# Patient Record
Sex: MALE | Race: WHITE | NOT HISPANIC OR LATINO | URBAN - METROPOLITAN AREA
[De-identification: names, ages, dates, MRNs, and addresses within clinical notes are randomized per-mention and may not be internally consistent; named-entity substitution may affect disease eponyms.]

---

## 2019-09-13 ENCOUNTER — OUTPATIENT (OUTPATIENT)
Dept: OUTPATIENT SERVICES | Age: 29
LOS: 1 days | Discharge: HOME | End: 2019-09-13

## 2019-09-16 ENCOUNTER — APPOINTMENT (OUTPATIENT)
Dept: PEDIATRIC CARDIOLOGY | Facility: CLINIC | Age: 29
End: 2019-09-16
Payer: MEDICARE

## 2019-09-16 VITALS
HEART RATE: 74 BPM | WEIGHT: 145.51 LBS | DIASTOLIC BLOOD PRESSURE: 70 MMHG | HEIGHT: 65.35 IN | SYSTOLIC BLOOD PRESSURE: 119 MMHG | OXYGEN SATURATION: 96 % | BODY MASS INDEX: 23.95 KG/M2 | RESPIRATION RATE: 16 BRPM

## 2019-09-16 DIAGNOSIS — Z00.00 ENCOUNTER FOR GENERAL ADULT MEDICAL EXAMINATION W/OUT ABNORMAL FINDINGS: ICD-10-CM

## 2019-09-16 PROCEDURE — 93325 DOPPLER ECHO COLOR FLOW MAPG: CPT

## 2019-09-16 PROCEDURE — 93000 ELECTROCARDIOGRAM COMPLETE: CPT

## 2019-09-16 PROCEDURE — 93303 ECHO TRANSTHORACIC: CPT

## 2019-09-16 PROCEDURE — 99205 OFFICE O/P NEW HI 60 MIN: CPT | Mod: 25

## 2019-09-16 PROCEDURE — 99354: CPT

## 2019-09-16 PROCEDURE — 93320 DOPPLER ECHO COMPLETE: CPT

## 2019-09-18 NOTE — REASON FOR VISIT
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

## 2019-09-18 NOTE — CONSULT LETTER
[Today's Date] : [unfilled] [Name] : Name: [unfilled] [] : : ~~ [Today's Date:] : [unfilled] [Dear  ___:] : Dear Dr. [unfilled]: [Consult] : I had the pleasure of evaluating your patient, [unfilled]. My full evaluation follows. [Consult - Single Provider] : Thank you very much for allowing me to participate in the care of this patient. If you have any questions, please do not hesitate to contact me. [Sincerely,] : Sincerely, [FreeTextEntry4] : Dr Aguilar [FreeTextEntry5] : [facility] [FreeTextEntry7] : [place]  [id number] [de-identified] : Janelle Martinez, MSN, CPNP-AC, PC\par Pediatric Cardiology, Adult Congenital Cardiology\par [facility]\par  [FreeTextEntry6] : [address]

## 2019-09-18 NOTE — REVIEW OF SYSTEMS
[Feeling Poorly] : not feeling poorly (malaise) [Fever] : no fever [Wgt Loss (___ Lbs)] : no recent weight loss [Pallor] : not pale [Eye Discharge] : no eye discharge [Redness] : no redness [Change in Vision] : no change in vision [Nasal Stuffiness] : no nasal congestion [Sore Throat] : no sore throat [Earache] : no earache [Loss Of Hearing] : no hearing loss [Cyanosis] : no cyanosis [Edema] : no edema [Diaphoresis] : not diaphoretic [Chest Pain] : no chest pain or discomfort [Exercise Intolerance] : no persistence of exercise intolerance [Palpitations] : no palpitations [Orthopnea] : no orthopnea [Fast HR] : no tachycardia [Tachypnea] : not tachypneic [Wheezing] : no wheezing [Cough] : no cough [Shortness Of Breath] : not expressed as feeling short of breath [Vomiting] : no vomiting [Diarrhea] : no diarrhea [Abdominal Pain] : no abdominal pain [Decrease In Appetite] : appetite not decreased [Seizure] : no seizures [Fainting (Syncope)] : no fainting [Headache] : no headache [Limping] : no limping [Dizziness] : no dizziness [Joint Pains] : no arthralgias [Joint Swelling] : no joint swelling [Rash] : no rash [Easy Bruising] : no tendency for easy bruising [Swollen Glands] : no lymphadenopathy [Wound problems] : no wound problems [Easy Bleeding] : no ~M tendency for easy bleeding [Nosebleeds] : no epistaxis [Hyperactive] : no hyperactive behavior [Sleep Disturbances] : ~T no sleep disturbances [Depression] : no depression [Anxiety] : no anxiety [Failure To Thrive] : no failure to thrive [Short Stature] : short stature was not noted [Jitteriness] : no jitteriness [Heat/Cold Intolerance] : no temperature intolerance [Dec Urine Output] : no oliguria [FreeTextEntry1] : This evaluation is confounded as the patient was not fully forthcoming in terms of any symptoms other than what resulted in his hospitalization.

## 2019-09-18 NOTE — HISTORY OF PRESENT ILLNESS
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 He has a history of non-adherence to medical advice and his only visit with us was his transition visit in May 2016. We do not have any of his records but per his mother’s recollection his surgical/cath procedures are outlined below: (dates are approximate)\par \par 1.	[place] Operation, 1990, Dr. Bennett – [facility]\par 2.	Bidirectional Justice Operation, 1991, Dr. Bennett – [facility]\par 3.	Extracardiac Fontan, 1997, Dr. Bennett – [facility]\par 4.	Cardiac Catheterization, 1999 – device closure of his fenestration\par \par Per review of his hospital records from [place] and discussion with him, he presented with a primary complaint of lower extremity edema; mainly legs and abdomen. His saturations were 99% on room air, /53 and HR 76 with no documentation of any rhythm issues while inpatient for 48 hours. While there he was treated with IV Lasix and improved dramatically. He was started on Digoxin, Lisinopril, Lasix and aspirin. Additionally he had a microcytic anemia and was started on oral iron.  He states he has been compliant with medication therapy; swelling has resolved and he feels better. He saw a hepatologist last week in [place] but we do not have that consult available. He is here today though angry that his mother made him come to the visit and initially refusing an echocardiogram.\par \par He does not work, mainly is at home or out with friends. He denies alcohol, smokes marijuana daily and smokes ½ pack of cigarettes daily.  On weekends, he says he "parties" including alcohol though he denies becoming inebriated. He does not go to the dentist or get an annual flu shot. He verbalizes that he does not trust doctors, that they never tell the truth.\par \par His cardiovascular review of systems is unremarkable, specifically he denies chest pain, palpitations, shortness of breath, dizziness, peripheral edema or syncope.\par \par Below is a review of his recent imaging/labs:\par \par Cardiac MRI (8/16/2019) (Interpreted by Dr. Bob)\par HLHS\par Aortic atresia\par Hypoplastic ascending aorta\par -Left aortic arch\par -Patent Justice shunt\par -Branch PA’s look patent, left smaller than right\par -patent Renea anastomosis\par -Fenestration device closure seen\par -extracardiac conduit appears patent\par -Pulmonary veins visualized\par \par CT Angio w/wo contrast (8/14/2019) (interpreted by Dr. Bob)\par -Coronary anatomy normal\par -Ao Root (@ sinus of Valsalva) 4.5 x 4.6 x 4.3\par -Ascending Ao 1.8 cm – narrowest point\par -Proximal Descending Ao 2.9 cm\par -LPA 11mm\par -RPA 17mm\par -Fontan Conduit 1.7 x 1.4 \par -no major collaterals\par -no coarctation\par -evidence of liver nodule/thrombus? In liver/near IVC (non obstructive)\par \par US – Lower Extremity Veins (8/14/2019)\par -patent femoral and popliteal veins\par -no DVT\par \par US- Aorta, IVC, bilateral iliac veins (8/14/2019)\par -no thrombus\par \par CXR (8/14/2019)\par -left aortic arch\par -septal occlusion device noted\par -no pneumothorax or pleural effusion\par -hyperinflated lungs\par \par Labs: (98/26/2019)\par \par            10\par 5.2 >-----< 225\par           33.2\par MCV 70\par MCH 21.2\par MCHC 30\par RDW 22\par \par Ferritin 21.3\par Iron 31 ()\par UIBC 528 (155-300)\par TIBC 559 (250-450)\par Transferrin Sat 6% (20-55%)   \par \par Free T4 1.4\par TSH 8.12\par Normal Urinalysis  \par

## 2019-09-18 NOTE — PHYSICAL EXAM
[General Appearance - Alert] : alert [General Appearance - Well Nourished] : well nourished [General Appearance - In No Acute Distress] : in no acute distress [General Appearance - Well-Appearing] : well appearing [General Appearance - Well Developed] : well developed [Appearance Of Head] : the head was normocephalic [Facies] : there were no dysmorphic facial features [Sclera] : the conjunctiva were normal [Outer Ear] : the ears and nose were normal in appearance [Examination Of The Oral Cavity] : mucous membranes were moist and pink [Auscultation Breath Sounds / Voice Sounds] : breath sounds clear to auscultation bilaterally [Respiration, Rhythm And Depth] : normal respiratory rhythm and effort [No Cough] : no cough [Stridor] : no stridor was observed [Chest Surgical / Traumatic Scar] : chest incision well healed [No Sternal Instability] : no sternal instability [No Tenderness] : there was no tenderness on palpation [Sternotomy] : sternotomy [Deformity] : a chest deformity was noted [Clean] : clean [Healing Well] : healing well [Dry] : dry [Palpable Crepitus] : no palpable crepitus [Well-Healed] : well-healed [Unremarkable] : unremarkable [Apical Impulse] : quiet precordium with normal apical impulse [No Murmur] : no murmurs  [Heart Rate And Rhythm] : normal heart rate and rhythm [Heart Sounds Gallop] : no gallops [Heart Sounds Pericardial Friction Rub] : no pericardial rub [Heart Sounds Click] : no clicks [Capillary Refill Test] : normal capillary refill [Normal] : abnormal  [Normal S1] : normal  [Bowel Sounds] : normal bowel sounds [Abdomen Soft] : soft [Abdomen Tenderness] : non-tender [Liver Tender To Palpation] : not tender [___cm] : with a span of [unfilled] cm [Palpable___cm below the RCM] : palpable [unfilled] cm below the right costal margin [Spleen Tender] : not tender [Musculoskeletal Exam: Normal Movement Of All Extremities] : normal movements of all extremities [Nail Clubbing] : no clubbing  or cyanosis of the fingernails [Musculoskeletal - Swelling] : no joint swelling seen [Musculoskeletal - Tenderness] : no joint tenderness was elicited [FreeTextEntry1] : There are extensive varicosites in the legs bilaterally [Motor Tone] : muscle strength and tone were normal [Cervical Lymph Nodes Enlarged Anterior] : The anterior cervical nodes were normal [Cervical Lymph Nodes Enlarged Posterior] : The posterior cervical nodes were normal [] : no rash [Skin Turgor] : normal turgor [Skin Lesions] : no lesions [Skin Color & Pigmentation] : normal skin color and pigmentation [Agitated] : agitated [Demonstrated Behavior - Infant Nonreactive To Parents] : interactive [Labile] : labile

## 2019-09-18 NOTE — DISCUSSION/SUMMARY
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 He now returns for his first evaluation in 3 years following hospitalization for lower extremity edema and clinical signs of Fontan failure.\par \par Prior to discussing Arie's clinical status we took a significant amount of time (40 minutes) addressing what Arie's expectations are of us, his healthcare team and his thoughts on his chronic care. He has a significant distrust of physicians, feeling that doctors "lie to him", give him the wrong medicine and just order tests to make money. He does appear to have a will to live in that he describes a future in which he sees himself enjoying with his friends. We were very kenneth about the fact that we do not know what the future holds but that there is a possibility that he may one day require a heart transplant. He is not willing to entertain that possibility at this point and time.\par \par We spent a great deal of time explaining his anatomy to him and the current issues that likely brought him to the emergency room.  In particular, we emphasized that his condition has obviously deteriorated somewhat since we last saw him in 2016.  Specifically, I walked him thought the current condition that brought him to the hospital - namely, congestive heart failure.  I probed his understanding of this and explained that this is not a particularly unusual occurrence for his condition.  We also discussed how we would approach the treatment of his heart failure going forward and the need for us to collect more data on him before we could answer questions about the exact treatments available to him.\par \par I was open with him that I understood that there is a significant level of mistrust between him and his physicians and asked him to allow us to iteratively look at his current condition and how he progresses (or not) with further treatment.  Given that he is aware of the potential for heart transplant, we did give him an understanding of how that process ensues following the transplant and potential episodes of rejection.  I won't bring up this possibility with him again unless he specifically asks me to discuss it as it is likely to result in more distrust of us.\par \par We will be speaking with him once we get some of the information from [place] and the labs we ordered today.  I offered to meet with him in our offices with or without his mom to discuss the results of our evaluation.\par  [Participate only in Mild PE activities] : [unfilled] may participate ONLY IN MILD physical education activities such as [place] games, golf, and badminton. [Needs SBE Prophylaxis] : [unfilled] does not need bacterial endocarditis prophylaxis

## 2020-01-01 ENCOUNTER — OUTPATIENT (OUTPATIENT)
Dept: OUTPATIENT SERVICES | Facility: HOSPITAL | Age: 30
LOS: 1 days | End: 2020-01-01

## 2020-01-13 ENCOUNTER — APPOINTMENT (OUTPATIENT)
Dept: PEDIATRIC CARDIOLOGY | Facility: CLINIC | Age: 30
End: 2020-01-13
Payer: MEDICARE

## 2020-01-13 ENCOUNTER — APPOINTMENT (OUTPATIENT)
Dept: PEDIATRIC CARDIOLOGY | Facility: CLINIC | Age: 30
End: 2020-01-13

## 2020-01-13 VITALS
BODY MASS INDEX: 26.13 KG/M2 | DIASTOLIC BLOOD PRESSURE: 75 MMHG | OXYGEN SATURATION: 93 % | HEIGHT: 65.35 IN | WEIGHT: 158.73 LBS | SYSTOLIC BLOOD PRESSURE: 118 MMHG | HEART RATE: 70 BPM | RESPIRATION RATE: 16 BRPM

## 2020-01-13 PROCEDURE — 93320 DOPPLER ECHO COMPLETE: CPT

## 2020-01-13 PROCEDURE — 93325 DOPPLER ECHO COLOR FLOW MAPG: CPT

## 2020-01-13 PROCEDURE — 93303 ECHO TRANSTHORACIC: CPT

## 2020-01-13 PROCEDURE — 93000 ELECTROCARDIOGRAM COMPLETE: CPT

## 2020-01-13 PROCEDURE — 99215 OFFICE O/P EST HI 40 MIN: CPT | Mod: 25

## 2020-01-13 RX ORDER — IRON/IRON ASP GLY/FA/MV-MIN 38 125-25-1MG
TABLET ORAL
Refills: 0 | Status: ACTIVE | COMMUNITY

## 2020-01-21 NOTE — REVIEW OF SYSTEMS
[Anxiety] : anxiety [Feeling Poorly] : not feeling poorly (malaise) [Fever] : no fever [Wgt Loss (___ Lbs)] : no recent weight loss [Pallor] : not pale [Loss Of Hearing] : no hearing loss [Cyanosis] : no cyanosis [Diaphoresis] : not diaphoretic [Edema] : no edema [Chest Pain] : no chest pain or discomfort [Exercise Intolerance] : no persistence of exercise intolerance [Palpitations] : no palpitations [Orthopnea] : no orthopnea [Fast HR] : no tachycardia [Tachypnea] : not tachypneic [Cough] : no cough [Shortness Of Breath] : not expressed as feeling short of breath [Diarrhea] : no diarrhea [Abdominal Pain] : no abdominal pain [Fainting (Syncope)] : no fainting [Headache] : no headache [Limping] : no limping [Dizziness] : no dizziness [Joint Pains] : no arthralgias [Joint Swelling] : no joint swelling [Rash] : no rash [Wound problems] : no wound problems [Easy Bruising] : no tendency for easy bruising [Easy Bleeding] : no ~M tendency for easy bleeding [Nosebleeds] : no epistaxis [Sleep Disturbances] : ~T no sleep disturbances [Depression] : no depression [Jitteriness] : no jitteriness [Heat/Cold Intolerance] : no temperature intolerance

## 2020-01-21 NOTE — CONSULT LETTER
[Today's Date] : [unfilled] [Name] : Name: [unfilled] [Today's Date:] : [unfilled] [] : : ~~ [Consult] : I had the pleasure of evaluating your patient, [unfilled]. My full evaluation follows. [Dear  ___:] : Dear Dr. [unfilled]: [Sincerely,] : Sincerely, [Consult - Multiple Provider] : Thank you very much for allowing us to participate in the care of this patient. If you have any questions, please do not hesitate to contact us. [FreeTextEntry6] : [address] [FreeTextEntry5] : [facility] [FreeTextEntry4] : Dr Aguilar [de-identified] : Janelle Martinez, MSN, CPNP-AC, PC\par Pediatric Cardiology, Adult Congenital Cardiology\par [facility]\par \par Demetrio Hernandez MD, JUNIE\par Medical Director, Adult Congenital Heart Program\par Professor of Pediatrics\par [employer] [facility] [employer] [facility]\par \par  [FreeTextEntry7] : [place]  [id number]

## 2020-01-21 NOTE — HISTORY OF PRESENT ILLNESS
[FreeTextEntry1] : We had the pleasure of seeing Arie for follow up today in the Adult Congenital Heart Program at [facility]. \par \par Arie is a 29 year old male with HLHS, previously followed by Dr. Huffman with a history of non adherence. He had one transition visit with us in 2016. We then saw him again in September 2019 following hospitalization at [place] for lower extremity edema and shortness of breath. He was treated with IV diuretics and started on Digoxin, Lasix, Lisinopril , Aspirin and iron for microcytic anemia. His symptoms have resolved and he has remained adherent with medication therapy since his hospitalization. He was feeling fatigued this past November and went to his PMD who did a full comprehensive lab panel on him which was completely normal. He had changed his iron supplements for a brief period of time and thinks that maybe that was the cause of his fatigue. He has refrained from alcohol but continues to smoke 1/2 pack of cigarettes and marijuana daily. He does exercise but states he would like to resume activity. He does not go to the dentist and does not get a flu shot. \par \par His cardiovascular review of systems is unremarkable. Specifically, he has no complaints of chest pain, palpitations, shortness of breath, peripheral edema, dizziness or syncope.\par \par We have attempted to obtain his operative reports and medical records and have been unsuccessful. Per his mother's recollection his surgical/cath history is summarized below:\par \par 1.	[place] Operation, 1990, Dr. Bennett – [facility]\par 2.	Bidirectional Justice Operation, 1991, Dr. Bennett – [facility]\par 3.	Extracardiac Fontan, 1997, Dr. Bennett – [facility]\par 4.	Cardiac Catheterization, 1999 – device closure of his fenestration

## 2020-01-21 NOTE — REASON FOR VISIT
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

## 2020-01-21 NOTE — PHYSICAL EXAM
[Apical Impulse] : quiet precordium with normal apical impulse [Heart Rate And Rhythm] : normal heart rate and rhythm [Normal S1] : normal  [S2 Single] : was single [Systolic] : systolic [IV] : a grade 4/6   [LUSB] : LUSB [Palpable___cm below the RCM] : palpable [unfilled] cm below the right costal margin [General Appearance - Alert] : alert [Facies] : the head and face were normal in appearance [Sclera] : the sclera were normal [Examination Of The Oral Cavity] : mucous membranes were moist and pink [Auscultation Breath Sounds / Voice Sounds] : breath sounds clear to auscultation bilaterally [Nail Clubbing] : no clubbing  or cyanosis of the fingers [Musculoskeletal Exam: Normal Movement Of All Extremities] : normal movements of all extremities [Musculoskeletal - Swelling] : no joint swelling or joint tenderness [Abnormal Walk] : normal gait [Skin Turgor] : normal turgor [Demonstrated Behavior - Infant Nonreactive To Parents] : interactive [Normal] : abnormal  [Liver Tender To Palpation] : not tender [FreeTextEntry1] : mild lower extremity varicosities

## 2020-01-21 NOTE — DISCUSSION/SUMMARY
[Needs SBE Prophylaxis] : [unfilled]  needs bacterial endocarditis prophylaxis. SBE prophylaxis is indicated for dental and invasive ENT procedures. (Circulation. 2007; 116: 1665-2392) [FreeTextEntry1] : In summary, Arie is a 29 year old male with HLHS who underwent staged palliation with an extracardiac Fontan and device closure of his fenestration a s a child. He is clinically and symptomatically significantly improved from our first visit with him following his hospitalization at [place] for lower extremity edema and shortness of breath. He has been compliant with his medications. His thyroid studies and his microcytic anemia have resolved.\par \par We discussed with him that we are pleased how he is doing currently. We reviewed the importance of meticulous dental hygiene and the need for regular dental cleanings even though he does not go to the dentist. We explained the importance of an annual flu shot. He has agreed to wear a Holter as part of routine screening for any arrhythmia and we will notify his mother with the results per his request. We have requested that he return for followup in 8 months.

## 2020-01-25 ENCOUNTER — EMERGENCY (EMERGENCY)
Facility: HOSPITAL | Age: 30
LOS: 1 days | Discharge: LEFT BEFORE DISCHARGE | End: 2020-01-25
Attending: EMERGENCY MEDICINE
Payer: MEDICARE

## 2020-01-25 VITALS
WEIGHT: 154.98 LBS | OXYGEN SATURATION: 95 % | RESPIRATION RATE: 18 BRPM | HEART RATE: 92 BPM | SYSTOLIC BLOOD PRESSURE: 112 MMHG | HEIGHT: 66 IN | TEMPERATURE: 98 F | DIASTOLIC BLOOD PRESSURE: 88 MMHG

## 2020-01-25 VITALS
RESPIRATION RATE: 18 BRPM | DIASTOLIC BLOOD PRESSURE: 66 MMHG | SYSTOLIC BLOOD PRESSURE: 99 MMHG | TEMPERATURE: 98 F | OXYGEN SATURATION: 96 % | HEART RATE: 87 BPM

## 2020-01-25 DIAGNOSIS — Z98.890 OTHER SPECIFIED POSTPROCEDURAL STATES: Chronic | ICD-10-CM

## 2020-01-25 LAB
ALBUMIN SERPL ELPH-MCNC: 2.9 G/DL — LOW (ref 3.3–5)
ALP SERPL-CCNC: 95 U/L — SIGNIFICANT CHANGE UP (ref 40–120)
ALT FLD-CCNC: 14 U/L — SIGNIFICANT CHANGE UP (ref 10–45)
ANION GAP SERPL CALC-SCNC: 15 MMOL/L — SIGNIFICANT CHANGE UP (ref 5–17)
APTT BLD: 29.2 SEC — SIGNIFICANT CHANGE UP (ref 27.5–36.3)
AST SERPL-CCNC: 15 U/L — SIGNIFICANT CHANGE UP (ref 10–40)
BASE EXCESS BLDV CALC-SCNC: 0.8 MMOL/L — SIGNIFICANT CHANGE UP (ref -2–2)
BASOPHILS # BLD AUTO: 0.09 K/UL — SIGNIFICANT CHANGE UP (ref 0–0.2)
BASOPHILS NFR BLD AUTO: 0.8 % — SIGNIFICANT CHANGE UP (ref 0–2)
BILIRUB SERPL-MCNC: 0.3 MG/DL — SIGNIFICANT CHANGE UP (ref 0.2–1.2)
BLD GP AB SCN SERPL QL: NEGATIVE — SIGNIFICANT CHANGE UP
BUN SERPL-MCNC: 17 MG/DL — SIGNIFICANT CHANGE UP (ref 7–23)
CA-I SERPL-SCNC: 1.11 MMOL/L — LOW (ref 1.12–1.3)
CALCIUM SERPL-MCNC: 7.9 MG/DL — LOW (ref 8.4–10.5)
CHLORIDE BLDV-SCNC: 100 MMOL/L — SIGNIFICANT CHANGE UP (ref 96–108)
CHLORIDE SERPL-SCNC: 98 MMOL/L — SIGNIFICANT CHANGE UP (ref 96–108)
CO2 BLDV-SCNC: 26 MMOL/L — SIGNIFICANT CHANGE UP (ref 22–30)
CO2 SERPL-SCNC: 21 MMOL/L — LOW (ref 22–31)
CREAT SERPL-MCNC: 0.97 MG/DL — SIGNIFICANT CHANGE UP (ref 0.5–1.3)
DIGOXIN SERPL-MCNC: <0.4 NG/ML — LOW (ref 0.8–2)
EOSINOPHIL # BLD AUTO: 0.07 K/UL — SIGNIFICANT CHANGE UP (ref 0–0.5)
EOSINOPHIL NFR BLD AUTO: 0.6 % — SIGNIFICANT CHANGE UP (ref 0–6)
GAS PNL BLDV: 130 MMOL/L — LOW (ref 135–145)
GAS PNL BLDV: SIGNIFICANT CHANGE UP
GAS PNL BLDV: SIGNIFICANT CHANGE UP
GLUCOSE BLDV-MCNC: 123 MG/DL — HIGH (ref 70–99)
GLUCOSE SERPL-MCNC: 133 MG/DL — HIGH (ref 70–99)
HCO3 BLDV-SCNC: 25 MMOL/L — SIGNIFICANT CHANGE UP (ref 21–29)
HCT VFR BLD CALC: 25.3 % — LOW (ref 39–50)
HCT VFR BLDA CALC: 23 % — LOW (ref 39–50)
HGB BLD CALC-MCNC: 7.5 G/DL — LOW (ref 13–17)
HGB BLD-MCNC: 7.2 G/DL — LOW (ref 13–17)
IMM GRANULOCYTES NFR BLD AUTO: 0.9 % — SIGNIFICANT CHANGE UP (ref 0–1.5)
INR BLD: 1.16 RATIO — SIGNIFICANT CHANGE UP (ref 0.88–1.16)
LACTATE BLDV-MCNC: 1.6 MMOL/L — SIGNIFICANT CHANGE UP (ref 0.7–2)
LYMPHOCYTES # BLD AUTO: 0.56 K/UL — LOW (ref 1–3.3)
LYMPHOCYTES # BLD AUTO: 5.1 % — LOW (ref 13–44)
MCHC RBC-ENTMCNC: 25 PG — LOW (ref 27–34)
MCHC RBC-ENTMCNC: 28.5 GM/DL — LOW (ref 32–36)
MCV RBC AUTO: 87.8 FL — SIGNIFICANT CHANGE UP (ref 80–100)
MONOCYTES # BLD AUTO: 0.85 K/UL — SIGNIFICANT CHANGE UP (ref 0–0.9)
MONOCYTES NFR BLD AUTO: 7.7 % — SIGNIFICANT CHANGE UP (ref 2–14)
NEUTROPHILS # BLD AUTO: 9.31 K/UL — HIGH (ref 1.8–7.4)
NEUTROPHILS NFR BLD AUTO: 84.9 % — HIGH (ref 43–77)
NRBC # BLD: 0 /100 WBCS — SIGNIFICANT CHANGE UP (ref 0–0)
OTHER CELLS CSF MANUAL: 2 ML/DL — LOW (ref 18–22)
PCO2 BLDV: 42 MMHG — SIGNIFICANT CHANGE UP (ref 35–50)
PH BLDV: 7.39 — SIGNIFICANT CHANGE UP (ref 7.35–7.45)
PLATELET # BLD AUTO: 263 K/UL — SIGNIFICANT CHANGE UP (ref 150–400)
PO2 BLDV: 20 MMHG — LOW (ref 25–45)
POTASSIUM BLDV-SCNC: 3.2 MMOL/L — LOW (ref 3.5–5.3)
POTASSIUM SERPL-MCNC: 3.7 MMOL/L — SIGNIFICANT CHANGE UP (ref 3.5–5.3)
POTASSIUM SERPL-SCNC: 3.7 MMOL/L — SIGNIFICANT CHANGE UP (ref 3.5–5.3)
PROT SERPL-MCNC: 5.1 G/DL — LOW (ref 6–8.3)
PROTHROM AB SERPL-ACNC: 13.3 SEC — HIGH (ref 10–12.9)
RBC # BLD: 2.88 M/UL — LOW (ref 4.2–5.8)
RBC # FLD: 15.4 % — HIGH (ref 10.3–14.5)
RH IG SCN BLD-IMP: POSITIVE — SIGNIFICANT CHANGE UP
SAO2 % BLDV: 19 % — LOW (ref 67–88)
SODIUM SERPL-SCNC: 134 MMOL/L — LOW (ref 135–145)
TROPONIN T, HIGH SENSITIVITY RESULT: 15 NG/L — SIGNIFICANT CHANGE UP (ref 0–51)
TROPONIN T, HIGH SENSITIVITY RESULT: 15 NG/L — SIGNIFICANT CHANGE UP (ref 0–51)
WBC # BLD: 10.98 K/UL — HIGH (ref 3.8–10.5)
WBC # FLD AUTO: 10.98 K/UL — HIGH (ref 3.8–10.5)

## 2020-01-25 PROCEDURE — 84132 ASSAY OF SERUM POTASSIUM: CPT

## 2020-01-25 PROCEDURE — 80053 COMPREHEN METABOLIC PANEL: CPT

## 2020-01-25 PROCEDURE — 85027 COMPLETE CBC AUTOMATED: CPT

## 2020-01-25 PROCEDURE — 99285 EMERGENCY DEPT VISIT HI MDM: CPT | Mod: GC

## 2020-01-25 PROCEDURE — 85730 THROMBOPLASTIN TIME PARTIAL: CPT

## 2020-01-25 PROCEDURE — 82330 ASSAY OF CALCIUM: CPT

## 2020-01-25 PROCEDURE — 80162 ASSAY OF DIGOXIN TOTAL: CPT

## 2020-01-25 PROCEDURE — 86850 RBC ANTIBODY SCREEN: CPT

## 2020-01-25 PROCEDURE — 71046 X-RAY EXAM CHEST 2 VIEWS: CPT

## 2020-01-25 PROCEDURE — 86901 BLOOD TYPING SEROLOGIC RH(D): CPT

## 2020-01-25 PROCEDURE — 85014 HEMATOCRIT: CPT

## 2020-01-25 PROCEDURE — 36430 TRANSFUSION BLD/BLD COMPNT: CPT

## 2020-01-25 PROCEDURE — 71046 X-RAY EXAM CHEST 2 VIEWS: CPT | Mod: 26

## 2020-01-25 PROCEDURE — 84484 ASSAY OF TROPONIN QUANT: CPT

## 2020-01-25 PROCEDURE — P9016: CPT

## 2020-01-25 PROCEDURE — 93005 ELECTROCARDIOGRAM TRACING: CPT

## 2020-01-25 PROCEDURE — 82947 ASSAY GLUCOSE BLOOD QUANT: CPT

## 2020-01-25 PROCEDURE — 86923 COMPATIBILITY TEST ELECTRIC: CPT

## 2020-01-25 PROCEDURE — 86900 BLOOD TYPING SEROLOGIC ABO: CPT

## 2020-01-25 PROCEDURE — 93010 ELECTROCARDIOGRAM REPORT: CPT

## 2020-01-25 PROCEDURE — 82435 ASSAY OF BLOOD CHLORIDE: CPT

## 2020-01-25 PROCEDURE — 83605 ASSAY OF LACTIC ACID: CPT

## 2020-01-25 PROCEDURE — 85610 PROTHROMBIN TIME: CPT

## 2020-01-25 PROCEDURE — 82803 BLOOD GASES ANY COMBINATION: CPT

## 2020-01-25 PROCEDURE — 84295 ASSAY OF SERUM SODIUM: CPT

## 2020-01-25 PROCEDURE — 99285 EMERGENCY DEPT VISIT HI MDM: CPT | Mod: 25

## 2020-01-25 NOTE — ED PROVIDER NOTE - PROGRESS NOTE DETAILS
Ervin, PGY3: Called patient's cardiologist, Dr. Demetrio Hernandez, and received voicemail. Left message requesting callback. Ervin, PGY3: Called patient's cardiology NP, Janelle Martinez, and received voicemail. Left message requesting callback. Alejandro RAWLS MD PGY2: Patient feels improved. We want admission for him due to anemia requiring transfusion on unknown etiology, subtheraputic dig levels, and initial presentation of dyspnea and hypotension, and patient appearing ghostly pale. All of this explained to parenst and patient, and they would likely to leave AMA as they feel he has felt this way in the past and would prefer to follow up with cardiologist on Monday. Risks of leaving, including death and decompensation, explained and understood. WIll return for any concerning devlopment.

## 2020-01-25 NOTE — CHART NOTE - NSCHARTNOTEFT_GEN_A_CORE
Patient wants to leave AMA. Reason for leaving is that patient states that he has felt this way int he past and would prefer to follow up with his cardiologist on Monday. The patient is clinically sober, free from distracting injury, appears to have intact insight and judgement and reason and in my opinion has the capacity to make decisions.     Signs and symptoms of symptomatic anemia; pale appearing and chronically ill appearing.   They have verbalized an understanding of my concerns.  Extent and limitation of exam include not having repeat H/H, monitoring, occult blood testing and cardiology evaluation.  Labs/Imaging/Concerns; labs show anemia 7/2/25.3, no baseline, symptomatic s/p 1U transfusion, imaging unremarkable; concern for unknown etiology for anemia and previous cardiac history.  Current treatment plan was to admit for monitoring H/H, discovering the etiology of his anemia and evaluation by his cardiologist in house.  Need for this was explained to patient in lay terms and he expressed his understanding.  Risks of forgoing treatment explained to patient in lay terms including worsening anemia, cardiac ischemia secondary to anemia, arrythmias, possible morbidity (paralysis/inability to walk etc) and death.   Alternatives to suggested treatment included monitoring in the ED, observation and repeat H/H here and further monitoring here.  Patient refused to wait in the ED, refused admission, refused repeat labs, refused occult blood.   Patient was given time for Questions and states he will follow up with his cardiologist on Monday. Attempted to reach patient's cardiologist and PMD via phone but went to voicemail.    Instructed patient to call 911 immediately if symptoms worsen or persist. Also instructed to return to ED as soon as possible. Patient has decisional capacity. Understands all of the above.     Patient requested to follow up with PMD as soon as possible.

## 2020-01-25 NOTE — ED ADULT NURSE NOTE - NSIMPLEMENTINTERV_GEN_ALL_ED
Implemented All Universal Safety Interventions:  [place] to call system. Call bell, personal items and telephone within reach. Instruct patient to call for assistance. Room bathroom lighting operational. Non-slip footwear when patient is off stretcher. Physically safe environment: no spills, clutter or unnecessary equipment. Stretcher in lowest position, wheels locked, appropriate side rails in place.

## 2020-01-25 NOTE — ED ADULT NURSE REASSESSMENT NOTE - NS ED NURSE REASSESS COMMENT FT1
Report received from Shola TURPIN.  Pt A&Ox3 calm and cooperative, Pt pale with cap refil at 4 sec, VSS.  Pt states he feels much better with the blood.  Denies chest pain, sob, ha, n/v/d, abdominal pain, f/c, urinary symptoms, hematuria.

## 2020-01-25 NOTE — ED ADULT NURSE NOTE - OBJECTIVE STATEMENT
The pt. is a 28 y/o M PMH of anemia, HLHS presenting to the ED with anemia, weakness and chills x 2 weeks. Upon assessment, pt. is AO x 4, clear breath sounds, normal heart sounds, present bowel sounds, abd soft and nontender, equal strength bilaterally. Pt. denies SOB, fever, n/v. Pt. resting comfortably, mother at the bedside, will continue to monitor. The pt. is a 30 y/o M PMH of anemia, HLHS presenting to the ED with anemia, weakness and chills x 2 weeks. Upon assessment, pt. is AO x 4, clear breath sounds, normal heart sounds, present bowel sounds, abd soft and nontender, equal strength bilaterally. Pt. denies SOB, fever, n/v. Pt. resting comfortably, mother at the bedside, will continue to monitor. Pt is profoundly pale. Denies visible blood loss (no epistaxis stool/hematuria)

## 2020-01-25 NOTE — ED ADULT NURSE REASSESSMENT NOTE - NS ED NURSE REASSESS COMMENT FT1
Pt states he wants to leave AMA, Pt signed AMA forms, states he feels much better and will follow up with cardiology on Monday

## 2020-01-25 NOTE — ED PROVIDER NOTE - PATIENT PORTAL LINK FT
You can access the FollowMyHealth Patient Portal offered by [facility] by registering at the following website: [email or web address]. By joining [facility]’s FollowMyHealth portal, you will also be able to view your health information using other applications (apps) compatible with our system.

## 2020-01-25 NOTE — ED PROVIDER NOTE - INTERNATIONAL TRAVEL
HISTORY OF PRESENT ILLNESS  Jayna Nash is a 68 y.o. female. HPI  Follow up on chronic medical problems. Overall feeling very good. Cardiovascular Review:  The patient has hypertension and hyperlipidemia. Last lipid profile in 11/16 was WNL. Tolerating dyazide and Norvasc very well. No chest pain and no SOB. Diet and Lifestyle: generally follows a low fat low cholesterol diet, generally follows a low sodium diet, exercises regularly  Home BP Monitoring: Ranging 120s/80s. Pertinent ROS: taking medications as instructed, no TIA's, no chest pain on exertion, no dyspnea on exertion, no swelling of ankles. Has had some irregular heart beat over the last several weeks. Comes and goes. Glucose intolerance reveiw:  She has IGT. Last a1c level was 5.9% in 12/16. Diabetic ROS - further diabetic ROS: no polyuria or polydipsia, no chest pain, dyspnea or TIA's, no numbness, tingling or pain in extremities, no unusual visual symptoms. Lab review: orders written for new lab studies as appropriate; see orders. Patient Active Problem List   Diagnosis Code    Hypovitaminosis D E55.9    Hyperlipidemia E78.5    Asymptomatic PVCs/APC's I49.3    IGT (impaired glucose tolerance) R73.02    Essential hypertension I10    Encounter for medication monitoring Z51.81       Current Outpatient Prescriptions   Medication Sig Dispense Refill    chlorpheniramine-dm (CORICIDIN HBP COUGH AND COLD) 4-30 mg tab Take  by mouth.  [START ON 2/9/2018] potassium chloride SR (KLOR-CON 10) 10 mEq tablet Take 1 Tab by mouth two (2) times a week. 45 Tab 1    triamterene-hydroCHLOROthiazide (DYAZIDE) 37.5-25 mg per capsule Take 1 Cap by mouth daily. 90 Cap 3    amLODIPine (NORVASC) 5 mg tablet TAKE 1 TABLET BY MOUTH DAILY 90 Tab 3    cholecalciferol, vitamin D3, (VITAMIN D3) 2,000 unit tab Take 2,000 Units by mouth daily.  ferrous sulfate 325 mg (65 mg iron) tablet Take 325 mg by mouth as needed.       aspirin 81 mg tablet Take 1 Tab by mouth daily.  fluticasone (FLONASE) 50 mcg/actuation nasal spray 2 Sprays by Both Nostrils route as needed for Rhinitis. Allergies   Allergen Reactions    Triamterene Swelling and Other (comments)     Diazide is ok. ...just it's generic.     Prinzide [Lisinopril-Hydrochlorothiazide] Shortness of Breath     fatigue       Past Medical History:   Diagnosis Date    Hypertension        Past Surgical History:   Procedure Laterality Date    HX DILATION AND CURETTAGE      OH COLONOSCOPY FLX DX W/COLLJ SPEC WHEN PFRMD      \"long time ago\"       Family History   Problem Relation Age of Onset    Anemia Mother     Heart Failure Mother     Heart Failure Father     Dementia Father      alzheimers    No Known Problems Sister     No Known Problems Brother     No Known Problems Brother     No Known Problems Brother     Heart Attack Sister     No Known Problems Brother     No Known Problems Sister        Social History   Substance Use Topics    Smoking status: Never Smoker    Smokeless tobacco: Never Used    Alcohol use 0.0 oz/week     0 Standard drinks or equivalent per week      Comment: rarely     Lab Results  Component Value Date/Time   WBC 6.2 08/04/2017 11:44 AM   HGB 13.3 08/04/2017 11:44 AM   Hemoglobin (POC) 15.3 12/26/2014 05:11 PM   HCT 41.2 08/04/2017 11:44 AM   Hematocrit (POC) 45 12/26/2014 05:11 PM   PLATELET 183 83/94/5933 11:44 AM   MCV 75 (L) 08/04/2017 11:44 AM     Lab Results  Component Value Date/Time   Cholesterol, total 188 08/04/2017 11:44 AM   HDL Cholesterol 56 08/04/2017 11:44 AM   LDL, calculated 112 (H) 08/04/2017 11:44 AM   Triglyceride 101 08/04/2017 11:44 AM     Lab Results  Component Value Date/Time   TSH 0.876 10/15/2014 09:30 AM   T4, Free 1.14 10/15/2014 09:30 AM      Lab Results   Component Value Date/Time    Sodium 141 08/04/2017 11:44 AM    Potassium 3.7 08/04/2017 11:44 AM    Chloride 94 (L) 08/04/2017 11:44 AM    CO2 29 08/04/2017 11:44 AM Anion gap 4 (L) 12/26/2014 05:04 PM    Glucose 91 08/04/2017 11:44 AM    BUN 14 08/04/2017 11:44 AM    Creatinine 0.95 08/04/2017 11:44 AM    BUN/Creatinine ratio 15 08/04/2017 11:44 AM    GFR est AA 69 08/04/2017 11:44 AM    GFR est non-AA 60 08/04/2017 11:44 AM    Calcium 9.9 08/04/2017 11:44 AM    Bilirubin, total 0.3 08/04/2017 11:44 AM    ALT (SGPT) 15 08/04/2017 11:44 AM    AST (SGOT) 20 08/04/2017 11:44 AM    Alk. phosphatase 62 08/04/2017 11:44 AM    Protein, total 7.6 08/04/2017 11:44 AM    Albumin 4.6 08/04/2017 11:44 AM    A-G Ratio 1.5 08/04/2017 11:44 AM      Lab Results   Component Value Date/Time    Hemoglobin A1c 6.2 (H) 06/26/2013 09:48 AM    Hemoglobin A1c (POC) 6.1 02/06/2018 11:49 AM         Review of Systems   Constitutional: Negative for malaise/fatigue. HENT: Negative for congestion. Eyes: Negative for blurred vision. Respiratory: Negative for cough and shortness of breath. Cardiovascular: Negative for chest pain, palpitations and leg swelling. Gastrointestinal: Negative for abdominal pain, constipation and heartburn. Genitourinary: Negative for dysuria, frequency and urgency. Musculoskeletal: Negative for back pain and joint pain. Neurological: Negative for dizziness, tingling and headaches. Endo/Heme/Allergies: Negative for environmental allergies. Psychiatric/Behavioral: Negative for depression. The patient does not have insomnia. Physical Exam   Constitutional: She appears well-developed and well-nourished. /73 (BP 1 Location: Left arm, BP Patient Position: Sitting)  Pulse 75  Temp 98.1 °F (36.7 °C) (Oral)   Resp 16  Ht 5' 4.25\" (1.632 m)  Wt 212 lb 6.4 oz (96.3 kg)  LMP 08/15/1994  SpO2 98%  BMI 36.18 kg/m2       HENT:   Right Ear: Tympanic membrane and ear canal normal.   Left Ear: Tympanic membrane and ear canal normal.   Nose: No mucosal edema or rhinorrhea.    Mouth/Throat: Oropharynx is clear and moist and mucous membranes are normal. Neck: Normal range of motion. Neck supple. No thyromegaly present. Cardiovascular: Normal rate and regular rhythm. No murmur heard. Pulmonary/Chest: Effort normal and breath sounds normal.   Abdominal: Soft. Bowel sounds are normal. There is no tenderness. Musculoskeletal: Normal range of motion. She exhibits no edema. Lymphadenopathy:     She has no cervical adenopathy. Skin: Skin is warm and dry. Psychiatric: She has a normal mood and affect. Nursing note and vitals reviewed. ASSESSMENT and PLAN  Diagnoses and all orders for this visit:    1. Essential hypertension  Stable     2. Hyperlipidemia, unspecified hyperlipidemia type  -     LIPID PANEL    3. IGT (impaired glucose tolerance)  -     AMB POC HEMOGLOBIN A1C    4. Hypokalemia  -     Refill potassium chloride SR (KLOR-CON 10) 10 mEq tablet; Take 1 Tab by mouth two (2) times a week. 5. Hypovitaminosis D  -     VITAMIN D, 25 HYDROXY    6. Encounter for medication monitoring  -     METABOLIC PANEL, BASIC    7. Encounter for screening mammogram for breast cancer  -     TABBY MAMMO BI SCREENING INCL CAD; Future    8. Screening for osteoporosis  -     DEXA BONE DENSITY STUDY AXIAL; Future    9. Colon cancer screening  -     OCCULT BLOOD, IMMUNOASSAY (FIT)    10. Non Morbid Obesity  Discussed weight loss options, diet and exercise. Also reviewed health issues related to obesity. Initial goal of weight loss 10% of current weight. Counseled on goal for exercise of eventual goal of 30-60 minutes 5-7 times a week as per AHA guidelines. Decrease carbohydrates (white foods, sweet foods, sweet drinks and alcohol), increase green leafy vegetables and protein (lean meats and beans). Avoid fried foods. Increase water intake. Eat 3-5 small meals daily. Increase physical activity.     Follow-up Disposition:  Return in about 6 months (around 8/6/2018) for physical.  reviewed diet, exercise and weight control  cardiovascular risk and specific lipid/LDL goals reviewed  reviewed medications and side effects in detail  specific diabetic recommendations: low cholesterol diet, weight control and daily exercise discussed and glycohemoglobin and other lab monitoring discussed     I have discussed diagnosis listed in this note with pt and/or family. I have discussed treatment plans and options and the risk/benefit analysis of those options, including safe use of medications and possible medication side effects. Through the use of shared decision making we have agreed to the above plan. The patient has received an after-visit summary and questions were answered concerning future plans and follow up. Advise pt of any urgent changes then to proceed to the ER. No

## 2020-01-25 NOTE — ED PROVIDER NOTE - CLINICAL SUMMARY MEDICAL DECISION MAKING FREE TEXT BOX
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 History of anemia of unclear origin. Will obtain labs, including cbc, type and screen.. Given unclear origin of anemia, will obtain fecal occult blood. Given digoxin use, will obtain level, as well as EKG. Will obtain cxr, blood gas, cmp to rule out other causes for weakness. Will provide blood if anemic. Reassess.

## 2020-01-25 NOTE — ED PROVIDER NOTE - CONSTITUTIONAL, MLM
normal... Pale appearing, awake, alert, oriented to person, place, time/situation and in no apparent distress.

## 2020-01-25 NOTE — ED PROVIDER NOTE - OBJECTIVE STATEMENT
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 States feels similar to when he was anemic in the past. Denies sob at rest, leg swelling, chest pain, palpitations, fever, cough, or any other complaints.     PMD: Dr. Aguilar  Card: Dr. Hernandez and Dr. Martinez

## 2020-01-25 NOTE — ED PROVIDER NOTE - ATTENDING CONTRIBUTION TO CARE
29M w/ PMHx as above, presenting with worsening paleness, fatigue, coldness, dizziness, shortness of breath with exertion and decreased appetite x 4-5 days. Patient states that he was anemic in the past. Does not know why he was anemic.    PHYSICAL EXAMINATION:  VITALS REVIEWED.  VS borderline hypotensive 99/66, otherwise normal  GENERALIZED APPEARANCE: Chronically ill appearing male, pale  SKIN:  Warm, dry, no cyanosis  HEAD:  No obvious scalp lesions  EYES:  Conjunctiva pale, no icterus  ENMT:  Mucus membranes moist, no stridor  NECK:  Supple, non-tender  CHEST AND RESPIRATORY:  Clear to auscultation B/L, good air entry B/L, equal chest expansion  HEART AND CARDIOVASCULAR:  Regular rate, +murmur  ABDOMEN AND GI:  Soft, non-tender, non-distended.  No rebound, no guarding  EXTREMITIES:  No deformity, edema, or calf tenderness  NEURO: AAOx3, gross motor and sensory intact, MAEx4, strength 5/5 B/L UE/LE, reflexes 2+ B/L UE/LE  PSYCH: Normal affect     Impression:  Anemia with extensive cardiac history; r/o ischemia, labs, imaging, transfuse, admit for further work up and cardiology evaluation.

## 2020-01-25 NOTE — ED PROVIDER NOTE - NSFOLLOWUPINSTRUCTIONS_ED_ALL_ED_FT
Please return to the ED for any concerns whatsoever. Please follow-up and call your cardiologist ASAP.

## 2020-01-25 NOTE — ED ADULT NURSE REASSESSMENT NOTE - NS ED NURSE REASSESS COMMENT FT1
Hemoglobin 7.2, type and screen resulted O+. Blood transfusion consent in paper chart. 1 Unite PRBCs instantiated at 1720 to infuse over 3 hours. Pt. tolerating well. Pt. hypotensive. MD Mueller aware. Hemoglobin 7.2, type and screen resulted O+. Blood transfusion consent in paper chart. 1 Unit PRBCs initiated at 1720 to infuse over 3 hours. Pt. tolerating well. Pt. hypotensive; still awake and alert. MD Mueller aware.

## 2020-01-28 PROBLEM — Q23.4 HYPOPLASTIC LEFT HEART SYNDROME: Chronic | Status: ACTIVE | Noted: 2020-01-25

## 2020-01-31 ENCOUNTER — APPOINTMENT (OUTPATIENT)
Dept: GASTROENTEROLOGY | Facility: CLINIC | Age: 30
End: 2020-01-31

## 2020-01-31 ENCOUNTER — TRANSCRIPTION ENCOUNTER (OUTPATIENT)
Age: 30
End: 2020-01-31

## 2020-01-31 ENCOUNTER — INPATIENT (INPATIENT)
Facility: HOSPITAL | Age: 30
LOS: 0 days | Discharge: LEFT BEFORE DISCHARGE | End: 2020-02-01
Attending: HOSPITALIST | Admitting: HOSPITALIST
Payer: MEDICARE

## 2020-01-31 VITALS
OXYGEN SATURATION: 100 % | SYSTOLIC BLOOD PRESSURE: 97 MMHG | HEIGHT: 66 IN | RESPIRATION RATE: 24 BRPM | HEART RATE: 112 BPM | TEMPERATURE: 98 F | DIASTOLIC BLOOD PRESSURE: 47 MMHG

## 2020-01-31 DIAGNOSIS — R19.5 OTHER FECAL ABNORMALITIES: ICD-10-CM

## 2020-01-31 DIAGNOSIS — E83.51 HYPOCALCEMIA: ICD-10-CM

## 2020-01-31 DIAGNOSIS — E87.1 HYPO-OSMOLALITY AND HYPONATREMIA: ICD-10-CM

## 2020-01-31 DIAGNOSIS — K76.9 LIVER DISEASE, UNSPECIFIED: ICD-10-CM

## 2020-01-31 DIAGNOSIS — R65.10 SYSTEMIC INFLAMMATORY RESPONSE SYNDROME (SIRS) OF NON-INFECTIOUS ORIGIN WITHOUT ACUTE ORGAN DYSFUNCTION: ICD-10-CM

## 2020-01-31 DIAGNOSIS — D64.9 ANEMIA, UNSPECIFIED: ICD-10-CM

## 2020-01-31 DIAGNOSIS — Z98.890 OTHER SPECIFIED POSTPROCEDURAL STATES: Chronic | ICD-10-CM

## 2020-01-31 DIAGNOSIS — Z02.9 ENCOUNTER FOR ADMINISTRATIVE EXAMINATIONS, UNSPECIFIED: ICD-10-CM

## 2020-01-31 DIAGNOSIS — Z29.9 ENCOUNTER FOR PROPHYLACTIC MEASURES, UNSPECIFIED: ICD-10-CM

## 2020-01-31 DIAGNOSIS — Q23.4 HYPOPLASTIC LEFT HEART SYNDROME: ICD-10-CM

## 2020-01-31 DIAGNOSIS — R06.02 SHORTNESS OF BREATH: ICD-10-CM

## 2020-01-31 LAB
ALBUMIN SERPL ELPH-MCNC: 2.5 G/DL — LOW (ref 3.3–5)
ALP SERPL-CCNC: 106 U/L — SIGNIFICANT CHANGE UP (ref 40–120)
ALT FLD-CCNC: 20 U/L — SIGNIFICANT CHANGE UP (ref 4–41)
ANION GAP SERPL CALC-SCNC: 12 MMO/L — SIGNIFICANT CHANGE UP (ref 7–14)
APPEARANCE UR: CLEAR — SIGNIFICANT CHANGE UP
APTT BLD: 26.4 SEC — LOW (ref 27.5–36.3)
AST SERPL-CCNC: 43 U/L — HIGH (ref 4–40)
B PERT DNA SPEC QL NAA+PROBE: NOT DETECTED — SIGNIFICANT CHANGE UP
BASOPHILS # BLD AUTO: 0.03 K/UL — SIGNIFICANT CHANGE UP (ref 0–0.2)
BASOPHILS NFR BLD AUTO: 0.5 % — SIGNIFICANT CHANGE UP (ref 0–2)
BASOPHILS NFR SPEC: 0 % — SIGNIFICANT CHANGE UP (ref 0–2)
BILIRUB SERPL-MCNC: 0.4 MG/DL — SIGNIFICANT CHANGE UP (ref 0.2–1.2)
BILIRUB UR-MCNC: NEGATIVE — SIGNIFICANT CHANGE UP
BLD GP AB SCN SERPL QL: NEGATIVE — SIGNIFICANT CHANGE UP
BLOOD UR QL VISUAL: NEGATIVE — SIGNIFICANT CHANGE UP
BUN SERPL-MCNC: 22 MG/DL — SIGNIFICANT CHANGE UP (ref 7–23)
C PNEUM DNA SPEC QL NAA+PROBE: NOT DETECTED — SIGNIFICANT CHANGE UP
CALCIUM SERPL-MCNC: 7.7 MG/DL — LOW (ref 8.4–10.5)
CHLORIDE SERPL-SCNC: 98 MMOL/L — SIGNIFICANT CHANGE UP (ref 98–107)
CHOLEST SERPL-MCNC: 94 MG/DL — LOW (ref 120–199)
CO2 SERPL-SCNC: 20 MMOL/L — LOW (ref 22–31)
COLOR SPEC: YELLOW — SIGNIFICANT CHANGE UP
CREAT SERPL-MCNC: 1.05 MG/DL — SIGNIFICANT CHANGE UP (ref 0.5–1.3)
DIGOXIN SERPL-MCNC: 0.6 NG/ML — LOW (ref 0.8–2)
EOSINOPHIL # BLD AUTO: 0 K/UL — SIGNIFICANT CHANGE UP (ref 0–0.5)
EOSINOPHIL NFR BLD AUTO: 0 % — SIGNIFICANT CHANGE UP (ref 0–6)
EOSINOPHIL NFR FLD: 0 % — SIGNIFICANT CHANGE UP (ref 0–6)
FERRITIN SERPL-MCNC: 14.77 NG/ML — LOW (ref 30–400)
FLUAV H1 2009 PAND RNA SPEC QL NAA+PROBE: NOT DETECTED — SIGNIFICANT CHANGE UP
FLUAV H1 RNA SPEC QL NAA+PROBE: NOT DETECTED — SIGNIFICANT CHANGE UP
FLUAV H3 RNA SPEC QL NAA+PROBE: NOT DETECTED — SIGNIFICANT CHANGE UP
FLUAV SUBTYP SPEC NAA+PROBE: NOT DETECTED — SIGNIFICANT CHANGE UP
FLUBV RNA SPEC QL NAA+PROBE: NOT DETECTED — SIGNIFICANT CHANGE UP
GLUCOSE SERPL-MCNC: 98 MG/DL — SIGNIFICANT CHANGE UP (ref 70–99)
GLUCOSE UR-MCNC: NEGATIVE — SIGNIFICANT CHANGE UP
HADV DNA SPEC QL NAA+PROBE: NOT DETECTED — SIGNIFICANT CHANGE UP
HAPTOGLOB SERPL-MCNC: 168 MG/DL — SIGNIFICANT CHANGE UP (ref 34–200)
HCOV PNL SPEC NAA+PROBE: SIGNIFICANT CHANGE UP
HCT VFR BLD CALC: 22.2 % — LOW (ref 39–50)
HCT VFR BLD CALC: 28.8 % — LOW (ref 39–50)
HDLC SERPL-MCNC: 24 MG/DL — LOW (ref 35–55)
HGB BLD-MCNC: 6 G/DL — CRITICAL LOW (ref 13–17)
HGB BLD-MCNC: 8.9 G/DL — LOW (ref 13–17)
HMPV RNA SPEC QL NAA+PROBE: NOT DETECTED — SIGNIFICANT CHANGE UP
HPIV1 RNA SPEC QL NAA+PROBE: NOT DETECTED — SIGNIFICANT CHANGE UP
HPIV2 RNA SPEC QL NAA+PROBE: NOT DETECTED — SIGNIFICANT CHANGE UP
HPIV3 RNA SPEC QL NAA+PROBE: NOT DETECTED — SIGNIFICANT CHANGE UP
HPIV4 RNA SPEC QL NAA+PROBE: NOT DETECTED — SIGNIFICANT CHANGE UP
HYPOCHROMIA BLD QL: SIGNIFICANT CHANGE UP
IMM GRANULOCYTES NFR BLD AUTO: 0.8 % — SIGNIFICANT CHANGE UP (ref 0–1.5)
INR BLD: 1.22 — HIGH (ref 0.88–1.17)
IRON SATN MFR SERPL: 26 UG/DL — LOW (ref 45–165)
IRON SATN MFR SERPL: 300 UG/DL — SIGNIFICANT CHANGE UP (ref 155–535)
KETONES UR-MCNC: NEGATIVE — SIGNIFICANT CHANGE UP
LDH SERPL L TO P-CCNC: 331 U/L — HIGH (ref 135–225)
LEUKOCYTE ESTERASE UR-ACNC: NEGATIVE — SIGNIFICANT CHANGE UP
LIPID PNL WITH DIRECT LDL SERPL: 58 MG/DL — SIGNIFICANT CHANGE UP
LYMPHOCYTES # BLD AUTO: 0.39 K/UL — LOW (ref 1–3.3)
LYMPHOCYTES # BLD AUTO: 6.4 % — LOW (ref 13–44)
LYMPHOCYTES NFR SPEC AUTO: 10 % — LOW (ref 13–44)
MANUAL SMEAR VERIFICATION: SIGNIFICANT CHANGE UP
MCHC RBC-ENTMCNC: 24.6 PG — LOW (ref 27–34)
MCHC RBC-ENTMCNC: 26.3 PG — LOW (ref 27–34)
MCHC RBC-ENTMCNC: 27 % — LOW (ref 32–36)
MCHC RBC-ENTMCNC: 30.9 % — LOW (ref 32–36)
MCV RBC AUTO: 85 FL — SIGNIFICANT CHANGE UP (ref 80–100)
MCV RBC AUTO: 91 FL — SIGNIFICANT CHANGE UP (ref 80–100)
MONOCYTES # BLD AUTO: 0.74 K/UL — SIGNIFICANT CHANGE UP (ref 0–0.9)
MONOCYTES NFR BLD AUTO: 12.1 % — SIGNIFICANT CHANGE UP (ref 2–14)
MONOCYTES NFR BLD: 9 % — SIGNIFICANT CHANGE UP (ref 2–9)
NEUTROPHIL AB SER-ACNC: 78 % — HIGH (ref 43–77)
NEUTROPHILS # BLD AUTO: 4.93 K/UL — SIGNIFICANT CHANGE UP (ref 1.8–7.4)
NEUTROPHILS NFR BLD AUTO: 80.2 % — HIGH (ref 43–77)
NEUTS BAND # BLD: 1 % — SIGNIFICANT CHANGE UP (ref 0–6)
NITRITE UR-MCNC: NEGATIVE — SIGNIFICANT CHANGE UP
NRBC # BLD: 0 /100WBC — SIGNIFICANT CHANGE UP
NRBC # FLD: 0 K/UL — SIGNIFICANT CHANGE UP (ref 0–0)
NRBC # FLD: 0.02 K/UL — SIGNIFICANT CHANGE UP (ref 0–0)
OB PNL STL: POSITIVE — SIGNIFICANT CHANGE UP
OSMOLALITY SERPL: 270 MOSMO/KG — LOW (ref 275–295)
OVALOCYTES BLD QL SMEAR: SLIGHT — SIGNIFICANT CHANGE UP
PH UR: 5.5 — SIGNIFICANT CHANGE UP (ref 5–8)
PLATELET # BLD AUTO: 141 K/UL — LOW (ref 150–400)
PLATELET # BLD AUTO: 173 K/UL — SIGNIFICANT CHANGE UP (ref 150–400)
PLATELET COUNT - ESTIMATE: NORMAL — SIGNIFICANT CHANGE UP
PMV BLD: 11.6 FL — SIGNIFICANT CHANGE UP (ref 7–13)
PMV BLD: 11.8 FL — SIGNIFICANT CHANGE UP (ref 7–13)
POIKILOCYTOSIS BLD QL AUTO: SLIGHT — SIGNIFICANT CHANGE UP
POTASSIUM SERPL-MCNC: 4.5 MMOL/L — SIGNIFICANT CHANGE UP (ref 3.5–5.3)
POTASSIUM SERPL-SCNC: 4.5 MMOL/L — SIGNIFICANT CHANGE UP (ref 3.5–5.3)
PROT SERPL-MCNC: 4.5 G/DL — LOW (ref 6–8.3)
PROT UR-MCNC: 10 — SIGNIFICANT CHANGE UP
PROTHROM AB SERPL-ACNC: 14 SEC — HIGH (ref 9.8–13.1)
RBC # BLD: 2.44 M/UL — LOW (ref 4.2–5.8)
RBC # BLD: 3.39 M/UL — LOW (ref 4.2–5.8)
RBC # FLD: 15.6 % — HIGH (ref 10.3–14.5)
RBC # FLD: 16.4 % — HIGH (ref 10.3–14.5)
RETICS #: 190 K/UL — HIGH (ref 25–125)
RETICS/RBC NFR: 5.6 % — HIGH (ref 0.5–2.5)
RH IG SCN BLD-IMP: POSITIVE — SIGNIFICANT CHANGE UP
RH IG SCN BLD-IMP: POSITIVE — SIGNIFICANT CHANGE UP
RSV RNA SPEC QL NAA+PROBE: NOT DETECTED — SIGNIFICANT CHANGE UP
RV+EV RNA SPEC QL NAA+PROBE: NOT DETECTED — SIGNIFICANT CHANGE UP
SODIUM SERPL-SCNC: 130 MMOL/L — LOW (ref 135–145)
SP GR SPEC: 1.02 — SIGNIFICANT CHANGE UP (ref 1–1.04)
TRIGL SERPL-MCNC: 120 MG/DL — SIGNIFICANT CHANGE UP (ref 10–149)
TROPONIN T, HIGH SENSITIVITY: 27 NG/L — SIGNIFICANT CHANGE UP (ref ?–14)
UIBC SERPL-MCNC: 273.8 UG/DL — SIGNIFICANT CHANGE UP (ref 110–370)
UROBILINOGEN FLD QL: NORMAL — SIGNIFICANT CHANGE UP
VARIANT LYMPHS # BLD: 2 % — SIGNIFICANT CHANGE UP
VIT B12 SERPL-MCNC: 552 PG/ML — SIGNIFICANT CHANGE UP (ref 200–900)
WBC # BLD: 5.29 K/UL — SIGNIFICANT CHANGE UP (ref 3.8–10.5)
WBC # BLD: 6.14 K/UL — SIGNIFICANT CHANGE UP (ref 3.8–10.5)
WBC # FLD AUTO: 5.29 K/UL — SIGNIFICANT CHANGE UP (ref 3.8–10.5)
WBC # FLD AUTO: 6.14 K/UL — SIGNIFICANT CHANGE UP (ref 3.8–10.5)

## 2020-01-31 PROCEDURE — 71045 X-RAY EXAM CHEST 1 VIEW: CPT | Mod: 26

## 2020-01-31 PROCEDURE — 99222 1ST HOSP IP/OBS MODERATE 55: CPT | Mod: GC

## 2020-01-31 PROCEDURE — 12345: CPT | Mod: NC,GC

## 2020-01-31 RX ORDER — PANTOPRAZOLE SODIUM 20 MG/1
1 TABLET, DELAYED RELEASE ORAL
Qty: 0 | Refills: 0 | DISCHARGE
Start: 2020-01-31

## 2020-01-31 RX ORDER — PANTOPRAZOLE SODIUM 20 MG/1
8 TABLET, DELAYED RELEASE ORAL
Qty: 80 | Refills: 0 | Status: DISCONTINUED | OUTPATIENT
Start: 2020-01-31 | End: 2020-02-01

## 2020-01-31 RX ORDER — INFLUENZA VIRUS VACCINE 15; 15; 15; 15 UG/.5ML; UG/.5ML; UG/.5ML; UG/.5ML
0.5 SUSPENSION INTRAMUSCULAR ONCE
Refills: 0 | Status: DISCONTINUED | OUTPATIENT
Start: 2020-01-31 | End: 2020-02-01

## 2020-01-31 RX ORDER — DIGOXIN 250 MCG
0.12 TABLET ORAL DAILY
Refills: 0 | Status: DISCONTINUED | OUTPATIENT
Start: 2020-01-31 | End: 2020-02-01

## 2020-01-31 RX ORDER — FUROSEMIDE 40 MG
20 TABLET ORAL AT BEDTIME
Refills: 0 | Status: DISCONTINUED | OUTPATIENT
Start: 2020-01-31 | End: 2020-02-01

## 2020-01-31 RX ORDER — FERROUS SULFATE 325(65) MG
325 TABLET ORAL DAILY
Refills: 0 | Status: DISCONTINUED | OUTPATIENT
Start: 2020-01-31 | End: 2020-01-31

## 2020-01-31 RX ORDER — PANTOPRAZOLE SODIUM 20 MG/1
1 TABLET, DELAYED RELEASE ORAL
Qty: 60 | Refills: 0
Start: 2020-01-31 | End: 2020-02-29

## 2020-01-31 RX ORDER — ACETAMINOPHEN 500 MG
975 TABLET ORAL ONCE
Refills: 0 | Status: COMPLETED | OUTPATIENT
Start: 2020-01-31 | End: 2020-01-31

## 2020-01-31 RX ORDER — PIPERACILLIN AND TAZOBACTAM 4; .5 G/20ML; G/20ML
3.38 INJECTION, POWDER, LYOPHILIZED, FOR SOLUTION INTRAVENOUS ONCE
Refills: 0 | Status: COMPLETED | OUTPATIENT
Start: 2020-01-31 | End: 2020-01-31

## 2020-01-31 RX ORDER — FUROSEMIDE 40 MG
40 TABLET ORAL DAILY
Refills: 0 | Status: DISCONTINUED | OUTPATIENT
Start: 2020-01-31 | End: 2020-02-01

## 2020-01-31 RX ORDER — PANTOPRAZOLE SODIUM 20 MG/1
40 TABLET, DELAYED RELEASE ORAL ONCE
Refills: 0 | Status: COMPLETED | OUTPATIENT
Start: 2020-01-31 | End: 2020-01-31

## 2020-01-31 RX ADMIN — PIPERACILLIN AND TAZOBACTAM 200 GRAM(S): 4; .5 INJECTION, POWDER, LYOPHILIZED, FOR SOLUTION INTRAVENOUS at 07:15

## 2020-01-31 RX ADMIN — Medication 40 MILLIGRAM(S): at 09:05

## 2020-01-31 RX ADMIN — PANTOPRAZOLE SODIUM 10 MG/HR: 20 TABLET, DELAYED RELEASE ORAL at 08:00

## 2020-01-31 RX ADMIN — Medication 975 MILLIGRAM(S): at 02:56

## 2020-01-31 RX ADMIN — Medication 0.12 MILLIGRAM(S): at 09:05

## 2020-01-31 RX ADMIN — PANTOPRAZOLE SODIUM 40 MILLIGRAM(S): 20 TABLET, DELAYED RELEASE ORAL at 04:46

## 2020-01-31 RX ADMIN — Medication 20 MILLIGRAM(S): at 22:39

## 2020-01-31 RX ADMIN — PANTOPRAZOLE SODIUM 10 MG/HR: 20 TABLET, DELAYED RELEASE ORAL at 18:50

## 2020-01-31 NOTE — H&P ADULT - PROBLEM SELECTOR PLAN 7
-2/2 Fontan associated liver disease  -c/w management as above. -likely 2/2 hypoalbuminemia  7.7--> corrects to 8.9

## 2020-01-31 NOTE — DISCHARGE NOTE PROVIDER - CARE PROVIDER_API CALL
Abdulkadir Cota)  Gastroenterology; Internal Medicine  [address] [place] [id number]  Phone: [phone]  Fax: [phone]  Follow Up Time: 1 week

## 2020-01-31 NOTE — ED ADULT NURSE NOTE - OBJECTIVE STATEMENT
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 recent admit for anemia last week, transfused 1 unit and signed out AMA stating he felt better. followed up with cardiologist. Cardiologist cleared him. suspect GI bleed due to black stools x 1-2 weeks, today + diarrhea. Pt pale, tachycardic, tachypneic and hypoxic to 80s on RA. resting comfortably on 2L NC. family at bedside. Left hand 20g placed and labs sent as ordered. BS clear. report given to primary RN.

## 2020-01-31 NOTE — PROGRESS NOTE ADULT - PROBLEM SELECTOR PLAN 4
-Pt endorses loose sticky black stools  -Likely melena in the setting of suspect UGIB  -however given the fever, will send stool studies   -s/p 1 dose of zoysn for now.

## 2020-01-31 NOTE — ED PROVIDER NOTE - PHYSICAL EXAMINATION
GEN: NAD, awake, pale complexion  HEENT: NCAT, MMM, normal conjunctiva, perrl  CHEST/LUNGS: Non-tachypneic, CTAB, bilateral breath sounds  CARDIAC: Non-tachycardic, s1s2, normal perfusion, no peripheral edema  ABDOMEN: Soft, NTND, No rebound/guarding  MSK: No joint tenderness, no gross deformity of extremities  SKIN: No rashes, no petechiae, no vesicles  NEURO: CN grossly intact, normal coordination, no focal motor or sensory deficits  PSYCH: Alert, appropriate, cooperative GEN: NAD, awake, pale complexion  HEENT: NCAT, MMM, normal conjunctiva, perrl  CHEST/LUNGS: Non-tachypneic, CTAB, bilateral breath sounds  CARDIAC: Non-tachycardic, systolic murmur, no peripheral edema  ABDOMEN: Soft, NTND, No rebound/guarding  MSK: No joint tenderness, no gross deformity of extremities  SKIN: No rashes, no petechiae, no vesicles  NEURO: CN grossly intact, normal coordination, no focal motor or sensory deficits  PSYCH: Alert, appropriate, cooperative

## 2020-01-31 NOTE — PROGRESS NOTE ADULT - PROBLEM SELECTOR PLAN 8
-2/2 Fontan associated liver disease  -c/w management as above.  -Hypoalbuminemia, also with low protein, will send urine pr/cr.

## 2020-01-31 NOTE — H&P ADULT - PROBLEM SELECTOR PLAN 2
likely 2/2 anemia  vs less likely cardiac  -Blood transfusion as above  -Will call Dr. Hernandez in the AM (cardiologist)

## 2020-01-31 NOTE — H&P ADULT - NSHPSOCIALHISTORY_GEN_ALL_CORE
lives with parents, former heavy drinker but stopped heavy drinking more than a year ago, now occassionally has a drink, last drink 1 month ago. active PPD smoking, and daily marijuana

## 2020-01-31 NOTE — H&P ADULT - PROBLEM SELECTOR PLAN 1
-likely 2/2 UGIB on top of iron defficiency anemia (of unknown etiology)  -s/p protonix IV in the ED  -s/p 1UPRBC on 1/25  -will give 2U PRBC  -start on protonix gtt  -f/u GI recs  -post transfusion CBC  -transfuse if <7  -active T/S  -f/u Iron studies, ferttin, retic, b12, folate  -NPO except meds for now  -hold aspirin -likely 2/2 UGIB on top of iron deficiency anemia (of unknown etiology)  -s/p protonix IV in the ED  -s/p 1UPRBC on 1/25  -will give 2U PRBC  -start on protonix gtt  -f/u GI recs  -post transfusion CBC  -transfuse if <7  -active T/S  -f/u Iron studies, ferttin, retic, b12, folate  -NPO except meds for now  -hold aspirin

## 2020-01-31 NOTE — H&P ADULT - NSHPREVIEWOFSYSTEMS_GEN_ALL_CORE
REVIEW OF SYSTEMS:    CONSTITUTIONAL:+ general  weakness/fatigue, + fevers   EYES/ENT: No visual changes;  No vertigo or throat pain   NECK: No pain or stiffness  RESPIRATORY: + shortness of breath. No cough, wheezing, hemoptysis;  CARDIOVASCULAR: No chest pain or palpitations  GASTROINTESTINAL: + loose stools dark stools. intermittent pain with eating, no current abdominal or epigastric pain. No nausea, vomiting, or hematemesis; No diarrhea or constipation. No melena or hematochezia.  GENITOURINARY: No dysuria, frequency or hematuria  NEUROLOGICAL: No numbness or weakness  SKIN: Pale. No itching, burning, rashes, or lesions   All other review of systems is negative unless indicated above.

## 2020-01-31 NOTE — H&P ADULT - PROBLEM SELECTOR PLAN 3
-Likely melena in the setting of suspect UGIB  -however given the fever, will send stool studies including c.diff  -monitor off antibiotics -fever and tachypnea. only with loose stools, no URI sxs. CXR negative  -Send UA and Stool studies.   -monitor off abx

## 2020-01-31 NOTE — CONSULT NOTE ADULT - SUBJECTIVE AND OBJECTIVE BOX
This is a chart note for Mr. Acevedo who I follow in the [facility] and was recently seen by me at [facility] [address] [facility] (see note in HIE/[employer]).  During that visit, we felt he was generally doing well with no signs or symptoms of heart failure nor arrhythmias.  He did not tell us about his GI issues as he generally keeps most information to himself.  He now presents with melena and severe anemia with a Hgb down to 6.  He did receive one unit of blood in the [facility] ED this week but re-presented last evening to the ED.  His Hgb was again 6 and he is scheduled for an endoscopic evaluation in order to try and find his source of bleeding.    The purpose of this note is to give cardiology "clearance" for the endoscopic procedure today.  My recommendation is that we try and get his Hgb up to 10.  Having a more stable oxygen carrying capacity during the procedure.  A cardiac anesthesiologist should be part of the team during the procedure.  He has been arrhythmia-free but for safety issue, this would be ideal.    I can be reached on my Cell Phone for the quickest  response at [phone] (NOTE:  the 238 is not a Typo).    Thanks    Demetrio Hernandez M.D., JUNIE, MBI  Professor of Pediatrics and  Medical Director, Adult Congenital Heart Disease Program  [facility] [employer] [facility]

## 2020-01-31 NOTE — PROGRESS NOTE ADULT - PROBLEM SELECTOR PLAN 3
-fever and tachypnea. only with loose stools, no URI sxs. WBC wnl, CXR negative, UA negative, RVP negative  -monitor off abx

## 2020-01-31 NOTE — ED PROVIDER NOTE - ATTENDING CONTRIBUTION TO CARE
29M h/o hypoplastic L heart s/p fontan procedure, HTN, anemia presents with SOB  Seen 5d ago for VOSS, fatigue, dizziness, found to have anemia and given transfusion with improvement in symptoms. Recommended admission for transfusion and cardiac eval, but patient declined admission at that time because he felt better. Now symptoms have returned. On exam pale appearing, nad, mmm, rrr, lungs CTA b/l, abd soft NT/ND, 2+ pulses, no edema, no rash, alert, speech clear. Labs with worsening anemia, guaiac positive. Plan for admission for eval GIB and symptomatic anemia, will need a transfusion.

## 2020-01-31 NOTE — PROGRESS NOTE ADULT - PROBLEM SELECTOR PLAN 1
-likely 2/2 UGIB on top of iron deficiency anemia (of unknown etiology)  -s/p protonix IV in the ED  -s/p 1UPRBC on 1/25  -will give 2U PRBC  -GI consulted and on board. Recs reviewed.  -start on protonix gtt 8mg/h  -post transfusion CBC  -transfuse if <7  -active T/S  -f/u Iron studies, ferritin, retic, b12, folate  -NPO except meds for now  -hold aspirin  - plan for upper endoscopy, when patient is medically optimized   - will need cardiology clearance for the procedure

## 2020-01-31 NOTE — H&P ADULT - ASSESSMENT
28 YO M with MHx of Hypoplastic Left heart s/p fontan procedure on aspirin c/b FALD, and anemia of unknown source who presents with shortness of breath, fatigue,  and light headedness since yesterday.

## 2020-01-31 NOTE — H&P ADULT - HISTORY OF PRESENT ILLNESS
30 YO M with MHx of Hypoplastic Left heart s/p fontan procedure on aspirin c/b FALD, and anemia of unknown source who presents with shortness of breath, fatigue,  and light headedness since yesterday. Pt states that in August he was hospitalized for generalized edema and anemia. The Edema was contributed to FALD vs Cardiac related which has been much improved since starting diuretic therapy. His anemia was found to be iron deficiency anemia, and he was started on iron pills. Since that time, he has had dark but formed stools. However, one week ago, he started to get pale, short of breath, fatigued, and light headed a/w intermittent abd pain with eating, so he came to the ED where his hgb was 7.2, and he received 1UPRBC. He was advised to stay for further workup, but he left AMA. His symptoms returned yesterday, and he also developed loose darker stools a/w fever with Tmax: 100.7F so he came back to the ED. He denies any chest pain, palpitaions, abd pain, nausea, vomiting, runny nose, sore throat, cough, or dysuria.     In the ED: Tmax: 101.5F, BP: 105-127/41-54, HR 70-80's, resp 20 sat 94% on 2LNC. Labs:wbc: 6.14, hgb= 6, MCV=91, RDW=16.4, occult blood positive. CXR with clear lungs.Pt received protonix 40mg IVP.

## 2020-01-31 NOTE — SBIRT NOTE ADULT - NSSBIRTDRGBRIEFINTDET_GEN_A_CORE
patient declined any resources stated he does not have an issue with his usage and he does not affect his daily functioning.

## 2020-01-31 NOTE — H&P ADULT - PROBLEM SELECTOR PLAN 9
DVT PPX  -No chemo PPx Transitions of Care Status:  1.  Name of PCP: Dr. Russell Aguilar [phone]  2.  PCP Contacted on Admission: [ ] Y    [ x] N    3.  PCP contacted at Discharge: [ ] Y    [ ] N    [ ] N/A  4.  Post-Discharge Appointment Date and Location:  5.  Summary of Handoff given to PCP:

## 2020-01-31 NOTE — CHART NOTE - NSCHARTNOTEFT_GEN_A_CORE
I informed this patient of the need for further medical evaluation and care given the patient's current medical condition.   This patient declined further medical evaluation and treatment.  I informed this patient of the benefits of further medical evaluation and care at this facility.  I informed this patient of the risks of leaving against our medical advice without properly completing our evaluation today that include illness, injury, permanent disability and even death.  The patient was also informed about alternatives.  I informed the patient of the possible necessity for hospital admission depending on future findings.   At the time of discussion this patient maintained full faculties of judgement and medical decision making capacity.  In accordance with this patient's wishes the patient was discharged from the emergency department against our medical advice in stable condition with normal vital signs in no acute distress.    Pt endorses that he does not want to stay until Monday for an endoscopy that is not guaranteed to happen. He states that he will stay for blood transfusions until his hemoglobin is greater than 10 and will then leave for home so that he may enjoy the Super Bowl on Sunday. He also endorses that he has another GI appointment on Wednesday and that he will follow up there.    Luis Blount  EM/IM  PGY-2

## 2020-01-31 NOTE — DISCHARGE NOTE PROVIDER - NSDCCPCAREPLAN_GEN_ALL_CORE_FT
PRINCIPAL DISCHARGE DIAGNOSIS  Diagnosis: Anemia  Assessment and Plan of Treatment: You were seen at [facility] for your anemia. We believe that you had an underlying anemia that has gotten worse due to an active bleed in your intestines. We believe that it would be in your best interest to stay and be monitored, be given blood transfusions as needed, and to be scoped on Monday by the GI doctors. You are signing out against medical advice (AMA) and per our discussion, you understand that your bleeding may worsen and you may die or need to come back for additional transfusions. Please come back if your symptoms come back or worsen (lightheadedness, fatigue, dizziness, difficulty breathing, etc.) Discontinue taking your aspirin and avoid other blood thinners such as Tordol. As discussed, please follow up with your GI doctor outpatient, appointment scheduled for next Wednesday 2/5/2020.

## 2020-01-31 NOTE — H&P ADULT - NSHPPHYSICALEXAM_GEN_ALL_CORE
.  VITAL SIGNS:  T(C): 37.6 (01-31-20 @ 04:31), Max: 38.6 (01-31-20 @ 02:48)  T(F): 99.7 (01-31-20 @ 04:31), Max: 101.5 (01-31-20 @ 02:48)  HR: 86 (01-31-20 @ 05:34) (78 - 115)  BP: 104/44 (01-31-20 @ 05:34) (97/47 - 127/41)  BP(mean): 57 (01-31-20 @ 03:20) (53 - 57)  RR: 17 (01-31-20 @ 05:34) (17 - 24)  SpO2: 99% (01-31-20 @ 05:34) (95% - 100%)  Wt(kg): --    PHYSICAL EXAM:    Constitutional: WDWN resting comfortably in bed; NAD  Head: NC/AT  Eyes: PERRL, EOMI, anicteric sclera  ENT: no nasal discharge, MMM  Neck: supple; no JVD appreciated  Respiratory: CTA B/L; no W/R/R, no increased work of breathing  Cardiac: +S1/S2; RRR; no M/R/G  Gastrointestinal: soft, but Mild tender in the epigastric region to palpation.  +BSx4  Extremities: WWP, no cyanosis; no peripheral edema  Musculoskeletal: NROM x4; no joint swelling, tenderness or erythema  Vascular: 2+ radial, DP pulses B/L  Dermatologic: Pale appearing. skin warm, dry and intact  Neurologic: Alert and orientedx3, no focal deficits appreciated  Psychiatric: affect and characteristics of appearance, verbalizations, behaviors are appropriate

## 2020-01-31 NOTE — PROVIDER CONTACT NOTE (OTHER) - REASON
Bed: ED22  Expected date:   Expected time:   Means of arrival:   Comments:  Kerri - 57 M possible sepsis eta 1902  
/51
114/52

## 2020-01-31 NOTE — DISCHARGE NOTE PROVIDER - NSDCMRMEDTOKEN_GEN_ALL_CORE_FT
Colace 100 mg oral capsule: 1 cap(s) orally once a day, As Needed  digoxin 125 mcg (0.125 mg) oral tablet: 1 tab(s) orally once a day  ferrous sulfate 325 mg (65 mg elemental iron) oral tablet: 1 tab(s) orally once a day  Lasix 20 mg oral tablet: 1 tab(s) orally once a day (at bedtime)  Lasix 40 mg oral tablet: 1 tab(s) orally once a day  lisinopril 2.5 mg oral tablet: 1 tab(s) orally once a day  pantoprazole 40 mg oral delayed release tablet: 1 tab(s) orally 2 times a day  spironolactone 25 mg oral tablet: 1 tab(s) orally once a day Colace 100 mg oral capsule: 1 cap(s) orally once a day, As Needed  digoxin 125 mcg (0.125 mg) oral tablet: 1 tab(s) orally once a day  ferrous sulfate 325 mg (65 mg elemental iron) oral tablet: 1 tab(s) orally once a day  Lasix 20 mg oral tablet: 1 tab(s) orally once a day (at bedtime)  Lasix 40 mg oral tablet: 1 tab(s) orally once a day  lisinopril 2.5 mg oral tablet: 1 tab(s) orally once a day  pantoprazole 40 mg oral delayed release tablet: 1 tab(s) orally 2 times a day  pantoprazole 40 mg oral delayed release tablet: 1 tab(s) orally 2 times a day   spironolactone 25 mg oral tablet: 1 tab(s) orally once a day

## 2020-01-31 NOTE — CONSULT NOTE ADULT - CONSULT REASON
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
anemia

## 2020-01-31 NOTE — PROGRESS NOTE ADULT - PROBLEM SELECTOR PLAN 9
Transitions of Care Status:  1.  Name of PCP: Dr. Russell Aguilar [phone]  2.  PCP Contacted on Admission: [ ] Y    [ x] N    3.  PCP contacted at Discharge: [ ] Y    [ ] N    [ ] N/A  4.  Post-Discharge Appointment Date and Location:  5.  Summary of Handoff given to PCP:

## 2020-01-31 NOTE — PROVIDER CONTACT NOTE (OTHER) - ACTION/TREATMENT ORDERED:
MD notified. no new interventions ordered at this time. continue to monitor
MD notified. no new interventions ordered at this time. continue to monitor

## 2020-01-31 NOTE — PROVIDER CONTACT NOTE (OTHER) - BACKGROUND
Patient admitted for anemia, PMH of HLHS, liver disease
Patient admitted for anemia, PMH of HLHS, liver disease

## 2020-01-31 NOTE — H&P ADULT - PROBLEM SELECTOR PLAN 8
Transitions of Care Status:  1.  Name of PCP: Dr. Russell Aguilar [phone]  2.  PCP Contacted on Admission: [ ] Y    [ x] N    3.  PCP contacted at Discharge: [ ] Y    [ ] N    [ ] N/A  4.  Post-Discharge Appointment Date and Location:  5.  Summary of Handoff given to PCP: -2/2 Fontan associated liver disease  -c/w management as above. -2/2 Fontan associated liver disease  -c/w management as above.  -Hypoalbuminemia, also with low protein, will send urine pr/cr.

## 2020-01-31 NOTE — CONSULT NOTE ADULT - SUBJECTIVE AND OBJECTIVE BOX
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     As per the patient, he was admitted in 2019 for generalized edema and anemia found to be 2/2 liver vs cardiac reasons and has been much improved since starting diuretic therapy. His anemia was found to be iron deficiency anemia, and he was started on iron pills. Since that time, he has had dark but formed stools. However, one week ago, he started to get pale, short of breath, fatigued, and light headed. He also complained of intermittent abdominal pain with eating. In the ER on 2020: Hb was 7.2, and he received 1UPRBC. He was advised to stay for further workup, but he left AMA. His symptoms returned yesterday, and he also developed loose darker stools associated with fever with Tmax: 100.7F so he came back to the ED. He denies any chest pain, palpitaions, abd pain, nausea, vomiting, runny nose, sore throat, cough, or dysuria.    Allergies:  No Known Allergies      Home Medications:  * Patient Currently Takes Medications as of 2020 06:16 documented in Structured Notes  · 	digoxin 125 mcg (0.125 mg) oral tablet: Last Dose Taken:  , 1 tab(s) orally once a day  · 	Lasix 40 mg oral tablet: Last Dose Taken:  , 1 tab(s) orally once a day  · 	Lasix 20 mg oral tablet: Last Dose Taken:  , 1 tab(s) orally once a day (at bedtime)  · 	spironolactone 25 mg oral tablet: Last Dose Taken:  , 1 tab(s) orally once a day  · 	lisinopril 2.5 mg oral tablet: Last Dose Taken:  , 1 tab(s) orally once a day  · 	ferrous sulfate 325 mg (65 mg elemental iron) oral tablet: Last Dose Taken:  , 1 tab(s) orally once a day  · 	Colace 100 mg oral capsule: Last Dose Taken:  , 1 cap(s) orally once a day, As Needed    Hospital Medications:  digoxin     Tablet 0.125 milliGRAM(s) Oral daily  furosemide    Tablet 20 milliGRAM(s) Oral at bedtime  furosemide    Tablet 40 milliGRAM(s) Oral daily  pantoprazole Infusion 8 mG/Hr IV Continuous <Continuous>      PMHX/PSHX:  HLHS (hypoplastic left heart syndrome)  S/P Fontan procedure      Family history:    Denies family history of colon cancer, liver cancer, uterine cancer, ovarian cancer, breast cancer, gastric ulcers, colon polyps, gallstones    Social History:   lives with parents, former heavy drinker but stopped heavy drinking more than a year ago, now occassionally has a drink, last drink 1 month ago.   active PPD smoking  daily marijuana      ROS:     General:  No wt loss, fevers, chills, night sweats, fatigue,   Eyes:  Good vision, no reported pain  ENT:  No sore throat, pain, runny nose, dysphagia  CV:  No pain, palpitations, hypo/hypertension  Resp:  No dyspnea, cough, tachypnea, wheezing  GI:  See HPI  :  No pain, bleeding, incontinence, nocturia  Muscle:  No pain, weakness  Neuro:  No weakness, tingling, memory problems  Psych:  No fatigue, insomnia, mood problems, depression  Endocrine:  No polyuria, polydipsia, cold/heat intolerance  Heme:  No petechiae, ecchymosis, easy bruisability  Skin:  No rash, edema      PHYSICAL EXAM:     GENERAL:  Appears stated age  HEENT:  NC/AT  CHEST:  Full & symmetric excursion  HEART:  Regular rhythm, S1, S2  ABDOMEN:  Soft, non-tender, non-distended  EXTREMITIES:  no edema  SKIN:  No rash  NEURO:  Alert, oriented    Vital Signs:  Vital Signs Last 24 Hrs  T(C): 37.6 (2020 04:31), Max: 38.6 (2020 02:48)  T(F): 99.7 (2020 04:31), Max: 101.5 (2020 02:48)  HR: 86 (2020 05:34) (78 - 115)  BP: 104/44 (2020 05:34) (97/47 - 127/41)  BP(mean): 57 (2020 03:20) (53 - 57)  RR: 17 (2020 05:34) (17 - 24)  SpO2: 99% (2020 05:34) (95% - 100%)  Daily Height in cm: 167.64 (2020 01:25)    Daily     LABS:                        6.0    6.14  )-----------( 173      ( 2020 02:02 )             22.2         130<L>  |  98  |  22  ----------------------------<  98  4.5   |  20<L>  |  1.05    Ca    7.7<L>      2020 02:02    TPro  4.5<L>  /  Alb  2.5<L>  /  TBili  0.4  /  DBili  x   /  AST  43<H>  /  ALT  20  /  AlkPhos  106      LIVER FUNCTIONS - ( 2020 02:02 )  Alb: 2.5 g/dL / Pro: 4.5 g/dL / ALK PHOS: 106 u/L / ALT: 20 u/L / AST: 43 u/L / GGT: x           PT/INR - ( 2020 02:02 )   PT: 14.0 SEC;   INR: 1.22          PTT - ( 2020 02:02 )  PTT:26.4 SEC  Urinalysis Basic - ( 2020 06:50 )    Color: YELLOW / Appearance: CLEAR / S.017 / pH: 5.5  Gluc: NEGATIVE / Ketone: NEGATIVE  / Bili: NEGATIVE / Urobili: NORMAL   Blood: NEGATIVE / Protein: 10 / Nitrite: NEGATIVE   Leuk Esterase: NEGATIVE / RBC: x / WBC x   Sq Epi: x / Non Sq Epi: x / Bacteria: x    Imaging: Chief Complaint:  Patient is a 29y old  Male who presents with a chief complaint of Symptomatic anemia (2020 05:56)      HPI:  29 year old male with Hypoplastic Left heart s/p fontan procedure on aspirin c/b FALD (fontan associated liver disease), and anemia of unknown source who presented to the emergency room with chief complain of shortness of breath, fatigue, and light headedness for one day.     As per the patient, he was admitted in 2019 for generalized edema and anemia found to be 2/2 liver vs cardiac reasons of LE edema, which has much improved since starting diuretic therapy. His anemia was found to be 2/2 iron deficiency (no GI workup was done at that time), and he was started on iron pills. Since that time, he has had dark but formed stools. However, one week ago, he started to get pale, short of breath, fatigued, and light headed. He also complained of intermittent abdominal pain with eating. In the ER on 2020: Hb was 7.2, and he received 1UPRBC. He was advised to stay for further workup, but he left AMA. His symptoms returned yesterday, and he also developed loose darker stools associated with fever with Tmax: 100.7F so he came back to the ED. He denies any chest pain, palpitaions, abd pain, nausea, vomiting, runny nose, sore throat, cough, or dysuria.    He has never had an endoscopy or a colonoscopy.     Allergies:  No Known Allergies      Home Medications:  * Patient Currently Takes Medications as of 2020 06:16 documented in Structured Notes  · 	digoxin 125 mcg (0.125 mg) oral tablet: Last Dose Taken:  , 1 tab(s) orally once a day  · 	Lasix 40 mg oral tablet: Last Dose Taken:  , 1 tab(s) orally once a day  · 	Lasix 20 mg oral tablet: Last Dose Taken:  , 1 tab(s) orally once a day (at bedtime)  · 	spironolactone 25 mg oral tablet: Last Dose Taken:  , 1 tab(s) orally once a day  · 	lisinopril 2.5 mg oral tablet: Last Dose Taken:  , 1 tab(s) orally once a day  · 	ferrous sulfate 325 mg (65 mg elemental iron) oral tablet: Last Dose Taken:  , 1 tab(s) orally once a day  · 	Colace 100 mg oral capsule: Last Dose Taken:  , 1 cap(s) orally once a day, As Needed    Hospital Medications:  digoxin     Tablet 0.125 milliGRAM(s) Oral daily  furosemide    Tablet 20 milliGRAM(s) Oral at bedtime  furosemide    Tablet 40 milliGRAM(s) Oral daily  pantoprazole Infusion 8 mG/Hr IV Continuous <Continuous>      PMHX/PSHX:  HLHS (hypoplastic left heart syndrome)  S/P Fontan procedure      Family history:    Denies family history of colon cancer, liver cancer, uterine cancer, ovarian cancer, breast cancer, gastric ulcers, colon polyps, gallstones    Social History:   lives with parents, former heavy drinker but stopped heavy drinking more than a year ago, now occassionally has a drink, last drink 1 month ago.   active PPD smoking  daily marijuana      ROS:     General:  No wt loss, fevers, chills, night sweats, fatigue,   Eyes:  Good vision, no reported pain  ENT:  No sore throat, pain, runny nose, dysphagia  CV:  No pain, palpitations, hypo/hypertension  Resp:  No dyspnea, cough, tachypnea, wheezing  GI:  See HPI  :  No pain, bleeding, incontinence, nocturia  Muscle:  No pain, weakness  Neuro:  No weakness, tingling, memory problems  Psych:  No fatigue, insomnia, mood problems, depression  Endocrine:  No polyuria, polydipsia, cold/heat intolerance  Heme:  No petechiae, ecchymosis, easy bruisability  Skin:  No rash, edema      PHYSICAL EXAM:     GENERAL:  Appears stated age  HEENT:  NC/AT  CHEST:  Full & symmetric excursion  HEART:  Regular rhythm, S1, S2  ABDOMEN:  Soft, non-tender, non-distended  EXTREMITIES:  no edema  SKIN:  No rash  NEURO:  Alert, oriented    Vital Signs:  Vital Signs Last 24 Hrs  T(C): 37.6 (2020 04:31), Max: 38.6 (2020 02:48)  T(F): 99.7 (2020 04:31), Max: 101.5 (2020 02:48)  HR: 86 (2020 05:34) (78 - 115)  BP: 104/44 (2020 05:34) (97/47 - 127/41)  BP(mean): 57 (2020 03:20) (53 - 57)  RR: 17 (2020 05:34) (17 - 24)  SpO2: 99% (2020 05:34) (95% - 100%)  Daily Height in cm: 167.64 (2020 01:25)    Daily     LABS:                        6.0    6.14  )-----------( 173      ( 2020 02:02 )             22.2         130<L>  |  98  |  22  ----------------------------<  98  4.5   |  20<L>  |  1.05    Ca    7.7<L>      2020 02:02    TPro  4.5<L>  /  Alb  2.5<L>  /  TBili  0.4  /  DBili  x   /  AST  43<H>  /  ALT  20  /  AlkPhos  106      LIVER FUNCTIONS - ( 2020 02:02 )  Alb: 2.5 g/dL / Pro: 4.5 g/dL / ALK PHOS: 106 u/L / ALT: 20 u/L / AST: 43 u/L / GGT: x           PT/INR - ( 2020 02:02 )   PT: 14.0 SEC;   INR: 1.22          PTT - ( 2020 02:02 )  PTT:26.4 SEC  Urinalysis Basic - ( 2020 06:50 )    Color: YELLOW / Appearance: CLEAR / S.017 / pH: 5.5  Gluc: NEGATIVE / Ketone: NEGATIVE  / Bili: NEGATIVE / Urobili: NORMAL   Blood: NEGATIVE / Protein: 10 / Nitrite: NEGATIVE   Leuk Esterase: NEGATIVE / RBC: x / WBC x   Sq Epi: x / Non Sq Epi: x / Bacteria: x    Imaging:

## 2020-01-31 NOTE — H&P ADULT - ATTENDING COMMENTS
30yo M pale and ill appearing patient p/w cc of GIB/anemia. Patient currently HD stable and Hb 6. Will transfuse 2u and consult GI. Ordered for PPI. Trend H&H. On admission, also fever 101. No clear source at the time with clear CXR. RVP and UA pending. Will empirically cover with zosyn for now. F/u cultures.

## 2020-01-31 NOTE — ED PROVIDER NOTE - NS ED ROS FT
GENERAL: weakness, No fever or chills  EYES: no change in vision  HEENT: no trouble swallowing or speaking  CARDIAC: no chest pain or palpitations  PULMONARY: SOB  GI: no abdominal pain, nausea, vomiting, diarrhea, or constipation   : No changes in urination  SKIN: no rashes  NEURO: no headache, numbness, or weakness  MSK: No joint pain

## 2020-01-31 NOTE — H&P ADULT - PROBLEM SELECTOR PLAN 4
- s/p fontan procedure   -c/w lasix and spironolactone  -c/w  digoxin  -hold lisinopril and aspirin -Likely melena in the setting of suspect UGIB  -however given the fever, will send stool studies including c.diff  -monitor off antibiotics -Likely melena in the setting of suspect UGIB  -however given the fever, will send stool studies   -will give a dose of zoysn for now.

## 2020-01-31 NOTE — H&P ADULT - NSHPLABSRESULTS_GEN_ALL_CORE
6.0    6.14  )-----------( 173      ( 31 Jan 2020 02:02 )             22.2       01-31    130<L>  |  98  |  22  ----------------------------<  98  4.5   |  20<L>  |  1.05    Ca    7.7<L>      31 Jan 2020 02:02    TPro  4.5<L>  /  Alb  2.5<L>  /  TBili  0.4  /  DBili  x   /  AST  43<H>  /  ALT  20  /  AlkPhos  106  01-31                  PT/INR - ( 31 Jan 2020 02:02 )   PT: 14.0 SEC;   INR: 1.22          PTT - ( 31 Jan 2020 02:02 )  PTT:26.4 SEC

## 2020-01-31 NOTE — ED PROVIDER NOTE - CLINICAL SUMMARY MEDICAL DECISION MAKING FREE TEXT BOX
29M presenting symptomatic anemia with drop in H/H from recent visit. Unclear source however, guaiac positive.

## 2020-01-31 NOTE — DISCHARGE NOTE PROVIDER - HOSPITAL COURSE
30 YO M with MHx of Hypoplastic Left heart s/p fontan procedure on aspirin c/b FALD, and anemia of unknown source who presents with shortness of breath, fatigue,  and light headedness since yesterday. Pt states that in August he was hospitalized for generalized edema and anemia. The Edema was contributed to FALD vs Cardiac related which has been much improved since starting diuretic therapy. His anemia was found to be iron deficiency anemia, and he was started on iron pills. Since that time, he has had dark but formed stools. However, one week ago, he started to get pale, short of breath, fatigued, and light headed a/w intermittent abd pain with eating, so he came to the ED where his hgb was 7.2, and he received 1UPRBC. He was advised to stay for further workup, but he left AMA. His symptoms returned yesterday, and he also developed loose, sticky darker stools a/w fever with Tmax: 100.7F so he came back to the ED. He denies any chest pain, palpitations, abd pain, nausea, vomiting, runny nose, sore throat, cough, or dysuria.         In the ED: Tmax: 101.5F, BP: 105-127/41-54, HR 70-80's, resp 20 sat 94% on 2L NC. Labs: wbc: 6.14, hgb= 6, MCV=91, RDW=16.4, occult blood positive. CXR with clear lungs. Pt received protonix 40mg IVP. Due to the fever, tachypnea and loose stools, he received 1x dose of Zosyn.        After transfer to the floors, he received a total of 4 units of pRBCs, and was continued on Protonix 8mg/h gtt. His infectious workup labs came back negative (RVP, CXR, UA) and he has been afebrile, with a RR of 18 and SaO2 of % on room air. GI was consulted, who recommended cardiology clearance before the endoscopy procedure. His cardiologist, Dr Hernandez, was called and cleared him for a EGD to find the source of the suspected UGIB and recommended that he get blood transfusions to bring his Hgb >10 before the procedure. The pt was not able to be scheduled for the procedure today, and thus wishes to leave AMA once his Hgb > 10, as he does not want to wait until Monday if "nothing is done" in the hospital in the meantime. He has scheduled an outpatient appointment with a GI physician for Wednesday 2/5/2020 and wishes to pursue the workup of his GIB as an outpatient. His condition was discussed at length and he understands that the risk of leaving AMA includes worsening bleeding and possibly death.

## 2020-01-31 NOTE — H&P ADULT - PROBLEM SELECTOR PLAN 6
-likely 2/2 hypoalbuminemia  7.7--> corrects to 8.9 -Likely 2/2 hypovolemia Vs liver disease (FALD)  -f/u FeURea -Likely 2/2 hypovolemia Vs liver disease (FALD)  -f/u sodium studies

## 2020-01-31 NOTE — ED PROVIDER NOTE - OBJECTIVE STATEMENT
29M with PMH of Hypoplastic Left Heart Syndrome s/p Fontan procedure, HTN, anemia of unknown origin on iron presenting with complaint of sob, weakness and anemia. Patient with recent visit to [facility] found to be anemia to 7.2 s/p 1 transfusion and left AMA. Patient endorses about 1 week hx of dark stools. No hx of transfusions prior to 5 days ago. Denies any bleeding or trauma. Denies fever, chills, cp, n/v/d, dizziness, dysuria

## 2020-01-31 NOTE — ED ADULT TRIAGE NOTE - CHIEF COMPLAINT QUOTE
Presents to ED for SOB.  Patient experiencing SOB and dizziness since leaving the hospital on Saturday.  Skin is pale and complaining of weakness.  Patient was discharged from the hospital on Saturday and received a blood transfusion while in the hospital.  Sat @ 83% RA, placed on oxygen 2L and now sat @100%.  History of hypoplastic left heart syndrome.

## 2020-01-31 NOTE — DISCHARGE NOTE PROVIDER - NSDCFUSCHEDAPPT_GEN_ALL_CORE_FT
HUBERT AGUILAR ; 02/05/2020 ; [facility] HUBERT AGUILAR ; 02/05/2020 ; [facility]

## 2020-01-31 NOTE — H&P ADULT - PROBLEM SELECTOR PLAN 5
-Likely 2/2 hypovolemia Vs liver disease (FALD)  -f/u FeURea - s/p fontan procedure   -c/w lasix and spironolactone  -c/w  digoxin  -hold lisinopril and aspirin

## 2020-02-01 ENCOUNTER — TRANSCRIPTION ENCOUNTER (OUTPATIENT)
Age: 30
End: 2020-02-01

## 2020-02-01 VITALS
OXYGEN SATURATION: 94 % | DIASTOLIC BLOOD PRESSURE: 55 MMHG | RESPIRATION RATE: 18 BRPM | HEART RATE: 89 BPM | SYSTOLIC BLOOD PRESSURE: 101 MMHG | TEMPERATURE: 100 F

## 2020-02-01 LAB
ALBUMIN SERPL ELPH-MCNC: 2.3 G/DL — LOW (ref 3.3–5)
ALP SERPL-CCNC: 98 U/L — SIGNIFICANT CHANGE UP (ref 40–120)
ALT FLD-CCNC: 16 U/L — SIGNIFICANT CHANGE UP (ref 4–41)
ANION GAP SERPL CALC-SCNC: 8 MMO/L — SIGNIFICANT CHANGE UP (ref 7–14)
AST SERPL-CCNC: 21 U/L — SIGNIFICANT CHANGE UP (ref 4–40)
BASOPHILS # BLD AUTO: 0.05 K/UL — SIGNIFICANT CHANGE UP (ref 0–0.2)
BASOPHILS NFR BLD AUTO: 0.9 % — SIGNIFICANT CHANGE UP (ref 0–2)
BILIRUB SERPL-MCNC: 0.6 MG/DL — SIGNIFICANT CHANGE UP (ref 0.2–1.2)
BUN SERPL-MCNC: 19 MG/DL — SIGNIFICANT CHANGE UP (ref 7–23)
CALCIUM SERPL-MCNC: 7.5 MG/DL — LOW (ref 8.4–10.5)
CHLORIDE SERPL-SCNC: 97 MMOL/L — LOW (ref 98–107)
CO2 SERPL-SCNC: 20 MMOL/L — LOW (ref 22–31)
CREAT SERPL-MCNC: 0.98 MG/DL — SIGNIFICANT CHANGE UP (ref 0.5–1.3)
EOSINOPHIL # BLD AUTO: 0.04 K/UL — SIGNIFICANT CHANGE UP (ref 0–0.5)
EOSINOPHIL NFR BLD AUTO: 0.7 % — SIGNIFICANT CHANGE UP (ref 0–6)
GLUCOSE SERPL-MCNC: 129 MG/DL — HIGH (ref 70–99)
HCT VFR BLD CALC: 32.4 % — LOW (ref 39–50)
HGB BLD-MCNC: 10 G/DL — LOW (ref 13–17)
IMM GRANULOCYTES NFR BLD AUTO: 1.4 % — SIGNIFICANT CHANGE UP (ref 0–1.5)
LYMPHOCYTES # BLD AUTO: 0.39 K/UL — LOW (ref 1–3.3)
LYMPHOCYTES # BLD AUTO: 7 % — LOW (ref 13–44)
MAGNESIUM SERPL-MCNC: 2 MG/DL — SIGNIFICANT CHANGE UP (ref 1.6–2.6)
MCHC RBC-ENTMCNC: 27 PG — SIGNIFICANT CHANGE UP (ref 27–34)
MCHC RBC-ENTMCNC: 30.9 % — LOW (ref 32–36)
MCV RBC AUTO: 87.3 FL — SIGNIFICANT CHANGE UP (ref 80–100)
MONOCYTES # BLD AUTO: 0.71 K/UL — SIGNIFICANT CHANGE UP (ref 0–0.9)
MONOCYTES NFR BLD AUTO: 12.8 % — SIGNIFICANT CHANGE UP (ref 2–14)
NEUTROPHILS # BLD AUTO: 4.29 K/UL — SIGNIFICANT CHANGE UP (ref 1.8–7.4)
NEUTROPHILS NFR BLD AUTO: 77.2 % — HIGH (ref 43–77)
NRBC # FLD: 0.02 K/UL — SIGNIFICANT CHANGE UP (ref 0–0)
PHOSPHATE SERPL-MCNC: 2.7 MG/DL — SIGNIFICANT CHANGE UP (ref 2.5–4.5)
PLATELET # BLD AUTO: 152 K/UL — SIGNIFICANT CHANGE UP (ref 150–400)
PMV BLD: 12.3 FL — SIGNIFICANT CHANGE UP (ref 7–13)
POTASSIUM SERPL-MCNC: 3.7 MMOL/L — SIGNIFICANT CHANGE UP (ref 3.5–5.3)
POTASSIUM SERPL-SCNC: 3.7 MMOL/L — SIGNIFICANT CHANGE UP (ref 3.5–5.3)
PROT SERPL-MCNC: 4.2 G/DL — LOW (ref 6–8.3)
RBC # BLD: 3.71 M/UL — LOW (ref 4.2–5.8)
RBC # FLD: 15.5 % — HIGH (ref 10.3–14.5)
SODIUM SERPL-SCNC: 125 MMOL/L — LOW (ref 135–145)
SPECIMEN SOURCE: SIGNIFICANT CHANGE UP
WBC # BLD: 5.56 K/UL — SIGNIFICANT CHANGE UP (ref 3.8–10.5)
WBC # FLD AUTO: 5.56 K/UL — SIGNIFICANT CHANGE UP (ref 3.8–10.5)

## 2020-02-01 RX ADMIN — Medication 0.12 MILLIGRAM(S): at 05:50

## 2020-02-01 RX ADMIN — Medication 40 MILLIGRAM(S): at 05:49

## 2020-02-01 NOTE — PROGRESS NOTE ADULT - SUBJECTIVE AND OBJECTIVE BOX
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      Family history:          PHYSICAL EXAM:     GENERAL:  NAD  HEENT:  NC/AT,  conjunctivae clear, sclera -anicteric  CHEST:  Full & symmetric excursion, no increased  HEART:  Regular rhythm  ABDOMEN:  Soft, non-tender, non-distended, normoactive bowel sounds,  no masses ,no hepato-splenomegaly,   EXTREMITIES:  no cyanosis,clubbing or edema  SKIN:  No rash/erythema/ecchymoses/petechiae/wounds/abscess/warm/dry  NEURO:  Alert, oriented    Vital Signs:  Vital Signs Last 24 Hrs  T(C): 37.5 (2020 05:45), Max: 37.5 (2020 05:45)  T(F): 99.5 (2020 05:45), Max: 99.5 (2020 05:45)  HR: 89 (2020 05:45) (65 - 90)  BP: 101/55 (2020 05:45) (101/55 - 117/62)  BP(mean): --  RR: 18 (2020 05:45) (18 - 18)  SpO2: 94% (2020 05:45) (94% - 100%)  Daily     Daily     LABS:                        10.0   5.56  )-----------( 152      ( 2020 06:00 )             32.4     02-01    125<L>  |  97<L>  |  19  ----------------------------<  129<H>  3.7   |  20<L>  |  0.98    Ca    7.5<L>      2020 06:00  Phos  2.7     02-  Mg     2.0     02-    TPro  4.2<L>  /  Alb  2.3<L>  /  TBili  0.6  /  DBili  x   /  AST  21  /  ALT  16  /  AlkPhos  98  02-    LIVER FUNCTIONS - ( 2020 06:00 )  Alb: 2.3 g/dL / Pro: 4.2 g/dL / ALK PHOS: 98 u/L / ALT: 16 u/L / AST: 21 u/L / GGT: x           PT/INR - ( 2020 02:02 )   PT: 14.0 SEC;   INR: 1.22          PTT - ( 2020 02:02 )  PTT:26.4 SEC  Urinalysis Basic - ( 2020 06:50 )    Color: YELLOW / Appearance: CLEAR / S.017 / pH: 5.5  Gluc: NEGATIVE / Ketone: NEGATIVE  / Bili: NEGATIVE / Urobili: NORMAL   Blood: NEGATIVE / Protein: 10 / Nitrite: NEGATIVE   Leuk Esterase: NEGATIVE / RBC: x / WBC x   Sq Epi: x / Non Sq Epi: x / Bacteria: x          Imaging:
PROGRESS NOTE:   Christi Ferrera, MS4      Patient is a 29y old  Male who presents with a chief complaint of Symptomatic anemia (2020 07:48). Pt says he just wants to get this over with, figure what's going on, and leave. Family at bedside.      SUBJECTIVE / OVERNIGHT EVENTS: The patient was seen and examined at bedside.     REVIEW OF SYSTEMS:    CONSTITUTIONAL: +Fatigue, +fevers  EYES/ENT: No visual changes;  No vertigo or throat pain   NECK: No pain or stiffness  RESPIRATORY: +SOB, No cough, wheezing, hemoptysis  CARDIOVASCULAR: No chest pain or palpitations  GASTROINTESTINAL: +Melena. No abdominal or epigastric pain. No nausea, vomiting, or hematemesis; No diarrhea or constipation.  GENITOURINARY: No dysuria, frequency or hematuria  NEUROLOGICAL: +Weakness No numbness  SKIN: No itching, rashes    MEDICATIONS  (STANDING):  digoxin     Tablet 0.125 milliGRAM(s) Oral daily  furosemide    Tablet 20 milliGRAM(s) Oral at bedtime  furosemide    Tablet 40 milliGRAM(s) Oral daily  pantoprazole Infusion 8 mG/Hr (10 mL/Hr) IV Continuous <Continuous>    MEDICATIONS  (PRN):      CAPILLARY BLOOD GLUCOSE        I&O's Summary      PHYSICAL EXAM:  Vital Signs Last 24 Hrs  T(C): 36.6 (2020 07:53), Max: 38.6 (2020 02:48)  T(F): 97.8 (2020 07:53), Max: 101.5 (2020 02:48)  HR: 86 (2020 07:53) (78 - 115)  BP: 115/95 (2020 07:53) (97/47 - 127/41)  BP(mean): 57 (2020 03:20) (53 - 57)  RR: 18 (2020 07:53) (17 - 24)  SpO2: 100% (2020 07:53) (95% - 100%)    TELEMETRY:    CONSTITUTIONAL: NAD, well-developed, pale appearing  HEENT: Conjunctival pallor, EOMI  RESPIRATORY: Normal respiratory effort; lungs are clear to auscultation bilaterally  CARDIOVASCULAR: Regular rate and rhythm, normal S1 and S2, no murmur/rub/gallop; No lower extremity edema  ABDOMEN: Soft, non distended, nontender to palpation, normoactive bowel sounds, no rebound/guarding  MUSCULOSKELETAL: no clubbing or cyanosis of digits  PSYCH: A+O to person, place, and time; irritated affect     LABS:                        6.0    6.14  )-----------( 173      ( 2020 02:02 )             22.2         130<L>  |  98  |  22  ----------------------------<  98  4.5   |  20<L>  |  1.05    Ca    7.7<L>      2020 02:02    TPro  4.5<L>  /  Alb  2.5<L>  /  TBili  0.4  /  DBili  x   /  AST  43<H>  /  ALT  20  /  AlkPhos  106      PT/INR - ( 2020 02:02 )   PT: 14.0 SEC;   INR: 1.22          PTT - ( 2020 02:02 )  PTT:26.4 SEC      Urinalysis Basic - ( 2020 06:50 )    Color: YELLOW / Appearance: CLEAR / S.017 / pH: 5.5  Gluc: NEGATIVE / Ketone: NEGATIVE  / Bili: NEGATIVE / Urobili: NORMAL   Blood: NEGATIVE / Protein: 10 / Nitrite: NEGATIVE   Leuk Esterase: NEGATIVE / RBC: x / WBC x   Sq Epi: x / Non Sq Epi: x / Bacteria: x

## 2020-02-01 NOTE — PROGRESS NOTE ADULT - ASSESSMENT
28 YO M with MHx of Hypoplastic Left heart s/p fontan procedure on aspirin c/b FALD, and anemia of unknown source who presents with shortness of breath, fatigue,  and light headedness since yesterday.
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     IMPRESSION  - Acute on chronic anemia with melena: Hb 6 (unknown baselines, patient does take iron supplements at home, but there is change in BMs) Ddx: could be due to UGI bleeding from esophagitis, PUD, erosive gastritis, dieulafoy lesion, angioectasia, stomach cancer.  Labs consistant with KORIN.  - Fever of 101 in the ER (CXR-, UA-, RVP-), pending cultures, unclear source     RECOMMENDATION  - trend CBC, CMP, INR, transfuse as needed  - please f/u workup: iron panel, vitamin b12, folate, reticulocyte count  - start pantoprazole with bolus 80mg IV followed by infusion at 8mg/hr  - plan for upper endoscopy, when patient is medically optimised possibly Monday  - will need cardiology clearance for the procedure   - clear liquid diet for now   - supportive care as per primary team

## 2020-02-01 NOTE — CHART NOTE - NSCHARTNOTEFT_GEN_A_CORE
Medical attending Note    Patient left AMA this morning prior to being evaluated by me. Yesterday I had discussed all risks of leaving without getting EGD. These risks including the risks of death was discussed with patient and his mother. Per my evaluation yesterday, patient has capacity to make his own medical decisions.

## 2020-02-01 NOTE — DISCHARGE NOTE NURSING/CASE MANAGEMENT/SOCIAL WORK - PATIENT PORTAL LINK FT
You can access the FollowMyHealth Patient Portal offered by [facility] by registering at the following website: [email or web address]. By joining [employer]’s FollowMyHealth portal, you will also be able to view your health information using other applications (apps) compatible with our system.

## 2020-02-01 NOTE — CHART NOTE - NSCHARTNOTEFT_GEN_A_CORE
After discussing with the patient regarding his options; he opted to leave AMA at this time now that his hemoglobin is 10. See previous note for further details.    Luis Blount  EM/IM  PGY-2

## 2020-02-01 NOTE — PROGRESS NOTE ADULT - ATTENDING COMMENTS
Patient AMA prior to rounds
Patient seen and examined. Agree with above note by resident.    #Symptomatic anemia - likely blood loss given patient reports dark stools. On presentation hgb of 6.  At this time patient is s/p 1 unit PRBC. Per adult congenital cardiologist, hgb of 10 is ideal for procedure. Will transfuse another 2 units and recheck cbc. EGD planned for monday if patient stays inpt. Patient refusing to stay over the weekend if procedure isn't being done today. Risks of leaving without knowing the source of bleeding discussed with patient and his mother at bedside. Patient understands risks and still would like to leave after transfusion. Patient will need to leave AMA if he leaves prior to EGD.    Case discussed with cardiologist and GI teams.     All questions and concerns addressed with patient and his mother at bedside.

## 2020-02-02 LAB — BACTERIA UR CULT: SIGNIFICANT CHANGE UP

## 2020-02-03 ENCOUNTER — INPATIENT (INPATIENT)
Facility: HOSPITAL | Age: 30
LOS: 2 days | Discharge: HOME | End: 2020-02-06
Attending: HOSPITALIST | Admitting: HOSPITALIST
Payer: MEDICARE

## 2020-02-03 VITALS
HEART RATE: 84 BPM | RESPIRATION RATE: 16 BRPM | SYSTOLIC BLOOD PRESSURE: 96 MMHG | OXYGEN SATURATION: 94 % | HEIGHT: 66 IN | DIASTOLIC BLOOD PRESSURE: 54 MMHG

## 2020-02-03 DIAGNOSIS — Q23.4 HYPOPLASTIC LEFT HEART SYNDROME: ICD-10-CM

## 2020-02-03 DIAGNOSIS — K76.9 LIVER DISEASE, UNSPECIFIED: ICD-10-CM

## 2020-02-03 DIAGNOSIS — Z98.890 OTHER SPECIFIED POSTPROCEDURAL STATES: Chronic | ICD-10-CM

## 2020-02-03 DIAGNOSIS — Z02.9 ENCOUNTER FOR ADMINISTRATIVE EXAMINATIONS, UNSPECIFIED: ICD-10-CM

## 2020-02-03 DIAGNOSIS — D64.9 ANEMIA, UNSPECIFIED: ICD-10-CM

## 2020-02-03 DIAGNOSIS — I50.9 HEART FAILURE, UNSPECIFIED: ICD-10-CM

## 2020-02-03 DIAGNOSIS — R60.0 LOCALIZED EDEMA: ICD-10-CM

## 2020-02-03 DIAGNOSIS — Z29.9 ENCOUNTER FOR PROPHYLACTIC MEASURES, UNSPECIFIED: ICD-10-CM

## 2020-02-03 LAB
ALBUMIN SERPL ELPH-MCNC: 2.6 G/DL — LOW (ref 3.3–5)
ALP SERPL-CCNC: 116 U/L — SIGNIFICANT CHANGE UP (ref 40–120)
ALT FLD-CCNC: 17 U/L — SIGNIFICANT CHANGE UP (ref 4–41)
ANION GAP SERPL CALC-SCNC: 8 MMO/L — SIGNIFICANT CHANGE UP (ref 7–14)
APTT BLD: 27.5 SEC — SIGNIFICANT CHANGE UP (ref 27.5–36.3)
AST SERPL-CCNC: 18 U/L — SIGNIFICANT CHANGE UP (ref 4–40)
BASOPHILS # BLD AUTO: 0.06 K/UL — SIGNIFICANT CHANGE UP (ref 0–0.2)
BASOPHILS NFR BLD AUTO: 0.7 % — SIGNIFICANT CHANGE UP (ref 0–2)
BILIRUB SERPL-MCNC: 0.4 MG/DL — SIGNIFICANT CHANGE UP (ref 0.2–1.2)
BLD GP AB SCN SERPL QL: NEGATIVE — SIGNIFICANT CHANGE UP
BUN SERPL-MCNC: 19 MG/DL — SIGNIFICANT CHANGE UP (ref 7–23)
CALCIUM SERPL-MCNC: 8.1 MG/DL — LOW (ref 8.4–10.5)
CHLORIDE SERPL-SCNC: 99 MMOL/L — SIGNIFICANT CHANGE UP (ref 98–107)
CO2 SERPL-SCNC: 24 MMOL/L — SIGNIFICANT CHANGE UP (ref 22–31)
CREAT SERPL-MCNC: 0.95 MG/DL — SIGNIFICANT CHANGE UP (ref 0.5–1.3)
EOSINOPHIL # BLD AUTO: 0.2 K/UL — SIGNIFICANT CHANGE UP (ref 0–0.5)
EOSINOPHIL NFR BLD AUTO: 2.5 % — SIGNIFICANT CHANGE UP (ref 0–6)
GLUCOSE SERPL-MCNC: 92 MG/DL — SIGNIFICANT CHANGE UP (ref 70–99)
HCT VFR BLD CALC: 31.5 % — LOW (ref 39–50)
HGB BLD-MCNC: 9.8 G/DL — LOW (ref 13–17)
IMM GRANULOCYTES NFR BLD AUTO: 1 % — SIGNIFICANT CHANGE UP (ref 0–1.5)
INR BLD: 1.03 — SIGNIFICANT CHANGE UP (ref 0.88–1.17)
LYMPHOCYTES # BLD AUTO: 0.61 K/UL — LOW (ref 1–3.3)
LYMPHOCYTES # BLD AUTO: 7.5 % — LOW (ref 13–44)
MCHC RBC-ENTMCNC: 26.6 PG — LOW (ref 27–34)
MCHC RBC-ENTMCNC: 31.1 % — LOW (ref 32–36)
MCV RBC AUTO: 85.6 FL — SIGNIFICANT CHANGE UP (ref 80–100)
MONOCYTES # BLD AUTO: 0.77 K/UL — SIGNIFICANT CHANGE UP (ref 0–0.9)
MONOCYTES NFR BLD AUTO: 9.5 % — SIGNIFICANT CHANGE UP (ref 2–14)
NEUTROPHILS # BLD AUTO: 6.38 K/UL — SIGNIFICANT CHANGE UP (ref 1.8–7.4)
NEUTROPHILS NFR BLD AUTO: 78.8 % — HIGH (ref 43–77)
NRBC # FLD: 0 K/UL — SIGNIFICANT CHANGE UP (ref 0–0)
NT-PROBNP SERPL-SCNC: 674 PG/ML — SIGNIFICANT CHANGE UP
PLATELET # BLD AUTO: 192 K/UL — SIGNIFICANT CHANGE UP (ref 150–400)
PMV BLD: 11.9 FL — SIGNIFICANT CHANGE UP (ref 7–13)
POTASSIUM SERPL-MCNC: 4.2 MMOL/L — SIGNIFICANT CHANGE UP (ref 3.5–5.3)
POTASSIUM SERPL-SCNC: 4.2 MMOL/L — SIGNIFICANT CHANGE UP (ref 3.5–5.3)
PROT SERPL-MCNC: 4.7 G/DL — LOW (ref 6–8.3)
PROTHROM AB SERPL-ACNC: 11.7 SEC — SIGNIFICANT CHANGE UP (ref 9.8–13.1)
RBC # BLD: 3.68 M/UL — LOW (ref 4.2–5.8)
RBC # FLD: 15.7 % — HIGH (ref 10.3–14.5)
RH IG SCN BLD-IMP: POSITIVE — SIGNIFICANT CHANGE UP
SODIUM SERPL-SCNC: 131 MMOL/L — LOW (ref 135–145)
WBC # BLD: 8.1 K/UL — SIGNIFICANT CHANGE UP (ref 3.8–10.5)
WBC # FLD AUTO: 8.1 K/UL — SIGNIFICANT CHANGE UP (ref 3.8–10.5)

## 2020-02-03 PROCEDURE — 71045 X-RAY EXAM CHEST 1 VIEW: CPT | Mod: 26

## 2020-02-03 PROCEDURE — 93971 EXTREMITY STUDY: CPT | Mod: 26,LT

## 2020-02-03 RX ORDER — FUROSEMIDE 40 MG
40 TABLET ORAL ONCE
Refills: 0 | Status: COMPLETED | OUTPATIENT
Start: 2020-02-03 | End: 2020-02-03

## 2020-02-03 RX ORDER — DIGOXIN 250 MCG
0.12 TABLET ORAL DAILY
Refills: 0 | Status: DISCONTINUED | OUTPATIENT
Start: 2020-02-03 | End: 2020-02-06

## 2020-02-03 RX ORDER — SPIRONOLACTONE 25 MG/1
25 TABLET, FILM COATED ORAL DAILY
Refills: 0 | Status: DISCONTINUED | OUTPATIENT
Start: 2020-02-03 | End: 2020-02-06

## 2020-02-03 RX ADMIN — Medication 40 MILLIGRAM(S): at 19:33

## 2020-02-03 NOTE — H&P ADULT - ASSESSMENT
30 YO M with MHx of Hypoplastic Left heart s/p fontan procedure on aspirin c/b FALD, and anemia of unknown source who presents with worsening left lower extremity swelling in the setting of fluid overload.

## 2020-02-03 NOTE — ED PROVIDER NOTE - OBJECTIVE STATEMENT
28 YO M with MHx of Hypoplastic Left heart s/p fontan procedure on aspirin c/b FALD, and anemia of unknown source who presents with bilateral lower extremity swelling, L>R, and painless abdominal distension a few days after leaving [facility] s/p transfusion several units of PRBCs between 1/25 and 2/1 in light of gi bleed that patient has had since December (black, tarry stools which have improved since pt has stopped his blood thinners). Pt was being considered for EGD by GI but decided to AMA Saturday 2/1 because he didn't want to wait around until Monday 2/3 - wanted to go home to watch the Private Practice. Denies n/v, f/c, cough, cp/sob. Pt says his BMs are no longer nearly as dark.

## 2020-02-03 NOTE — H&P ADULT - NSHPLABSRESULTS_GEN_ALL_CORE
LABS:                          9.8    8.10  )-----------( 192      ( 03 Feb 2020 17:05 )             31.5       02-03    131<L>  |  99  |  19  ----------------------------<  92  4.2   |  24  |  0.95    Ca    8.1<L>      03 Feb 2020 17:05    TPro  4.7<L>  /  Alb  2.6<L>  /  TBili  0.4  /  DBili  x   /  AST  18  /  ALT  17  /  AlkPhos  116  02-03       LIVER FUNCTIONS - ( 03 Feb 2020 17:05 )  Alb: 2.6 g/dL / Pro: 4.7 g/dL / ALK PHOS: 116 u/L / ALT: 17 u/L / AST: 18 u/L / GGT: x                        PT/INR - ( 03 Feb 2020 17:05 )   PT: 11.7 SEC;   INR: 1.03          PTT - ( 03 Feb 2020 17:05 )  PTT:27.5 SEC    Lactate Trend            CAPILLARY BLOOD GLUCOSE        < from: Xray Chest 1 View- PORTABLE-Urgent (02.03.20 @ 18:00) >    ******PRELIMINARY REPORT******            INTERPRETATION:  clear lungs    < end of copied text >      < from: US Duplex Venous Lower Ext Ltd, Left (02.03.20 @ 20:36) >    IMPRESSION:    1.  No evidence of left lower extremity deep venous thrombosis.    < end of copied text >    EKG:       RADIOLOGY & ADDITIONAL TESTS: Reviewed LABS:                          9.8    8.10  )-----------( 192      ( 03 Feb 2020 17:05 )             31.5       02-03    131<L>  |  99  |  19  ----------------------------<  92  4.2   |  24  |  0.95    Ca    8.1<L>      03 Feb 2020 17:05    TPro  4.7<L>  /  Alb  2.6<L>  /  TBili  0.4  /  DBili  x   /  AST  18  /  ALT  17  /  AlkPhos  116  02-03       LIVER FUNCTIONS - ( 03 Feb 2020 17:05 )  Alb: 2.6 g/dL / Pro: 4.7 g/dL / ALK PHOS: 116 u/L / ALT: 17 u/L / AST: 18 u/L / GGT: x                        PT/INR - ( 03 Feb 2020 17:05 )   PT: 11.7 SEC;   INR: 1.03          PTT - ( 03 Feb 2020 17:05 )  PTT:27.5 SEC    Lactate Trend            CAPILLARY BLOOD GLUCOSE        < from: Xray Chest 1 View- PORTABLE-Urgent (02.03.20 @ 18:00) >    ******PRELIMINARY REPORT******            INTERPRETATION:  clear lungs    < end of copied text >      < from: US Duplex Venous Lower Ext Ltd, Left (02.03.20 @ 20:36) >    IMPRESSION:    1.  No evidence of left lower extremity deep venous thrombosis.    < end of copied text >    EKG: TWI in V2-VG unchanged from previous EKG; NSR;       RADIOLOGY & ADDITIONAL TESTS: Reviewed

## 2020-02-03 NOTE — H&P ADULT - HISTORY OF PRESENT ILLNESS
28 YO M with MHx of Hypoplastic Left heart s/p fontan procedure on aspirin c/b FALD, and anemia of unknown source who presents with shortness of breath, fatigue,  and light headedness since yesterday. Pt states that in August he was hospitalized for generalized edema and anemia. The Edema was contributed to FALD vs Cardiac related which has been much improved since starting diuretic therapy. His anemia was found to be iron deficiency anemia, and he was started on iron pills. Since that time, he has had dark but formed stools. However, one week ago, he started to get pale, short of breath, fatigued, and light headed a/w intermittent abd pain with eating, so he came to the ED where his hgb was 7.2, and he received 1UPRBC. He was advised to stay for further workup, but he left AMA. 28 YO M with MHx of Hypoplastic Left heart s/p fontan procedure on aspirin c/b FALD, and anemia of unknown source who presents with left lower extremity swelling that started a day prior to hospitalization.  Patient states he has baseline edema but it got much worse over the past 48 hours.  The LE edema has been contributed to FALD vs other cardiac causes and has improved since starting diuretic therapy.  Patient is on Lasix 40mg and 20mg prn and yesterday and today he took Lasix 60mg.  Patient also complaining of worsening abdominal distention.  Denies any SOB, chest pain, lightheadedness, dizziniess, palpitations, abdominal pain, nausea, vomiting, melena, bleeding, fevers, chills.      Patient was recently hospitalized from 1/31-02/01 for symptomatic anemia.  Patient's Hgb 6.  He received 4U PRBCs and was started on protonix ggt.  GI was consulted, who recommended cardiology clearance before the endoscopy procedure. His cardiologist, Dr Hernandez, was called and cleared him for a EGD to find the source of the suspected UGIB.  Patient left AMA before receiving EGD.      In the ED, VSS.  Labs significant for Hgb 9.8, .  Patient received IV lasix 40mg.  CXR with no signs of fluid overload.  Patient received L doppler with no DVT. 28 YO M with MHx of Hypoplastic Left heart s/p fontan procedure previously on aspirin c/b FALD, and anemia of unknown source who presents with left lower extremity swelling that started a day prior to hospitalization.  Patient states he has baseline edema but it got much worse over the past 48 hours.  The LE edema has been contributed to FALD vs other cardiac causes and has improved since starting diuretic therapy.  Patient is on Lasix 40mg and 20mg prn and yesterday and today he took Lasix 60mg.  Patient also complaining of worsening abdominal distention.  Denies any SOB, chest pain, lightheadedness, dizziniess, palpitations, abdominal pain, nausea, vomiting, melena, bleeding, fevers, chills.      Patient was recently hospitalized from 1/31-02/01 for symptomatic anemia.  Patient's Hgb 6.  He received 4U PRBCs and was started on protonix ggt.  GI was consulted, who recommended cardiology clearance before the endoscopy procedure. His cardiologist, Dr Hernandez, was called and cleared him for a EGD to find the source of the suspected UGIB.  Patient left AMA before receiving EGD.      In the ED, VSS.  Labs significant for Hgb 9.8, .  Patient received IV lasix 40mg.  CXR with no signs of fluid overload.  Patient received L doppler with no DVT.

## 2020-02-03 NOTE — H&P ADULT - PROBLEM SELECTOR PLAN 1
Patient with worsening lower extremity edema in the setting of fluid overload as patient received a total of 4U PRBCs from 01/31-02/01.  -s/p IV lasix 40mg x 1  -c/w IV lasix 40mg   -strict I/O  -daily weights  - Patient with worsening lower extremity edema in the setting of fluid overload as patient received a total of 4U PRBCs from 01/31-02/01.  -s/p IV lasix 40mg x 1  -c/w IV lasix 40mg BID  -strict I/O  -daily weights  - Patient with worsening lower extremity edema in the setting of fluid overload as patient received a total of 4U PRBCs from 01/31-02/01.  -LE duplex negative for DVT  -s/p IV lasix 40mg x 1  -c/w IV lasix 40mg BID  -strict I/O  -daily weights  -

## 2020-02-03 NOTE — ED ADULT NURSE NOTE - SUICIDE SCREENING QUESTION 3
Med Name sertraline hcl  Dose 100mg  Quantity 45 tablet 30 day supple w/refills    Sig pt takes 1 and 1/2 tablets by mouth daily  Ph[facility] [address]  Ok to leave a detailed message yes   Pharmacy phone is [phone]  Phone is [phone]   No

## 2020-02-03 NOTE — ED PROVIDER NOTE - ATTENDING CONTRIBUTION TO CARE
s/p mario, FALD, recent admission for GIB s/p 4 prbc and left prior to egd. presents with bilateral lower swelling L>R and abdominal swelling. no cp, dizziness, shortness of breath. took 80mg lasix po yesterday and 60mg po this morning. suspect volume overload. congenital cardiology on board. labs, iv lasic, us to assess left leg for dvt admit

## 2020-02-03 NOTE — H&P ADULT - PROBLEM SELECTOR PLAN 3
Patient hospitalized 1/31-02/01 for Hgb 6 and received 4U PRBCs and was on protonix ggt; pt left AMA prior to receiving EGD; Patient received cardiology clearance for EGD  -GI emailed  -active T&S  -CLD and then NPO after 12am  -iron deficiency anemia also in addition to UGIB: ferritin 14; total iron 26. Patient hospitalized 1/31-02/01 for Hgb 6 and received 4U PRBCs and was on protonix ggt; pt left AMA prior to receiving EGD; Patient received cardiology clearance for EGD  -GI emailed  -active T&S  -CLD and then NPO after 12am  -c/w IV protonix 40mg BID  -iron deficiency anemia also in addition to UGIB: ferritin 14; total iron 26.

## 2020-02-03 NOTE — ED ADULT NURSE REASSESSMENT NOTE - NSIMPLEMENTINTERV_GEN_ALL_ED
Implemented All Universal Safety Interventions:  [place] to call system. Call bell, personal items and telephone within reach. Instruct patient to call for assistance. Room bathroom lighting operational. Non-slip footwear when patient is off stretcher. Physically safe environment: no spills, clutter or unnecessary equipment. Stretcher in lowest position, wheels locked, appropriate side rails in place.

## 2020-02-03 NOTE — ED PROVIDER NOTE - CLINICAL SUMMARY MEDICAL DECISION MAKING FREE TEXT BOX
29M hx hypoplastic L heart c/b FALD s/p fontans p/w lower extremity and abdominal swelling s/p multiple PRBC transfusions for GI bleed. Pt pale appearing. Lungs clear. Likely admission for GI re-consult and evaluation for EGD cauterization so that pt may resume AC regimen.

## 2020-02-03 NOTE — ED ADULT TRIAGE NOTE - CHIEF COMPLAINT QUOTE
Pt. c/o b/l LE edema and abdominal distention starting yesterday. States he was seen last week for anemia, given 4units RPBC. Denies sob, cp, dizziness. SpO2 94 in triage which is normal for him. Respirations unlabored. Denies n/v/d or fevers. h/o hypoplastic left heart syndrome, FALD

## 2020-02-03 NOTE — ED ADULT NURSE NOTE - OBJECTIVE STATEMENT
patient alert ox3 came in c/o swelling to lower extremities and more to left ankle and foot with pain. h/o blood transfusions and went home on Saturday and returned today with pain and swelling with  some what dark stools. denies CP/SOB. breathing even and unlabored. connected to CM shows NSR. NAD. skin warm and dry, pale . labs done as ordered. awaiting results and re eval.

## 2020-02-03 NOTE — H&P ADULT - NSHPREVIEWOFSYSTEMS_GEN_ALL_CORE
REVIEW OF SYSTEMS:    CONSTITUTIONAL: No weakness, fevers or chills  EYES/ENT: No visual changes;  No vertigo or throat pain   NECK: No pain or stiffness  RESPIRATORY: No cough, wheezing, hemoptysis; No shortness of breath  CARDIOVASCULAR: No chest pain or palpitations  GASTROINTESTINAL: No abdominal or epigastric pain. No nausea, vomiting, or hematemesis; No diarrhea or constipation. No melena or hematochezia.  GENITOURINARY: No dysuria, frequency or hematuria  NEUROLOGICAL: No numbness or weakness  SKIN: No itching, rashes; + swelling REVIEW OF SYSTEMS:    CONSTITUTIONAL: No weakness, fevers or chills  EYES/ENT: No visual changes;  No vertigo or throat pain   NECK: No pain or stiffness  RESPIRATORY: No cough, wheezing, hemoptysis; No shortness of breath  CARDIOVASCULAR: No chest pain or palpitations  GASTROINTESTINAL: +abdominal swelling No abdominal or epigastric pain. No nausea, vomiting, or hematemesis; No diarrhea or constipation. No melena or hematochezia.  GENITOURINARY: No dysuria, frequency or hematuria  NEUROLOGICAL: No numbness or weakness  SKIN: No itching, rashes; + swelling

## 2020-02-03 NOTE — H&P ADULT - NSHPSOURCEINFORD_GEN_ALL_CORE
History was provided by the motherand patient was brought in for Well Child    Chief Complaint   Patient presents with    Well Child         History of Present Illness:  HPI   Deny Cota is an 18 m.o. male with no significant PMH who presents today for 18 mo. [facility].  Patient is doing well overall with no acute complaints.      Development:  Liquids:all, milk and juice  Solids:all foods; not picky    Dental:few teeth  Elimination:loose stools; teething  Sleep:al night  Behavior:wnl    Development/PDQ-II:wnl  M-CHAT:wnl; all below wnl  Helps in the house  Speaks 6-10 words  Points to 1 body part  Runs  Walks up steps  Uses spoon    Review of Systems   GEN: denies any fever, chills, weight loss, weight gain, or fatigue  HEENT: denies any itching, burning, vision changes, ear drainage, rhinorrhea, congestion, neck stiffness, or sore throat  CV: denies any chest pain, palpitations, dyspnea, or edema  RESP: denies any SOB, increased WOB, wheezing, or cough  GI: denies any nausea, vomiting, appetite change, diarrhea, constipation, or abdominal pain  : denies any discharge, dysuria, or hematuria  MSK: denies any muscle weakness, muscle pain, stiffness, or swelling  Neuro: denies any headache, dizziness, weakness, or numbness  Skin: denies any rash, itching, or sores    Past Medical History:   Diagnosis Date    Eczema     Strabismus     Exotropia OU       Family History   Problem Relation Age of Onset    Hyperlipidemia Father     Strabismus Mother     No Known Problems Sister     Strabismus Maternal Grandmother     Hypertension Maternal Grandfather     Cancer Maternal Grandfather     No Known Problems Paternal Grandmother     Macular degeneration Paternal Grandfather     Hypertension Paternal Grandfather     No Known Problems Brother     No Known Problems Maternal Aunt     No Known Problems Maternal Uncle     No Known Problems Paternal Aunt     No Known Problems Paternal Uncle     Amblyopia Neg Hx   Patient    Blindness Neg Hx     Cataracts Neg Hx     Glaucoma Neg Hx     Retinal detachment Neg Hx     Stroke Neg Hx        Social History     Social History    Marital status: Single     Spouse name: N/A    Number of children: N/A    Years of education: N/A     Social History Main Topics    Smoking status: Never Smoker    Smokeless tobacco: None    Alcohol use No    Drug use: None    Sexual activity: Not Asked     Other Topics Concern    None     Social History Narrative    Lives with both biological parents.  1 dog.  1 bearded dragon.  1 sister.  No smokers.  Both parents work.         Review of patient's allergies indicates:  No Known Allergies    Current Outpatient Prescriptions on File Prior to Visit   Medication Sig Dispense Refill    albuterol (PROVENTIL) 2.5 mg /3 mL (0.083 %) nebulizer solution Take 3 mLs (2.5 mg total) by nebulization every 6 (six) hours as needed for Wheezing. 72 each 6    budesonide (PULMICORT) 0.5 mg/2 mL nebulizer solution Take 2 mLs (0.5 mg total) by nebulization once daily. 30 mL 2    cefPROZIL (CEFZIL) 250 mg/5 mL suspension Take 4 mLs (200 mg total) by mouth 2 (two) times daily. 100 mL 0    cetirizine (ZYRTEC) 1 mg/mL syrup Take 2.5 mLs (2.5 mg total) by mouth once daily. 120 mL 2    desonide (DESOWEN) 0.05 % cream APPLY TOPICALLY TO AFFECTED AREA(S) TWICE DAILY 30 g 0    mometasone (NASONEX) 50 mcg/actuation nasal spray 2 sprays by Nasal route once daily. 17 g 0    nystatin (MYCOSTATIN) cream Apply topically 3 (three) times daily. 30 g 0     No current facility-administered medications on file prior to visit.        Vitals:    04/18/17 0812   Resp: 28   Temp: 98.2 °F (36.8 °C)       Objective:     Physical Exam   Constitutional: WD, WN, NAD, active and playful   HEENT: atraumatic EOMI, PERRL, neck supple, TM pearly gray bilaterally with no fluid or drainage, no congestion or rhinorrhea, no pharyngeal edema  LYMPH: no cervical or axillary lymphadenopathy  CV: RRR,  normal s1 and s2, no palpitations, radial pulses 2+, no thrills  RESP: CTAB, no increased WOB, no respiratory distress, no wheeze, rales or rhonchi  GI: soft, NT, ND, + BS in all 4 quadrants, no guarding or rebound tenderness  : normal genitalia  Musculoskeletal: Normal range of motion, no joint deformities, normal spine  Neuro: DTR 5+, alert and oriented, no muscle weakness  Skin: Skin is warm. Capillary refill takes less than 3 seconds. No rash noted. No pallor.   Nursing note and vitals reviewed.         Assessment:        1. Well child check         Plan:       Well baby exam, over 28 days old  -     Hepatitis A Vaccine (Pediatric/Adolescent) (2 Dose) (IM)            1) WCC: Doing well overall.  Will obtain above labs and immunizations.  RTC at 2 y./o for next WCC or sooner if needed.    Answers for HPI/ROS submitted by the patient on 4/18/2017   activity change: No  appetite change : No  fever: No  congestion: No  sore throat: No  eye discharge: No  eye redness: No  cough: No  wheezing: No  cyanosis: No  chest pain: No  constipation: No  diarrhea: Yes  vomiting: No  difficulty urinating: No  hematuria: No  rash: Yes  wound: No  behavior problem: No  sleep disturbance: No  headaches: No  syncope: No

## 2020-02-03 NOTE — ED PROVIDER NOTE - CONSTITUTIONAL, MLM
normal... Awake, alert, oriented to person, place, time/situation and in no apparent distress. Appears pale.

## 2020-02-03 NOTE — H&P ADULT - ATTENDING COMMENTS
Pt seen and examined. 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 Pt left AMA before he could have a full workup including EGD. Denies any further episodes of black tarry stool or BRBPR. Has noticed LE edema and abdominal distention that all worsened after receiving 4 units PRBCs. No dyspnea w/ exertion or at rest. No evidence of pulm edema on exam. He states his home PO lasix 60 mg was not helping with the LE edema. Will increase to 40 mg IV BID with careful monitoring of volume status. Pt followed by ped cardiology who cleared him for EGD last admission. Continue with ppi iv bid, npo for GI eval. Rest of care as above.

## 2020-02-03 NOTE — H&P ADULT - NSHPPHYSICALEXAM_GEN_ALL_CORE
Vital Signs Last 24 Hrs  T(C): --  T(F): --  HR: 84 (03 Feb 2020 15:48) (84 - 84)  BP: 118/51 (03 Feb 2020 17:14) (96/54 - 118/51)  BP(mean): --  RR: 20 (03 Feb 2020 17:14) (16 - 20)  SpO2: 98% (03 Feb 2020 17:14) (94% - 98%)    PHYSICAL EXAM:  GENERAL: NAD, well-developed  HEAD:  Atraumatic, Normocephalic  EYES: EOMI, PERRLA, +pale sclera   NECK: Supple,   CHEST/LUNG: Clear to auscultation bilaterally; No wheezes, rales, or ronchi; vertical surgical scar   HEART: Regular rate and rhythm; No murmurs, rubs, or gallops  ABDOMEN: Soft, Nontender, Nondistended; Bowel sounds present; no fluid wave  EXTREMITIES:  2+ Peripheral Pulses, No clubbing, cyanosis; + pitting edema up to knees L > R   PSYCH: AAOx3  NEUROLOGY: non-focal  SKIN: No rashes or lesions Vital Signs Last 24 Hrs  T(C): --  T(F): --  HR: 84 (03 Feb 2020 15:48) (84 - 84)  BP: 118/51 (03 Feb 2020 17:14) (96/54 - 118/51)  BP(mean): --  RR: 20 (03 Feb 2020 17:14) (16 - 20)  SpO2: 98% (03 Feb 2020 17:14) (94% - 98%)    PHYSICAL EXAM:  GENERAL: NAD, well-developed  HEAD:  Atraumatic, Normocephalic  EYES: EOMI, PERRLA, +pale sclera   NECK: Supple,   CHEST/LUNG: Clear to auscultation bilaterally; No wheezes, rales, or ronchi; vertical surgical scar   HEART: Regular rate and rhythm; No murmurs, rubs, or gallops  ABDOMEN: Soft, Nontender, distended; Bowel sounds present; no fluid wave  EXTREMITIES:  2+ Peripheral Pulses, No clubbing, cyanosis; + pitting edema up to knees L > R   PSYCH: AAOx3  NEUROLOGY: non-focal  SKIN: No rashes or lesions

## 2020-02-03 NOTE — H&P ADULT - PROBLEM SELECTOR PLAN 2
- s/p fontan procedure   -c/w lasix and spironolactone  -c/w  digoxin  -hold lisinopril and aspirin. - s/p fontan procedure   -c/w lasix and spironolactone  -c/w  digoxin  -c/w lisinopril 2.5mg daily with hold parameters  -hold aspirin

## 2020-02-03 NOTE — ED ADULT NURSE REASSESSMENT NOTE - NS ED NURSE REASSESS COMMENT FT1
pt provided with clear liquids diet prior to NPO at midnight. pt appreciative and tolerated well.
pt returned from US, pt now admitted. plan of care explained. mom at bedside. pt appears in no distress at this time.
report received from RN Ethan, pt A&Ox4 ambulatory in room mom at bedside. pt appears comfortable, respirations even and non labored. pt enroute to US.

## 2020-02-03 NOTE — H&P ADULT - NSICDXFAMILYHX_GEN_ALL_CORE_FT
FAMILY HISTORY:  Family history of neoplasm of uncertain behavior of connective and other soft tissue

## 2020-02-04 ENCOUNTER — RESULT REVIEW (OUTPATIENT)
Age: 30
End: 2020-02-04

## 2020-02-04 LAB
ALBUMIN SERPL ELPH-MCNC: 2.2 G/DL — LOW (ref 3.3–5)
ALP SERPL-CCNC: 103 U/L — SIGNIFICANT CHANGE UP (ref 40–120)
ALT FLD-CCNC: 14 U/L — SIGNIFICANT CHANGE UP (ref 4–41)
ANION GAP SERPL CALC-SCNC: 9 MMO/L — SIGNIFICANT CHANGE UP (ref 7–14)
APTT BLD: 26.5 SEC — LOW (ref 27.5–36.3)
AST SERPL-CCNC: 16 U/L — SIGNIFICANT CHANGE UP (ref 4–40)
BILIRUB SERPL-MCNC: 0.4 MG/DL — SIGNIFICANT CHANGE UP (ref 0.2–1.2)
BUN SERPL-MCNC: 22 MG/DL — SIGNIFICANT CHANGE UP (ref 7–23)
CALCIUM SERPL-MCNC: 7.7 MG/DL — LOW (ref 8.4–10.5)
CHLORIDE SERPL-SCNC: 100 MMOL/L — SIGNIFICANT CHANGE UP (ref 98–107)
CO2 SERPL-SCNC: 23 MMOL/L — SIGNIFICANT CHANGE UP (ref 22–31)
CREAT SERPL-MCNC: 0.93 MG/DL — SIGNIFICANT CHANGE UP (ref 0.5–1.3)
GLUCOSE SERPL-MCNC: 89 MG/DL — SIGNIFICANT CHANGE UP (ref 70–99)
HCT VFR BLD CALC: 27.7 % — LOW (ref 39–50)
HGB BLD-MCNC: 8.6 G/DL — LOW (ref 13–17)
INR BLD: 1.03 — SIGNIFICANT CHANGE UP (ref 0.88–1.17)
MAGNESIUM SERPL-MCNC: 1.9 MG/DL — SIGNIFICANT CHANGE UP (ref 1.6–2.6)
MCHC RBC-ENTMCNC: 26.4 PG — LOW (ref 27–34)
MCHC RBC-ENTMCNC: 31 % — LOW (ref 32–36)
MCV RBC AUTO: 85 FL — SIGNIFICANT CHANGE UP (ref 80–100)
NRBC # FLD: 0.02 K/UL — SIGNIFICANT CHANGE UP (ref 0–0)
PHOSPHATE SERPL-MCNC: 4.4 MG/DL — SIGNIFICANT CHANGE UP (ref 2.5–4.5)
PLATELET # BLD AUTO: 184 K/UL — SIGNIFICANT CHANGE UP (ref 150–400)
PMV BLD: 11.6 FL — SIGNIFICANT CHANGE UP (ref 7–13)
POTASSIUM SERPL-MCNC: 3.7 MMOL/L — SIGNIFICANT CHANGE UP (ref 3.5–5.3)
POTASSIUM SERPL-SCNC: 3.7 MMOL/L — SIGNIFICANT CHANGE UP (ref 3.5–5.3)
PROT SERPL-MCNC: 4.1 G/DL — LOW (ref 6–8.3)
PROTHROM AB SERPL-ACNC: 11.7 SEC — SIGNIFICANT CHANGE UP (ref 9.8–13.1)
RBC # BLD: 3.26 M/UL — LOW (ref 4.2–5.8)
RBC # FLD: 15.5 % — HIGH (ref 10.3–14.5)
SODIUM SERPL-SCNC: 132 MMOL/L — LOW (ref 135–145)
WBC # BLD: 6.62 K/UL — SIGNIFICANT CHANGE UP (ref 3.8–10.5)
WBC # FLD AUTO: 6.62 K/UL — SIGNIFICANT CHANGE UP (ref 3.8–10.5)

## 2020-02-04 PROCEDURE — 99223 1ST HOSP IP/OBS HIGH 75: CPT | Mod: GC

## 2020-02-04 PROCEDURE — 43239 EGD BIOPSY SINGLE/MULTIPLE: CPT | Mod: GC

## 2020-02-04 PROCEDURE — 12345: CPT | Mod: NC,GC

## 2020-02-04 PROCEDURE — 88312 SPECIAL STAINS GROUP 1: CPT | Mod: 26

## 2020-02-04 PROCEDURE — 88305 TISSUE EXAM BY PATHOLOGIST: CPT | Mod: 26

## 2020-02-04 PROCEDURE — 99231 SBSQ HOSP IP/OBS SF/LOW 25: CPT

## 2020-02-04 RX ORDER — SOD SULF/SODIUM/NAHCO3/KCL/PEG
1000 SOLUTION, RECONSTITUTED, ORAL ORAL
Refills: 0 | Status: COMPLETED | OUTPATIENT
Start: 2020-02-04 | End: 2020-02-04

## 2020-02-04 RX ORDER — INFLUENZA VIRUS VACCINE 15; 15; 15; 15 UG/.5ML; UG/.5ML; UG/.5ML; UG/.5ML
0.5 SUSPENSION INTRAMUSCULAR ONCE
Refills: 0 | Status: DISCONTINUED | OUTPATIENT
Start: 2020-02-04 | End: 2020-02-06

## 2020-02-04 RX ORDER — LISINOPRIL 2.5 MG/1
2.5 TABLET ORAL DAILY
Refills: 0 | Status: DISCONTINUED | OUTPATIENT
Start: 2020-02-04 | End: 2020-02-06

## 2020-02-04 RX ORDER — ACETAMINOPHEN 500 MG
650 TABLET ORAL EVERY 6 HOURS
Refills: 0 | Status: DISCONTINUED | OUTPATIENT
Start: 2020-02-04 | End: 2020-02-06

## 2020-02-04 RX ORDER — FUROSEMIDE 40 MG
40 TABLET ORAL
Refills: 0 | Status: DISCONTINUED | OUTPATIENT
Start: 2020-02-04 | End: 2020-02-06

## 2020-02-04 RX ORDER — PANTOPRAZOLE SODIUM 20 MG/1
40 TABLET, DELAYED RELEASE ORAL EVERY 12 HOURS
Refills: 0 | Status: DISCONTINUED | OUTPATIENT
Start: 2020-02-04 | End: 2020-02-06

## 2020-02-04 RX ADMIN — Medication 650 MILLIGRAM(S): at 02:00

## 2020-02-04 RX ADMIN — PANTOPRAZOLE SODIUM 40 MILLIGRAM(S): 20 TABLET, DELAYED RELEASE ORAL at 19:44

## 2020-02-04 RX ADMIN — SPIRONOLACTONE 25 MILLIGRAM(S): 25 TABLET, FILM COATED ORAL at 05:47

## 2020-02-04 RX ADMIN — Medication 40 MILLIGRAM(S): at 05:48

## 2020-02-04 RX ADMIN — Medication 40 MILLIGRAM(S): at 19:45

## 2020-02-04 RX ADMIN — Medication 1000 MILLILITER(S): at 22:40

## 2020-02-04 RX ADMIN — PANTOPRAZOLE SODIUM 40 MILLIGRAM(S): 20 TABLET, DELAYED RELEASE ORAL at 01:31

## 2020-02-04 RX ADMIN — Medication 650 MILLIGRAM(S): at 01:29

## 2020-02-04 RX ADMIN — Medication 0.12 MILLIGRAM(S): at 05:47

## 2020-02-04 NOTE — PROGRESS NOTE ADULT - PROBLEM SELECTOR PLAN 2
- s/p fontan procedure   -c/w lasix and spironolactone  -c/w  digoxin  -c/w lisinopril 2.5mg daily with hold parameters  -hold aspirin due to possible GIB

## 2020-02-04 NOTE — CONSULT NOTE ADULT - SUBJECTIVE AND OBJECTIVE BOX
Chief Complaint:  Patient is a 29y old  Male who presents with a chief complaint of LE swelling (2020 08:54)      HPI:  29 year old male with Hypoplastic Left heart s/p fontan procedure previously on aspirin c/b FALD, and anemia of unknown source who presents with left lower extremity swelling that started a day prior to hospitalization.  Patient states he has baseline edema but it got much worse over the past 48 hours.  The LE edema has been contributed to FALD vs other cardiac causes and has improved since starting diuretic therapy.  Patient is on Lasix 40mg and 20mg prn and yesterday and today he took Lasix 60mg.  Patient also complaining of worsening abdominal distention.  Denies any SOB, chest pain, lightheadedness, dizziniess, palpitations, abdominal pain, nausea, vomiting, melena, bleeding, fevers, chills.      Patient was recently hospitalized from - for symptomatic anemia.  Patient's Hgb 6.  He received 4U PRBCs and was started on protonix ggt.  GI was consulted, who recommended cardiology clearance before the endoscopy procedure. His cardiologist, Dr Hernandez, was called and cleared him for a EGD to find the source of the suspected UGIB.  Patient left AMA before receiving EGD.      In the ED, VSS.  Labs significant for Hgb 9.8, .  Patient received IV lasix 40mg.  CXR with no signs of fluid overload.  Patient received L doppler with no DVT.     Allergies:  No Known Allergies      Home Medications:  Patient Currently Takes Medications as of 2020 23:16 documented in Structured Notes  · 	pantoprazole 40 mg oral delayed release tablet: Last Dose Taken:  , 1 tab(s) orally 2 times a day   · 	digoxin 125 mcg (0.125 mg) oral tablet: Last Dose Taken:  , 1 tab(s) orally once a day  · 	Lasix 40 mg oral tablet: Last Dose Taken:  , 1 tab(s) orally once a day  · 	Lasix 20 mg oral tablet: Last Dose Taken:  , 1 tab(s) orally once a day (at bedtime)  · 	spironolactone 25 mg oral tablet: Last Dose Taken:  , 1 tab(s) orally once a day  · 	lisinopril 2.5 mg oral tablet: Last Dose Taken:  , 1 tab(s) orally once a day  · 	ferrous sulfate 325 mg (65 mg elemental iron) oral tablet: Last Dose Taken:  , 1 tab(s) orally once a day  · 	Colace 100 mg oral capsule: Last Dose Taken:  , 1 cap(s) orally once a day, As Needed      Hospital Medications:  acetaminophen   Tablet .. 650 milliGRAM(s) Oral every 6 hours PRN  digoxin     Tablet 0.125 milliGRAM(s) Oral daily  furosemide   Injectable 40 milliGRAM(s) IV Push two times a day  influenza   Vaccine 0.5 milliLiter(s) IntraMuscular once  lisinopril 2.5 milliGRAM(s) Oral daily  pantoprazole  Injectable 40 milliGRAM(s) IV Push every 12 hours  spironolactone 25 milliGRAM(s) Oral daily      PMHX/PSHX:  HLHS (hypoplastic left heart syndrome)  S/P Fontan procedure      Family history:  Family history of neoplasm of uncertain behavior of connective and other soft tissue      Social History:     ROS:     General:  No wt loss, fevers, chills, night sweats, fatigue,   Eyes:  Good vision, no reported pain  ENT:  No sore throat, pain, runny nose, dysphagia  CV:  No pain, palpitations, hypo/hypertension  Resp:  No dyspnea, cough, tachypnea, wheezing  GI:  See HPI  :  No pain, bleeding, incontinence, nocturia  Muscle:  No pain, weakness  Neuro:  No weakness, tingling, memory problems  Psych:  No fatigue, insomnia, mood problems, depression  Endocrine:  No polyuria, polydipsia, cold/heat intolerance  Heme:  No petechiae, ecchymosis, easy bruisability  Skin:  No rash, edema      PHYSICAL EXAM:     GENERAL:  Appears stated age, well-groomed, well-nourished, no distress  HEENT:  NC/AT,  conjunctivae clear and pink,  no JVD  CHEST:  Full & symmetric excursion, no increased effort, breath sounds clear  HEART:  Regular rhythm, S1, S2, no murmur/rub/S3/S4, no abdominal bruit, no edema  ABDOMEN:  Soft, non-tender, non-distended, normoactive bowel sounds,  no masses ,  EXTREMITIES:  no cyanosis,clubbing or edema  SKIN:  No rash/erythema/ecchymoses/petechiae/wounds/abscess/warm/dry  NEURO:  Alert, oriented    Vital Signs:  Vital Signs Last 24 Hrs  T(C): 36.4 (2020 05:41), Max: 36.4 (2020 23:50)  T(F): 97.5 (2020 05:41), Max: 97.6 (2020 23:50)  HR: 82 (2020 05:41) (80 - 87)  BP: 106/57 (2020 05:41) (96/54 - 118/51)  BP(mean): --  RR: 18 (2020 05:41) (16 - 20)  SpO2: 99% (2020 05:41) (93% - 99%)  Daily Height in cm: 167.64 (2020 15:48)    Daily Weight in k (2020 06:11)    LABS:                        8.6    6.62  )-----------( 184      ( 2020 04:00 )             27.7     02-04    132<L>  |  100  |  22  ----------------------------<  89  3.7   |  23  |  0.93    Ca    7.7<L>      2020 04:00  Phos  4.4     02-04  Mg     1.9     02-04    TPro  4.1<L>  /  Alb  2.2<L>  /  TBili  0.4  /  DBili  x   /  AST  16  /  ALT  14  /  AlkPhos  103  02-04    LIVER FUNCTIONS - ( 2020 04:00 )  Alb: 2.2 g/dL / Pro: 4.1 g/dL / ALK PHOS: 103 u/L / ALT: 14 u/L / AST: 16 u/L / GGT: x           PT/INR - ( 2020 04:00 )   PT: 11.7 SEC;   INR: 1.03          PTT - ( 2020 04:00 )  PTT:26.5 SEC        Imaging: Chief Complaint:  Patient is a 29y old  Male who presents with a chief complaint of LE swelling (2020 08:54)      HPI:  29 year old male with Hypoplastic Left heart s/p fontan procedure previously on aspirin c/b FALD, and anemia of unknown source who presented to the emergency room with chief complain of left lower extremity swelling that started a day prior to hospitalization.      Patient was recently hospitalized from - for symptomatic anemia and melena.  He received 4U PRBCs and plan was to perform an endoscopy but he left AMA prior to the procedure.     He now has LE edema and some abdominal distention. LE doppler was negative and he feels better after he received lasix IV. He denies any SOB, chest pain, lightheadedness, dizziniess, palpitations, abdominal pain, nausea, vomiting, melena, bleeding, fevers, chills.      GI consulted for downtrending Hb. He has not seen any blood in the stool since he left from the hospital.     Allergies:  No Known Allergies      Home Medications:  Patient Currently Takes Medications as of 2020 23:16 documented in Structured Notes  · 	pantoprazole 40 mg oral delayed release tablet: Last Dose Taken:  , 1 tab(s) orally 2 times a day   · 	digoxin 125 mcg (0.125 mg) oral tablet: Last Dose Taken:  , 1 tab(s) orally once a day  · 	Lasix 40 mg oral tablet: Last Dose Taken:  , 1 tab(s) orally once a day  · 	Lasix 20 mg oral tablet: Last Dose Taken:  , 1 tab(s) orally once a day (at bedtime)  · 	spironolactone 25 mg oral tablet: Last Dose Taken:  , 1 tab(s) orally once a day  · 	lisinopril 2.5 mg oral tablet: Last Dose Taken:  , 1 tab(s) orally once a day  · 	ferrous sulfate 325 mg (65 mg elemental iron) oral tablet: Last Dose Taken:  , 1 tab(s) orally once a day  · 	Colace 100 mg oral capsule: Last Dose Taken:  , 1 cap(s) orally once a day, As Needed      Hospital Medications:  acetaminophen   Tablet .. 650 milliGRAM(s) Oral every 6 hours PRN  digoxin     Tablet 0.125 milliGRAM(s) Oral daily  furosemide   Injectable 40 milliGRAM(s) IV Push two times a day  influenza   Vaccine 0.5 milliLiter(s) IntraMuscular once  lisinopril 2.5 milliGRAM(s) Oral daily  pantoprazole  Injectable 40 milliGRAM(s) IV Push every 12 hours  spironolactone 25 milliGRAM(s) Oral daily      PMHX/PSHX:  HLHS (hypoplastic left heart syndrome)  S/P Fontan procedure      Family history:  Family history of neoplasm of uncertain behavior of connective and other soft tissue      Social History:     ROS:     General:  No wt loss, fevers, chills, night sweats, fatigue,   Eyes:  Good vision, no reported pain  ENT:  No sore throat, pain, runny nose, dysphagia  CV:  No pain, palpitations, hypo/hypertension  Resp:  No dyspnea, cough, tachypnea, wheezing  GI:  See HPI  :  No pain, bleeding, incontinence, nocturia  Muscle:  No pain, weakness  Neuro:  No weakness, tingling, memory problems  Psych:  No fatigue, insomnia, mood problems, depression  Endocrine:  No polyuria, polydipsia, cold/heat intolerance  Heme:  No petechiae, ecchymosis, easy bruisability  Skin:  No rash, edema      PHYSICAL EXAM:     GENERAL:  Appears stated age, well-groomed, well-nourished, no distress  HEENT:  NC/AT,  conjunctivae clear and pink,  no JVD  CHEST:  Full & symmetric excursion, no increased effort, breath sounds clear  HEART:  Regular rhythm, S1, S2, no murmur/rub/S3/S4, no abdominal bruit, no edema  ABDOMEN:  Soft, non-tender, non-distended, normoactive bowel sounds,  no masses ,  EXTREMITIES:  no cyanosis,clubbing or edema  SKIN:  No rash/erythema/ecchymoses/petechiae/wounds/abscess/warm/dry  NEURO:  Alert, oriented    Vital Signs:  Vital Signs Last 24 Hrs  T(C): 36.4 (2020 05:41), Max: 36.4 (2020 23:50)  T(F): 97.5 (2020 05:41), Max: 97.6 (2020 23:50)  HR: 82 (2020 05:41) (80 - 87)  BP: 106/57 (2020 05:41) (96/54 - 118/51)  BP(mean): --  RR: 18 (2020 05:41) (16 - 20)  SpO2: 99% (2020 05:41) (93% - 99%)  Daily Height in cm: 167.64 (2020 15:48)    Daily Weight in k (2020 06:11)    LABS:                        8.6    6.62  )-----------( 184      ( 2020 04:00 )             27.7     02-04    132<L>  |  100  |  22  ----------------------------<  89  3.7   |  23  |  0.93    Ca    7.7<L>      2020 04:00  Phos  4.4     02-04  Mg     1.9     02-04    TPro  4.1<L>  /  Alb  2.2<L>  /  TBili  0.4  /  DBili  x   /  AST  16  /  ALT  14  /  AlkPhos  103  02-04    LIVER FUNCTIONS - ( 2020 04:00 )  Alb: 2.2 g/dL / Pro: 4.1 g/dL / ALK PHOS: 103 u/L / ALT: 14 u/L / AST: 16 u/L / GGT: x           PT/INR - ( 2020 04:00 )   PT: 11.7 SEC;   INR: 1.03          PTT - ( 2020 04:00 )  PTT:26.5 SEC        Imaging:

## 2020-02-04 NOTE — CONSULT NOTE ADULT - ATTENDING COMMENTS
GI consulted for anemia and dark stools. On iron for KORIN.   Complicated cardiac history. Will need cardiac risk assessment prior to procedure/anesthesia.   Tentatively plan for EGD today.

## 2020-02-04 NOTE — PROGRESS NOTE ADULT - PROBLEM SELECTOR PLAN 3
Patient hospitalized 1/31-02/01 for Hgb 6 and received 4U PRBCs and was on protonix ggt; pt left AMA prior to receiving EGD; Patient received cardiology clearance for EGD  -GI emailed. Hgb > 9 is OK per Dr Hernandez (cards). If GI has questions, call Dr Hernandez at [phone]  -active T&S  -CLD and then NPO after 12am  -c/w IV protonix 40mg BID  -iron deficiency anemia also in addition to UGIB: ferritin 14; total iron 26.

## 2020-02-04 NOTE — CONSULT NOTE ADULT - SUBJECTIVE AND OBJECTIVE BOX
This is a brief not on Arie who was recently hospitalized (last week) for GI bleeding and severe anemia.  her underwent transfusion with about 4 units of PRBCs and then signed out AMA.  He returned to the [facility] ED yesterday with lower extremity edema and ascites  He noted the swelling on Sat-Sunday and took some extra oral Lasix with little result.  He was admitted last evening from the ED after receiving an additional dose of intravenous furosemide and a plan to undergo endoscopy today.  He now has a Hgb below 10 and still needs the endoscopy to find out where he is losing blood in the GI tract.    When I saw him briefly this morning, he had diuresed about 1200 cc last night with significant reduction in the lower extremity swelling.  He did have a venous duplex study last evening as his left foot/leg was swollen more than his right.  His electrolytes remian mostly unchanged though his TP/albumin continues to drift lower.    Given the rather urgent need to discover where the blood loss is coming from and his relatively good hemodynamic stability overnight and this morning, I am giving "Cardiology Clearance" for him to undergo the endoscopy under [facility].  He should get another unit of PRBCs with a diuretic intravenously during the procedure, if not before.

## 2020-02-04 NOTE — CONSULT NOTE ADULT - REASON FOR ADMISSION
LE swelling
Peripheral edema  Ascities  Post-Fontan Liver Disease  History of recent melena and anemia  S/P Fontan for Hypoplastic Left Hear Syndrome/Renea

## 2020-02-04 NOTE — PROGRESS NOTE ADULT - PROBLEM SELECTOR PLAN 6
Transitions of Care Status:  1.  Name of PCP: Dr. Russell Aguilar [phone]  2.  PCP Contacted on Admission: [ ] Y    [ x] N    3.  PCP contacted at Discharge: [ ] Y    [ ] N    [ ] N/A  4.  Post-Discharge Appointment Date and Location:  5.  Summary of Handoff given to PCP:

## 2020-02-04 NOTE — CONSULT NOTE ADULT - ASSESSMENT
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     IMPRESSION  - Acute on chronic anemia with melena: Hb 6two days ago s/p transfusion and now it is 8, (unknown baselines, patient does take iron supplements at home, but there is change in BMs) Ddx: could be due to UGI bleeding from esophagitis, PUD, erosive gastritis, dieulafoy lesion, angioectasia, stomach cancer.  Labs consistant with KORIN.    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     IMPRESSION  - Acute on chronic anemia with melena: Hb 6 two days ago s/p transfusion and now it is 8, (unknown baselines, patient does take iron supplements at home, but there is change in BMs) Ddx: could be due to UGI bleeding from esophagitis, PUD, erosive gastritis, dieulafoy lesion, angioectasia, stomach cancer.  Labs consistent with KORIN.    RECOMMENDATION    - trend CBC, CMP, INR, transfuse as needed  - continue pantoprazole 40mg IV Qday  - plan for upper endoscopy today  - please keep NPO  - will need cardiology clearance for the procedure   - supportive care as per primary team      Deb Iglesias, PGY-6  GI fellow  B- [phone]/ [phone]  Please call GI fellow on call after 5pm and on weekends 29 year old male with Hypoplastic Left heart s/p fontan procedure previously on aspirin c/b FALD, and anemia of unknown source who presented to the emergency room with chief complain of left lower extremity swelling that started a day prior to hospitalization.      IMPRESSION  - Acute on chronic anemia with melena: Hb 6 (last week, s/p 4U PRBCs from 01/31-02/01 and now it is 8), unknown baselines, patient does take iron supplements at home  Ddx: could be due to UGI bleeding from esophagitis, PUD, erosive gastritis, dieulafoy lesion, angioectasia, stomach cancer.  Labs consistent with KORIN    RECOMMENDATION    - trend CBC, CMP, INR, transfuse as needed  - continue pantoprazole 40mg IV Qday  - plan for upper endoscopy today  - please keep NPO  - appreciate cardiology clearance for the procedure   - supportive care as per primary team      Deb Iglesias, PGY-6  GI fellow  B- [phone]/ [phone]  Please call GI fellow on call after 5pm and on weekends

## 2020-02-04 NOTE — PATIENT PROFILE ADULT - BRADEN MOISTURE
Thank you for choosing [facility].  Here are today's treatment recommendations.  Please contact me if you have any additional questions.    Ice 20 minutes 3-4 times per day for swelling, pain or after exercise.  Avoid any painful activity or exercise.  Continue aggressive cross-training including non impact exercise water jogging, biking or swimming as tolerated without pain.  Then progress to low impact exercise like walking or elliptical as tolerated without pain.   You can progressively return to running in 2-3 weeks as long as your left lower leg pain completely resolves.  Slowly progress back to running, cross-training with nonweightbearing exercise every other day.   Over the counter pain medications as needed.  Home exercises as given today- glute medius strengthening exercises daily.    You can also take Pilates and yoga classes which work on hip and core strengthening.  It is possible to proceed with physical therapy or left tib/fib MRI if symptoms do not improve.    GLUTE MATRIX    Setup  Begin lying on your side with your bottom knee bent.  Movement  Lift your top leg, extend it up and backward. Complete circles x5 both clockwise/counterclockwise, boxes x5 both directions, lifts x5.  Tip  Make sure to keep your abdominals tight, and do not let your hips rotate forward or backward during the exercise.      Glute Strengthening      Hip Hikes        Setup  Begin standing on a platform, balancing on one leg, with your other foot hanging off the edge.  Movement  Raise one hip to lift your hanging foot off the ground as high as you can, then lower it and repeat.  Tip  Make sure to keep your foot relaxed and use your hip to create the movement. Maintain an upright posture during the exercise.     Glute medius at wall    Setup  Begin in a standing upright position with your side against a wall.  Movement  Lift your foot closest to the wall off the ground with your knee bent, and rest your arm on the wall  for balance. Gently push your bent knee into the wall, then relax and repeat.  Tip  Make sure to keep your back straight during the exercise. Think of brannon the muscles in your buttocks as you push your leg into the wall.         (4) rarely moist

## 2020-02-04 NOTE — SBIRT NOTE ADULT - NSSBIRTUNABLESCR_GEN_A_CORE
Pt denies any issue with alcohol abuse. States marijuana use but feels he has this under control. Refuses SBIRT screen at this time./Patient refused

## 2020-02-04 NOTE — PROGRESS NOTE ADULT - ASSESSMENT
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

## 2020-02-04 NOTE — PROGRESS NOTE ADULT - PROBLEM SELECTOR PLAN 1
Patient with worsening lower extremity edema in the setting of fluid overload as patient received a total of 4U PRBCs from 01/31-02/01.  -LE duplex negative for DVT  -s/p IV lasix 40mg x 1  -c/w IV lasix 40mg BID  -strict I/O  -daily weights  -

## 2020-02-05 ENCOUNTER — APPOINTMENT (OUTPATIENT)
Dept: GASTROENTEROLOGY | Facility: CLINIC | Age: 30
End: 2020-02-05

## 2020-02-05 ENCOUNTER — RESULT REVIEW (OUTPATIENT)
Age: 30
End: 2020-02-05

## 2020-02-05 LAB
ALBUMIN SERPL ELPH-MCNC: 3 G/DL — LOW (ref 3.3–5)
ALP SERPL-CCNC: 160 U/L — HIGH (ref 40–120)
ALT FLD-CCNC: 20 U/L — SIGNIFICANT CHANGE UP (ref 4–41)
ANION GAP SERPL CALC-SCNC: 12 MMO/L — SIGNIFICANT CHANGE UP (ref 7–14)
AST SERPL-CCNC: 25 U/L — SIGNIFICANT CHANGE UP (ref 4–40)
BACTERIA BLD CULT: SIGNIFICANT CHANGE UP
BACTERIA BLD CULT: SIGNIFICANT CHANGE UP
BASOPHILS # BLD AUTO: 0.09 K/UL — SIGNIFICANT CHANGE UP (ref 0–0.2)
BASOPHILS NFR BLD AUTO: 1 % — SIGNIFICANT CHANGE UP (ref 0–2)
BILIRUB SERPL-MCNC: 0.8 MG/DL — SIGNIFICANT CHANGE UP (ref 0.2–1.2)
BUN SERPL-MCNC: 18 MG/DL — SIGNIFICANT CHANGE UP (ref 7–23)
CALCIUM SERPL-MCNC: 8.6 MG/DL — SIGNIFICANT CHANGE UP (ref 8.4–10.5)
CHLORIDE SERPL-SCNC: 101 MMOL/L — SIGNIFICANT CHANGE UP (ref 98–107)
CO2 SERPL-SCNC: 23 MMOL/L — SIGNIFICANT CHANGE UP (ref 22–31)
CREAT SERPL-MCNC: 1.02 MG/DL — SIGNIFICANT CHANGE UP (ref 0.5–1.3)
EOSINOPHIL # BLD AUTO: 0.11 K/UL — SIGNIFICANT CHANGE UP (ref 0–0.5)
EOSINOPHIL NFR BLD AUTO: 1.2 % — SIGNIFICANT CHANGE UP (ref 0–6)
GLUCOSE SERPL-MCNC: 92 MG/DL — SIGNIFICANT CHANGE UP (ref 70–99)
HCT VFR BLD CALC: 36.6 % — LOW (ref 39–50)
HGB BLD-MCNC: 11.1 G/DL — LOW (ref 13–17)
IMM GRANULOCYTES NFR BLD AUTO: 0.9 % — SIGNIFICANT CHANGE UP (ref 0–1.5)
LYMPHOCYTES # BLD AUTO: 0.51 K/UL — LOW (ref 1–3.3)
LYMPHOCYTES # BLD AUTO: 5.6 % — LOW (ref 13–44)
MAGNESIUM SERPL-MCNC: 1.9 MG/DL — SIGNIFICANT CHANGE UP (ref 1.6–2.6)
MCHC RBC-ENTMCNC: 26.1 PG — LOW (ref 27–34)
MCHC RBC-ENTMCNC: 30.3 % — LOW (ref 32–36)
MCV RBC AUTO: 85.9 FL — SIGNIFICANT CHANGE UP (ref 80–100)
MONOCYTES # BLD AUTO: 0.64 K/UL — SIGNIFICANT CHANGE UP (ref 0–0.9)
MONOCYTES NFR BLD AUTO: 7.1 % — SIGNIFICANT CHANGE UP (ref 2–14)
NEUTROPHILS # BLD AUTO: 7.63 K/UL — HIGH (ref 1.8–7.4)
NEUTROPHILS NFR BLD AUTO: 84.2 % — HIGH (ref 43–77)
NRBC # FLD: 0 K/UL — SIGNIFICANT CHANGE UP (ref 0–0)
PHOSPHATE SERPL-MCNC: 4.3 MG/DL — SIGNIFICANT CHANGE UP (ref 2.5–4.5)
PLATELET # BLD AUTO: 247 K/UL — SIGNIFICANT CHANGE UP (ref 150–400)
PMV BLD: 11.1 FL — SIGNIFICANT CHANGE UP (ref 7–13)
POTASSIUM SERPL-MCNC: 3.7 MMOL/L — SIGNIFICANT CHANGE UP (ref 3.5–5.3)
POTASSIUM SERPL-SCNC: 3.7 MMOL/L — SIGNIFICANT CHANGE UP (ref 3.5–5.3)
PROT SERPL-MCNC: 5.3 G/DL — LOW (ref 6–8.3)
RBC # BLD: 4.26 M/UL — SIGNIFICANT CHANGE UP (ref 4.2–5.8)
RBC # FLD: 15.3 % — HIGH (ref 10.3–14.5)
SODIUM SERPL-SCNC: 136 MMOL/L — SIGNIFICANT CHANGE UP (ref 135–145)
WBC # BLD: 9.06 K/UL — SIGNIFICANT CHANGE UP (ref 3.8–10.5)
WBC # FLD AUTO: 9.06 K/UL — SIGNIFICANT CHANGE UP (ref 3.8–10.5)

## 2020-02-05 PROCEDURE — 45385 COLONOSCOPY W/LESION REMOVAL: CPT | Mod: GC

## 2020-02-05 PROCEDURE — 88305 TISSUE EXAM BY PATHOLOGIST: CPT | Mod: 26

## 2020-02-05 PROCEDURE — 99233 SBSQ HOSP IP/OBS HIGH 50: CPT | Mod: GC

## 2020-02-05 PROCEDURE — 44376 SMALL BOWEL ENDOSCOPY: CPT | Mod: GC

## 2020-02-05 RX ADMIN — Medication 40 MILLIGRAM(S): at 17:38

## 2020-02-05 RX ADMIN — LISINOPRIL 2.5 MILLIGRAM(S): 2.5 TABLET ORAL at 05:54

## 2020-02-05 RX ADMIN — SPIRONOLACTONE 25 MILLIGRAM(S): 25 TABLET, FILM COATED ORAL at 05:54

## 2020-02-05 RX ADMIN — PANTOPRAZOLE SODIUM 40 MILLIGRAM(S): 20 TABLET, DELAYED RELEASE ORAL at 17:38

## 2020-02-05 RX ADMIN — PANTOPRAZOLE SODIUM 40 MILLIGRAM(S): 20 TABLET, DELAYED RELEASE ORAL at 05:56

## 2020-02-05 RX ADMIN — Medication 0.12 MILLIGRAM(S): at 05:55

## 2020-02-05 RX ADMIN — Medication 40 MILLIGRAM(S): at 05:55

## 2020-02-05 NOTE — CHART NOTE - NSCHARTNOTEFT_GEN_A_CORE
VIDEO CAPSULE ENDOSCOPY    Risks of video capsule endoscopy explained, including risk of retained capsule, potentially requiring surgery for removal.  Patient understands and agrees to risks.  Capsule swallowed at: 10:30AM    Diet:       NPO now.       Clear liquid diet at: 12:30PM      Regular diet at: 2:30PM      NPO again after midnight    Equipment can be removed at: 10:30PM   GI fellow will retrieve equipment in the morning.  NO MRI UNTIL THE CAPSULE IS IN THE BODY

## 2020-02-05 NOTE — PROGRESS NOTE ADULT - PROBLEM SELECTOR PLAN 6
Transitions of Care Status:  1.  Name of PCP: Dr. Russell Aguilar [phone]  2.  PCP Contacted on Admission: [ ] Y    [ x] N    3.  PCP contacted at Discharge: [ ] Y    [ ] N    [ ] N/A  4.  Post-Discharge Appointment Date and Location:  5.  Summary of Handoff given to PCP:

## 2020-02-05 NOTE — PROGRESS NOTE ADULT - PROBLEM SELECTOR PLAN 1
Patient with worsening lower extremity edema in the setting of fluid overload as patient received a total of 4U PRBCs from 01/31-02/01. On exam, edema appears improved especially on RLE  -LE duplex negative for DVT  -s/p IV lasix 40mg x 1  -c/w IV lasix 40mg BID  -strict I/O  -daily weights  -

## 2020-02-05 NOTE — PROGRESS NOTE ADULT - ASSESSMENT
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

## 2020-02-05 NOTE — PROGRESS NOTE ADULT - SUBJECTIVE AND OBJECTIVE BOX
PROGRESS NOTE:   Christi Ferrera, MS4      Patient is a 29y old  Male who presents with a chief complaint of LE swelling (03 Feb 2020 22:06)      SUBJECTIVE / OVERNIGHT EVENTS: The patient was seen and examined at bedside. He endorses consistently rust-colored BMs overnight with 2 episodes of kenneth blood in his stool. He says his lower extremity swelling has improved and is no longer painful. Pt is annoyed about being NPO and not having eaten but is hopeful that the colonoscopy will yield some result. He also refused telemetry but was last seen in NSR yesterday prior to refusal, per nursing.     REVIEW OF SYSTEMS:    CONSTITUTIONAL: No weakness, fevers or chills  EYES/ENT: No visual changes;  No vertigo or throat pain   NECK: No pain or stiffness  RESPIRATORY: No cough, wheezing, hemoptysis; No shortness of breath  CARDIOVASCULAR: +b/l LE edema. No chest pain or palpitations  GASTROINTESTINAL: +Hematochezia, no melena. No abdominal or epigastric pain. No nausea, vomiting, or hematemesis; No diarrhea or constipation.   GENITOURINARY: No dysuria, frequency or hematuria  NEUROLOGICAL: No numbness or weakness  SKIN: No itching, rashes    MEDICATIONS  (STANDING):  digoxin     Tablet 0.125 milliGRAM(s) Oral daily  furosemide   Injectable 40 milliGRAM(s) IV Push two times a day  influenza   Vaccine 0.5 milliLiter(s) IntraMuscular once  lisinopril 2.5 milliGRAM(s) Oral daily  pantoprazole  Injectable 40 milliGRAM(s) IV Push every 12 hours  spironolactone 25 milliGRAM(s) Oral daily    MEDICATIONS  (PRN):  acetaminophen   Tablet .. 650 milliGRAM(s) Oral every 6 hours PRN Temp greater or equal to 38C (100.4F), Mild Pain (1 - 3), Moderate Pain (4 - 6), Severe Pain (7 - 10)        I&O's Summary    04 Feb 2020 07:01  -  05 Feb 2020 07:00  --------------------------------------------------------  IN: 150 mL / OUT: 2300 mL / NET: -2150 mL        PHYSICAL EXAM:  Vital Signs Last 24 Hrs  T(C): 36.1 (05 Feb 2020 05:49), Max: 36.7 (04 Feb 2020 19:39)  T(F): 97 (05 Feb 2020 05:49), Max: 98 (04 Feb 2020 19:39)  HR: 99 (05 Feb 2020 05:49) (78 - 99)  BP: 126/64 (05 Feb 2020 05:49) (106/48 - 137/51)  RR: 14 (05 Feb 2020 05:49) (14 - 22)  SpO2: 95% (05 Feb 2020 05:49) (94% - 99%)      CONSTITUTIONAL: NAD, well-developed  RESPIRATORY: Normal respiratory effort; lungs are clear to auscultation bilaterally  CARDIOVASCULAR: Regular rate and rhythm, normal S1 and S2, no murmur/rub/gallop; +b/l pitting LE edema (L>R, L: up to mid calf, R up to ankle). Peripheral pulses are 2+ bilaterally  ABDOMEN: Soft, distended, nontender to palpation, normoactive bowel sounds, no rebound/guarding  MUSCLOSKELETAL: no clubbing or cyanosis of digits; no joint swelling or tenderness to palpation  PSYCH: A+O to person, place, and time; affect appropriate    LABS:    02-04    132<L>  |  100  |  22  ----------------------------<  89  3.7   |  23  |  0.93  02-03    131<L>  |  99  |  19  ----------------------------<  92  4.2   |  24  |  0.95    Ca    7.7<L>      04 Feb 2020 04:00  Ca    8.1<L>      03 Feb 2020 17:05  Phos  4.4     02-04  Mg     1.9     02-04    TPro  4.1<L>  /  Alb  2.2<L>  /  TBili  0.4  /  DBili  x   /  AST  16  /  ALT  14  /  AlkPhos  103  02-04  TPro  4.7<L>  /  Alb  2.6<L>  /  TBili  0.4  /  DBili  x   /  AST  18  /  ALT  17  /  AlkPhos  116  02-03    Magnesium, Serum: 1.9 mg/dL (02-04-20 @ 04:00)    Phosphorus Level, Serum: 4.4 mg/dL (02-04-20 @ 04:00)      PT/INR - ( 04 Feb 2020 04:00 )   PT: 11.7 SEC;   INR: 1.03          PTT - ( 04 Feb 2020 04:00 )  PTT:26.5 SEC                                        11.1   9.06  )-----------( 247      ( 05 Feb 2020 07:23 )             36.6                         8.6    6.62  )-----------( 184      ( 04 Feb 2020 04:00 )             27.7                         9.8    8.10  )-----------( 192      ( 03 Feb 2020 17:05 )             31.5       CXR wnl

## 2020-02-05 NOTE — PROGRESS NOTE ADULT - PROBLEM SELECTOR PLAN 3
Patient hospitalized 1/31-02/01 for Hgb 6 and received 4U PRBCs and was on protonix ggt; pt left AMA prior to receiving EGD; Patient received cardiology clearance for EGD, no active bleed seen. Pt endorsed hematochezia overnight.  -CLD and then NPO after 12am for colonoscopy today  -GI consulted and following; Hgb > 9 is OK per Dr Hernandez (cards). If GI has questions, call Dr Hernandez at [phone]  -active T&S  -c/w IV protonix 40mg BID  -iron deficiency anemia also in addition to GIB: ferritin 14; total iron 26.

## 2020-02-06 ENCOUNTER — TRANSCRIPTION ENCOUNTER (OUTPATIENT)
Age: 30
End: 2020-02-06

## 2020-02-06 VITALS
SYSTOLIC BLOOD PRESSURE: 93 MMHG | OXYGEN SATURATION: 95 % | HEART RATE: 97 BPM | RESPIRATION RATE: 17 BRPM | DIASTOLIC BLOOD PRESSURE: 54 MMHG | TEMPERATURE: 97 F

## 2020-02-06 DIAGNOSIS — D64.9 ANEMIA, UNSPECIFIED: ICD-10-CM

## 2020-02-06 DIAGNOSIS — I50.9 HEART FAILURE, UNSPECIFIED: ICD-10-CM

## 2020-02-06 PROCEDURE — 99239 HOSP IP/OBS DSCHRG MGMT >30: CPT

## 2020-02-06 RX ORDER — IRON SUCROSE 20 MG/ML
400 INJECTION, SOLUTION INTRAVENOUS ONCE
Refills: 0 | Status: COMPLETED | OUTPATIENT
Start: 2020-02-06 | End: 2020-02-06

## 2020-02-06 RX ADMIN — Medication 40 MILLIGRAM(S): at 06:41

## 2020-02-06 RX ADMIN — SPIRONOLACTONE 25 MILLIGRAM(S): 25 TABLET, FILM COATED ORAL at 06:41

## 2020-02-06 RX ADMIN — PANTOPRAZOLE SODIUM 40 MILLIGRAM(S): 20 TABLET, DELAYED RELEASE ORAL at 06:41

## 2020-02-06 RX ADMIN — Medication 0.12 MILLIGRAM(S): at 06:41

## 2020-02-06 RX ADMIN — LISINOPRIL 2.5 MILLIGRAM(S): 2.5 TABLET ORAL at 06:41

## 2020-02-06 RX ADMIN — IRON SUCROSE 108 MILLIGRAM(S): 20 INJECTION, SOLUTION INTRAVENOUS at 12:07

## 2020-02-06 NOTE — DISCHARGE NOTE NURSING/CASE MANAGEMENT/SOCIAL WORK - PATIENT PORTAL LINK FT
You can access the FollowMyHealth Patient Portal offered by [facility] by registering at the following website: [email or web address]. By joining [facility]’s FollowMyHealth portal, you will also be able to view your health information using other applications (apps) compatible with our system.

## 2020-02-06 NOTE — PROGRESS NOTE ADULT - SUBJECTIVE AND OBJECTIVE BOX
PROGRESS NOTE:   Christi Ferrera, MS4      Patient is a 29y old  Male who presents with a chief complaint of LE swelling (03 Feb 2020 22:06)      SUBJECTIVE / OVERNIGHT EVENTS: The patient was seen and examined at bedside. Pt expresses frustration at waiting around for the results of the capsule while being NPO and is contemplating leaving AMA again.      REVIEW OF SYSTEMS:    CONSTITUTIONAL: No weakness, fevers or chills  EYES/ENT: No visual changes;  No vertigo or throat pain   NECK: No pain or stiffness  RESPIRATORY: No cough, wheezing, hemoptysis; No shortness of breath  CARDIOVASCULAR: +b/l pitting LE edema. No chest pain or palpitations  GASTROINTESTINAL: +Hematochezia, no melena. No abdominal or epigastric pain. No nausea, vomiting, or hematemesis; No diarrhea or constipation.   GENITOURINARY: No dysuria, frequency or hematuria  NEUROLOGICAL: No numbness or weakness  SKIN: No itching, rashes    MEDICATIONS  (STANDING):  digoxin     Tablet 0.125 milliGRAM(s) Oral daily  furosemide   Injectable 40 milliGRAM(s) IV Push two times a day  influenza   Vaccine 0.5 milliLiter(s) IntraMuscular once  lisinopril 2.5 milliGRAM(s) Oral daily  pantoprazole  Injectable 40 milliGRAM(s) IV Push every 12 hours  spironolactone 25 milliGRAM(s) Oral daily    MEDICATIONS  (PRN):  acetaminophen   Tablet .. 650 milliGRAM(s) Oral every 6 hours PRN Temp greater or equal to 38C (100.4F), Mild Pain (1 - 3), Moderate Pain (4 - 6), Severe Pain (7 - 10)        I&O's Summary    05 Feb 2020 07:01  -  06 Feb 2020 07:00  --------------------------------------------------------  IN: 0 mL / OUT: 300 mL / NET: -300 mL    06 Feb 2020 07:01  -  06 Feb 2020 08:25  --------------------------------------------------------  IN: 0 mL / OUT: 600 mL / NET: -600 mL        PHYSICAL EXAM:  Vital Signs Last 24 Hrs  T(C): 36.7 (06 Feb 2020 06:38), Max: 36.8 (05 Feb 2020 23:30)  T(F): 98.1 (06 Feb 2020 06:38), Max: 98.2 (05 Feb 2020 23:30)  HR: 84 (06 Feb 2020 06:38) (84 - 92)  BP: 117/59 (06 Feb 2020 06:38) (91/75 - 118/53)  RR: 18 (06 Feb 2020 06:38) (14 - 18)  SpO2: 95% (06 Feb 2020 06:38) (94% - 98%)    CONSTITUTIONAL: NAD, well-developed  RESPIRATORY: Normal respiratory effort; lungs are clear to auscultation bilaterally  CARDIOVASCULAR: Regular rate and rhythm, normal S1 and S2, no murmur/rub/gallop; +b/l pitting LE edema (L>R, L: up to mid calf, R up to mid foot). Peripheral pulses are 2+ bilaterally  ABDOMEN: Soft, distended, nontender to palpation, normoactive bowel sounds, no rebound/guarding  MUSCULOSKELETAL: no clubbing or cyanosis of digits; no joint swelling or tenderness to palpation  PSYCH: A+O to person, place, and time; affect appropriate    LABS:    02-05    136  |  101  |  18  ----------------------------<  92  3.7   |  23  |  1.02  02-04    132<L>  |  100  |  22  ----------------------------<  89  3.7   |  23  |  0.93  02-03    131<L>  |  99  |  19  ----------------------------<  92  4.2   |  24  |  0.95    Ca    8.6      05 Feb 2020 07:23  Ca    7.7<L>      04 Feb 2020 04:00  Ca    8.1<L>      03 Feb 2020 17:05  Phos  4.3     02-05  Mg     1.9     02-05    TPro  5.3<L>  /  Alb  3.0<L>  /  TBili  0.8  /  DBili  x   /  AST  25  /  ALT  20  /  AlkPhos  160<H>  02-05  TPro  4.1<L>  /  Alb  2.2<L>  /  TBili  0.4  /  DBili  x   /  AST  16  /  ALT  14  /  AlkPhos  103  02-04  TPro  4.7<L>  /  Alb  2.6<L>  /  TBili  0.4  /  DBili  x   /  AST  18  /  ALT  17  /  AlkPhos  116  02-03    Magnesium, Serum: 1.9 mg/dL (02-05-20 @ 07:23)  Magnesium, Serum: 1.9 mg/dL (02-04-20 @ 04:00)    Phosphorus Level, Serum: 4.3 mg/dL (02-05-20 @ 07:23)  Phosphorus Level, Serum: 4.4 mg/dL (02-04-20 @ 04:00)                          11.1   9.06  )-----------( 247      ( 05 Feb 2020 07:23 )             36.6                         8.6    6.62  )-----------( 184      ( 04 Feb 2020 04:00 )             27.7                         9.8    8.10  )-----------( 192      ( 03 Feb 2020 17:05 )             31.5

## 2020-02-06 NOTE — DISCHARGE NOTE PROVIDER - CARE PROVIDERS DIRECT ADDRESSES
[email or web address]

## 2020-02-06 NOTE — PROGRESS NOTE ADULT - SUBJECTIVE AND OBJECTIVE BOX
Chief Complaint:  Patient is a 29y old  Male who presents with a chief complaint of LE swelling (2020 08:30)      Interval Events:   - patient had a colonoscopy done yesterday with no complications    Allergies:  No Known Allergies      Hospital Medications:  acetaminophen   Tablet .. 650 milliGRAM(s) Oral every 6 hours PRN  digoxin     Tablet 0.125 milliGRAM(s) Oral daily  furosemide   Injectable 40 milliGRAM(s) IV Push two times a day  influenza   Vaccine 0.5 milliLiter(s) IntraMuscular once  lisinopril 2.5 milliGRAM(s) Oral daily  pantoprazole  Injectable 40 milliGRAM(s) IV Push every 12 hours  spironolactone 25 milliGRAM(s) Oral daily      PMHX/PSHX:  HLHS (hypoplastic left heart syndrome)  S/P Fontan procedure      Family history:  Family history of neoplasm of uncertain behavior of connective and other soft tissue      ROS:     General:  No wt loss, fevers, chills, night sweats, fatigue,   Eyes:  Good vision, no reported pain  ENT:  No sore throat, pain, runny nose, dysphagia  CV:  No pain, palpitations, hypo/hypertension  Resp:  No dyspnea, cough, tachypnea, wheezing  GI:  See HPI  :  No pain, bleeding, incontinence, nocturia  Muscle:  No pain, weakness  Neuro:  No weakness, tingling, memory problems  Psych:  No fatigue, insomnia, mood problems, depression  Endocrine:  No polyuria, polydipsia, cold/heat intolerance  Heme:  No petechiae, ecchymosis, easy bruisability  Skin:  No rash, edema      PHYSICAL EXAM:     GENERAL:  Appears stated age, well-groomed, well-nourished, no distress  HEENT:  NC/AT,  conjunctivae clear, sclera -anicteric  CHEST:  Full & symmetric excursion, no increased effort, breath sounds clear  HEART:  Regular rhythm, S1, S2, no murmur/rub/S3/S4,  no edema  ABDOMEN:  Soft, non-tender, non-distended, normoactive bowel sounds,  no masses ,no hepato-splenomegaly,   EXTREMITIES:  no cyanosis,clubbing or edema  SKIN:  No rash/erythema/ecchymoses/petechiae/wounds/abscess/warm/dry  NEURO:  Alert, oriented    Vital Signs:  Vital Signs Last 24 Hrs  T(C): 36.7 (2020 06:38), Max: 36.8 (2020 23:30)  T(F): 98.1 (2020 06:38), Max: 98.2 (2020 23:30)  HR: 84 (2020 06:38) (84 - 92)  BP: 117/59 (2020 06:38) (91/75 - 118/53)  BP(mean): --  RR: 18 (2020 06:38) (14 - 18)  SpO2: 95% (2020 06:38) (94% - 98%)  Daily Height in cm: 167 (2020 08:42)    Daily Weight in k.4 (2020 07:12)    LABS:                        11.1   9.06  )-----------( 247      ( 2020 07:23 )             36.6     02-05    136  |  101  |  18  ----------------------------<  92  3.7   |  23  |  1.02    Ca    8.6      2020 07:23  Phos  4.3     02-05  Mg     1.9     02-05    TPro  5.3<L>  /  Alb  3.0<L>  /  TBili  0.8  /  DBili  x   /  AST  25  /  ALT  20  /  AlkPhos  160<H>  02-05    LIVER FUNCTIONS - ( 2020 07:23 )  Alb: 3.0 g/dL / Pro: 5.3 g/dL / ALK PHOS: 160 u/L / ALT: 20 u/L / AST: 25 u/L / GGT: x             Imaging:  < from: Colonoscopy (20 @ 09:28) >  Findings:       The perianal and digital rectal examinations were normal.       A 3 mm polyp was found in the sigmoid colon. The polyp was sessile. The        polyp was removed with a cold snare. Resection and retrieval were        complete.       Bilious fluid with blood-tinged appearance was found in the ascending        colon and cecum. No underlying lesion was seen. No active bleeding was        seen.       The terminal ileum appeared normal. Dark brown stool was seen in the        terminal ileum.                                                                                   Impression:          - One 3 mm polyp in the sigmoid colon, removed with a   cold snare. Resected and retrieved.                       - Blood in the entire examined colon.                       - The examined portion of the ileum was normal.  Recommendation:      - As no source of blood loss found on EGD/colonoscopy,                  perform video capsule endoscopy.                       - Await pathology results.    < end of copied text >      < from: Upper Endoscopy (20 @ 14:00) >  Findings:       The examined esophagus was normal.       The Z-line was regular and was found 39 cm from the incisors.       The entire examined stomach was normal. Biopsies were taken with a cold        forceps for Helicobacter pylori testing.       The duodenal bulb was normal.       The 2nd part of the duodenum was normal. Bilious fluid was seen        throughout the duodenum.       There was mild friability andself-limited blood oozing in the first        part of the duodenum after endoscope withdrawal. No underlying lesion        noted. Likely secondary trauma from passage of endoscope.              Impression:          - Normal esophagus.                       - Z-line regular, 39 cm from the incisors.                       - Normal stomach. Biopsied.                       - Normal duodenal bulb.                       - Normal 2nd part of the duodenum.  Recommendation:      - [facility].                       - Await pathology results.                       - Clear liquid diet. NPO at MN.                       - Colonoscopy tomorrow for further evaluation of anemia.    < end of copied text >

## 2020-02-06 NOTE — PROGRESS NOTE ADULT - PROBLEM SELECTOR PLAN 6
Transitions of Care Status:  1.  Name of PCP: Dr. Russell Aguilar [phone]  2.  PCP Contacted on Admission: [ ] Y    [ x] N    3.  PCP contacted at Discharge: [ ] Y    [ ] N    [ ] N/A  4.  Post-Discharge Appointment Date and Location:  5.  Summary of Handoff given to PCP:

## 2020-02-06 NOTE — DISCHARGE NOTE PROVIDER - NSFOLLOWUPCLINICS_GEN_ALL_ED_FT
[facility]  Gastroenterology  [address] [place] [id number]  Phone: [phone]  Fax:   Follow Up Time: 1 week

## 2020-02-06 NOTE — DISCHARGE NOTE PROVIDER - CARE PROVIDER_API CALL
Ritchie Mccray)  Internal Medicine  [address] [place], [place] [id number]  Phone: [phone]  Fax: [phone]  Follow Up Time: Ritchie Mccray)  Internal Medicine  [id number] [place] [address]  Phone: [phone]  Fax: [phone]  Follow Up Time:     Demetrio Hernandez)  Adult Congenital Heart Disease; Pediatric Cardiology; Pediatrics  [address] [place] [address]  Phone: [phone]  Fax: [phone]  Established Patient  Follow Up Time: 1 week

## 2020-02-06 NOTE — PROGRESS NOTE ADULT - PROBLEM SELECTOR PLAN 1
Patient with worsening lower extremity edema in the setting of fluid overload as patient received a total of 4U PRBCs from 01/31-02/01. On exam, edema appears to have slightly improved.  -LE duplex negative for DVT  -c/w IV lasix 40mg BID  -strict I/O  -daily weights  -

## 2020-02-06 NOTE — PROGRESS NOTE ADULT - ASSESSMENT
29 year old male with Hypoplastic Left heart s/p fontan procedure previously on aspirin c/b FALD, and anemia of unknown source who presented to the emergency room with chief complain of left lower extremity swelling that started a day prior to hospitalization.      IMPRESSION  - Acute on chronic anemia with melena: s/p EGD and colonoscopy with     RECOMMENDATION    - trend CBC, CMP, INR, transfuse as needed  - continue pantoprazole 40mg IV Qday  - pending results of video capsule endoscopy   - supportive care as per primary team      Deb Iglesias, PGY-6  GI fellow  B- [id number]/ [phone]  Please call GI fellow on call after 5pm and on weekends 29 year old male with Hypoplastic Left heart s/p fontan procedure previously on aspirin c/b FALD, and anemia of unknown source who presented to the emergency room with chief complain of left lower extremity swelling that started a day prior to hospitalization.      IMPRESSION  - Acute on chronic anemia with melena: s/p EGD which was normal and colonoscopy with one 3 mm polyp in the sigmoid colon, removed with a cold snare (resected and retrieved), blood in the entire examined colon, the examined portion of the ileum was normal    RECOMMENDATION    - trend CBC, CMP, INR, transfuse as needed  - continue pantoprazole 40mg IV Qday  - pending results of video capsule endoscopy   - supportive care as per primary team      Deb Iglesias, PGY-6  GI fellow  B- [id number]/ [phone]  Please call GI fellow on call after 5pm and on weekends 29 year old male with Hypoplastic Left heart s/p fontan procedure previously on aspirin c/b FALD, and anemia of unknown source who presented to the emergency room with chief complain of left lower extremity swelling that started a day prior to hospitalization.      IMPRESSION  - Acute on chronic anemia with melena: s/p EGD which was normal and colonoscopy with one 3 mm polyp in the sigmoid colon, removed with a cold snare (resected and retrieved), blood in the entire examined colon, the examined portion of the ileum was normal    RECOMMENDATION  - IV iron while inpatient/prior to discharge; should be discharged on PO iron  - trend CBC, CMP, INR, transfuse as needed  - continue pantoprazole 40mg IV Qday  - pending results of video capsule endoscopy   - given stable Hgb, lack of overt bleeding, patient would prefer to be discharged today and will await call with results of VCE to determine if further workup/endoscopic evaluation is needed  - supportive care as per primary team    Deb Iglesias, PGY-6  GI fellow  B- [id number]/ [phone]  Please call GI fellow on call after 5pm and on weekends 29 year old male with Hypoplastic Left heart s/p fontan procedure previously on aspirin c/b FALD, and anemia of unknown source who presented to the emergency room with chief complain of left lower extremity swelling that started a day prior to hospitalization.      IMPRESSION  - Acute on chronic anemia with melena: s/p EGD which was normal and colonoscopy with one 3 mm polyp in the sigmoid colon, removed with a cold snare (resected and retrieved), blood in the entire examined colon, the examined portion of the ileum was normal    RECOMMENDATION  - IV iron while inpatient/prior to discharge; should be discharged on PO iron  - trend CBC, CMP, INR, transfuse as needed  - continue pantoprazole 40mg IV Qday  - pending results of video capsule endoscopy   - given stable Hgb, lack of overt bleeding, patient would prefer to be discharged today and will await call with results of VCE to determine if further workup/endoscopic evaluation is needed. discussed that VCE results unfortunately delayed this AM due to computer/technical error and he will be informed of results within next day or two.  - supportive care as per primary team    Deb Iglesias, PGY-6  GI fellow  B- [id number]/ [phone]  Please call GI fellow on call after 5pm and on weekends

## 2020-02-06 NOTE — DISCHARGE NOTE PROVIDER - HOSPITAL COURSE
28 YO M with MHx of Hypoplastic Left heart s/p fontan procedure previously on aspirin c/b FALD, and anemia of unknown source who presents with left lower extremity swelling that started a day prior to hospitalization.  Patient states he has baseline edema but it got much worse over the past 48 hours. The LE edema has been attributed to the multiple blood transfusions in the setting of his hypoplastic left heart syndrome and has improved since starting diuretic therapy.  Patient is on Lasix 40mg and 20mg prn and yesterday and on the day of admission, he took Lasix 60mg.  Patient also complaining of worsening abdominal distention.          Patient was recently hospitalized from 1/31-02/01 for symptomatic anemia.  Patient's Hgb 6.  He received 4U PRBCs and was started on protonix ggt.  GI was consulted, who recommended cardiology clearance before the endoscopy procedure. His cardiologist, Dr Hernandez, was called and cleared him for a EGD to find the source of the suspected UGIB.  Patient left AMA before receiving EGD.          In the ED, VSS.  Labs significant for Hgb 9.8, .  Patient received IV lasix 40mg.  CXR with no signs of fluid overload.  Patient received L doppler with no DVT.         During his current stay, he received another unit of pRBC prior to endoscopy to get Hgb > 9, per Dr Hernandez's recommendation. He continued to get diuresed with IV Lasix 40mg BID and the LE edema improved. No active bleed was found on EGD, thus he was scheduled for a colonoscopy the following day. The colonoscopy only found a 3mm polyp which was removed, and kenneth blood in the colon with no site of active bleed, thus a capsule endoscopy was performed. Imaging is yet to be reviewed by GI, but per GI recommendations, the pt may f/u outpt at their clinic after receiving an iron transfusion. Per Dr Hernandez's recommendations, pt is to be d/c on his home regimen of Lasix.

## 2020-02-06 NOTE — DISCHARGE NOTE PROVIDER - PROVIDER TOKENS
PROVIDER:[TOKEN:[[id number]]] PROVIDER:[TOKEN:[[id number]]],PROVIDER:[TOKEN:[[id number]],FOLLOWUP:[1 week],ESTABLISHEDPATIENT:[T]]

## 2020-02-06 NOTE — DISCHARGE NOTE PROVIDER - NSDCCPCAREPLAN_GEN_ALL_CORE_FT
PRINCIPAL DISCHARGE DIAGNOSIS  Diagnosis: Edema of lower extremity  Assessment and Plan of Treatment: You came in because of painful leg swelling, which we believe was caused by the multiple transfusions you received over the last week. As discussed, we recommend weighing yourself daily and contacting your physician, Dr Hernandez, if you find yourself gaining weight and retaining fluid.         SECONDARY DISCHARGE DIAGNOSES  Diagnosis: Anemia  Assessment and Plan of Treatment: While you were here, we continued to look into where the bleed was coming from. You got an upper endoscopy, a colonoscopy, and a capsule endoscopy while you were here. We believe that your iron deficiency anemia got worse because of an active bleed from your intestines. Please continue taking pantoprazole and iron supplements and follow up with the GI doctor within a week. Please do not take your aspirin until the bleeding resolves.

## 2020-02-06 NOTE — PROGRESS NOTE ADULT - PROBLEM SELECTOR PLAN 3
Patient hospitalized 1/31-02/01 for Hgb 6 and received 4U PRBCs and was on protonix ggt; pt left AMA prior to receiving EGD; Pt is s/p EGD, colonoscopy, and capsule endoscopy.  -Pt is NPO pending results of capsule endoscopy to see if an intervention is possible  -GI consulted and following; Hgb > 9 is OK per Dr Hernandez (cards). If GI has questions, call Dr Hernandez at [phone]  -active T&S  -c/w IV protonix 40mg BID  -iron deficiency anemia also in addition to GIB: ferritin 14; total iron 26. Patient hospitalized 1/31-02/01 for Hgb 6 and received 4U PRBCs and was on protonix ggt; pt left AMA prior to receiving EGD; Pt is s/p EGD, colonoscopy, and capsule endoscopy.  -Pt is NPO pending results of capsule endoscopy to see if an intervention is possible  -Per GI rec, will give iron transfusion before leaving. OK to leave and f/u outpt  -active T&S  -c/w IV protonix 40mg BID  -iron deficiency anemia also in addition to GIB: ferritin 14; total iron 26.

## 2020-02-06 NOTE — PROGRESS NOTE ADULT - ATTENDING COMMENTS
Patient seen and examined. Agree with above note by resident.    # Acute blood loss anemia - total of 6 units PRBC transfusion in recent week. S/p EGD and colon with no source of bleeding. Capsule study in progress. F/u GI recs    #LE edema- combination of HF and venous insufficiency. C/w lasix 40IV BID. Per cardiologist, CEZAR home on home regimen.     Plan discussed with patient
Patient seen and examined. Agree with above note by resident.    #Acute on chronic HF - patient with worsening LE edema s/p 4 units PRBC during recent hospitalization. Lungs are clear. Pt is reporting significant improvement in LE edema s/p IV lasix as well. Will c/w lasix 40IV BID for now. Dopplers negative for DVT.     #Acute blood loss anemia - unclear etiology. Will plan for EGD to elucidate the cause. GI input appreciated. Currently receiving 1 UNIT PRBC to optimize patient. After PRBC transfusion, patient is optimized for planned procedure.    Plan discussed with patient and family at bedside
Patient seen and examined. Agree with above note by resident.    # Acute blood loss anemia - total of 6 units PRBC transfusion in recent week. S/p EGD and colon with no source of bleeding. Capsule study in progress. GI recommends outpt follow up for results. Patient without any symptoms at this time. He wishes to go home today.     #LE edema- combination of HF and venous insufficiency. C/w lasix 40IV BID. Per cardiologist, DC home on home regimen. LE edema improvingL>R. Outpt follow up with cardiologist     Plan discussed with patient. DC home time spent 40 mins

## 2020-02-06 NOTE — DISCHARGE NOTE PROVIDER - NSDCMRMEDTOKEN_GEN_ALL_CORE_FT
Colace 100 mg oral capsule: 1 cap(s) orally once a day, As Needed  digoxin 125 mcg (0.125 mg) oral tablet: 1 tab(s) orally once a day  ferrous sulfate 325 mg (65 mg elemental iron) oral tablet: 1 tab(s) orally once a day  Lasix 20 mg oral tablet: 1 tab(s) orally once a day (at bedtime)  Lasix 40 mg oral tablet: 1 tab(s) orally once a day  lisinopril 2.5 mg oral tablet: 1 tab(s) orally once a day  pantoprazole 40 mg oral delayed release tablet: 1 tab(s) orally 2 times a day   spironolactone 25 mg oral tablet: 1 tab(s) orally once a day

## 2020-02-07 DIAGNOSIS — Z71.89 OTHER SPECIFIED COUNSELING: ICD-10-CM

## 2020-02-10 LAB — SURGICAL PATHOLOGY STUDY: SIGNIFICANT CHANGE UP

## 2020-02-12 ENCOUNTER — TRANSCRIPTION ENCOUNTER (OUTPATIENT)
Age: 30
End: 2020-02-12

## 2020-02-12 ENCOUNTER — APPOINTMENT (OUTPATIENT)
Dept: GASTROENTEROLOGY | Facility: CLINIC | Age: 30
End: 2020-02-12
Payer: MEDICARE

## 2020-02-12 VITALS
WEIGHT: 161 LBS | RESPIRATION RATE: 16 BRPM | HEART RATE: 82 BPM | BODY MASS INDEX: 25.88 KG/M2 | DIASTOLIC BLOOD PRESSURE: 70 MMHG | OXYGEN SATURATION: 93 % | SYSTOLIC BLOOD PRESSURE: 112 MMHG | HEIGHT: 66 IN

## 2020-02-12 DIAGNOSIS — K92.1 MELENA: ICD-10-CM

## 2020-02-12 DIAGNOSIS — D50.9 IRON DEFICIENCY ANEMIA, UNSPECIFIED: ICD-10-CM

## 2020-02-12 PROCEDURE — 99203 OFFICE O/P NEW LOW 30 MIN: CPT

## 2020-02-12 PROCEDURE — 99214 OFFICE O/P EST MOD 30 MIN: CPT

## 2020-02-12 RX ORDER — ZOLPIDEM TARTRATE 10 MG/1
10 TABLET ORAL
Qty: 30 | Refills: 0 | Status: ACTIVE | COMMUNITY
Start: 2020-01-28

## 2020-02-12 NOTE — ASSESSMENT
[FreeTextEntry1] : 29M with hypoplastic left heart s/p Fontan procedure previously on ASA c/b FALD, and anemia of unknown source with recent admission at [facility] 1/31-2/1 for melena and symptomatic anemia s/p 4U pRBC and AMA'ed prior to EGD, readmitted at [facility] 2/3 for recurrent symptoms with Hgb 9.8, now s/p EGD, colonoscopy and VCE. \par \par #KORIN: Patient underwent EGD/colonoscopy/VCE: EGD only notable for friability and oozing after passage of endoscope in proximal duodenum (esophagus and stomach unremarkable with biopsies negative for HP), colonoscopy without source of bleed and HP polyp removed, VCE with blood noted in proximal duodenum (likely corresponding to EGD findings)\par --Continue daily iron; may need to be increased in future, but recent iron studies were normal (possibly given recent transfusions and IV iron infusion)\par --Continue PPI BID x 8 weeks given friability in duodenum with associated bleeding (of unknown etiology) as well as ulceration in stomach on VCE thought to be secondary biopsy\par --Will recheck CBC in 1-2 weeks to ensure stable\par --Discussed that if recurrent melena/downtrending Hgb, would recommend beginning with repeat EGD given above findings\par \par RTO ~6-8 weeks

## 2020-02-12 NOTE — HISTORY OF PRESENT ILLNESS
[FreeTextEntry1] : 29M with hypoplastic left heart s/p Fontan procedure previously on ASA c/b FALD, and anemia of unknown source with recent admission at [facility] 1/31-2/1 for melena and symptomatic anemia s/p 4U pRBC and AMA'ed prior to EGD, readmitted at [facility] 2/3 for recurrent symptoms with Hgb 9.8, now s/p EGD, colonoscopy and VCE (see below), here for hospital follow up.\par \par Since discharge, patient reporting he is feeling much better. He denies having ongoing black, tarry stools, but notes has more formed dark stools (and he attributes the appearance to the iron supplementation). No gross blood. No constipation or diarrhea. No abdominal pain, nausea, vomiting. Is currently taking iron once daily and PPI BID. \par \par Recent had blood work with PMD on 2/8: \par 9.5 > 10.2 < 373\par Iron 150, TIBC 400, Trans 38, UIBC 250\par \par EGD 2/4/2020\par Findings:\par      The examined esophagus was normal.\par      The Z-line was regular and was found 39 cm from the incisors.\par      The entire examined stomach was normal. Biopsies were taken with a cold \par      forceps for Helicobacter pylori testing. (PATH: HP NEGATIVE)\par      The duodenal bulb was normal.\par      The 2nd part of the duodenum was normal. Bilious fluid was seen \par      throughout the duodenum.\par      There was mild friability and self-limited blood oozing in the first \par      part of the duodenum after endoscope withdrawal. No underlying lesion \par      noted. Likely secondary trauma from passage of endoscope.\par         \par Colonoscopy 2/5/2020\par Findings:\par      The perianal and digital rectal examinations were normal.\par      A 3 mm polyp was found in the sigmoid colon. The polyp was sessile. The \par      polyp was removed with a cold snare. Resection and retrieval were \par      complete. (PATH: HP)\par      Bilious fluid with blood-tinged appearance was found in the ascending \par      colon and cecum. No underlying lesion was seen. No active bleeding was \par      seen.\par      The terminal ileum appeared normal. Dark brown stool was seen in the \par      terminal ileum.\par \par VCE 2/5/2020: \par Gastric passage time: 16h\par Small bowel passage time: 2h 6m\par Findings: \par Esophagus: grossly normal\par Stomach: Small clean based gastric ulcer, possibly from recent biopsy site\par Duodenum: Proximal duodenum (d@) noted for patchy erythema and fresh blood. There was no additional blood or underlying lesions seen in the remainder of the small bowel, though visualization was limited by prep quality. \par Colon: Poor visualization 2/2 prep quality, but scant blood was noted\par Recommendations: \par - Iron supplementation \par - if persistent anemia, would repeat EGD to re evaluate proximal duodenum. This region, however, likely corresponds to the patchy erythema and friable mucosa that was noted on recent EGD.

## 2020-02-12 NOTE — PHYSICAL EXAM
[General Appearance - Alert] : alert [General Appearance - In No Acute Distress] : in no acute distress [Sclera] : the sclera and conjunctiva were normal [Outer Ear] : the ears and nose were normal in appearance [Neck Appearance] : the appearance of the neck was normal [Auscultation Breath Sounds / Voice Sounds] : lungs were clear to auscultation bilaterally [Heart Rate And Rhythm] : heart rate was normal and rhythm regular [Systolic grade ___/6] : A grade [unfilled]/6 systolic murmur was heard. [Edema] : there was no peripheral edema [Bowel Sounds] : normal bowel sounds [Abdomen Soft] : soft [Abdomen Tenderness] : non-tender [Abdomen Mass (___ Cm)] : no abdominal mass palpated [Involuntary Movements] : no involuntary movements were seen [Skin Color & Pigmentation] : normal skin color and pigmentation [Skin Turgor] : normal skin turgor [] : no rash [No Focal Deficits] : no focal deficits [Oriented To Time, Place, And Person] : oriented to person, place, and time [Impaired Insight] : insight and judgment were intact [Affect] : the affect was normal

## 2020-02-12 NOTE — CONSULT LETTER
[Dear  ___] : Dear  [unfilled], [Referral Letter:] : I am referring [unfilled] to you for further evaluation.  My most recent evaluation follows. [Sincerely,] : Sincerely, [Please see my note below.] : Please see my note below. [FreeTextEntry3] : Ritchie Mccray MD\par Division of Gastroenterology\par [facility]\par

## 2020-02-18 ENCOUNTER — TRANSCRIPTION ENCOUNTER (OUTPATIENT)
Age: 30
End: 2020-02-18

## 2020-02-21 ENCOUNTER — OUTPATIENT (OUTPATIENT)
Dept: OUTPATIENT SERVICES | Facility: HOSPITAL | Age: 30
LOS: 1 days | End: 2020-02-21

## 2020-02-21 VITALS
HEART RATE: 83 BPM | SYSTOLIC BLOOD PRESSURE: 100 MMHG | HEIGHT: 65 IN | RESPIRATION RATE: 16 BRPM | OXYGEN SATURATION: 98 % | DIASTOLIC BLOOD PRESSURE: 60 MMHG | TEMPERATURE: 97 F | WEIGHT: 160.06 LBS

## 2020-02-21 DIAGNOSIS — Z98.890 OTHER SPECIFIED POSTPROCEDURAL STATES: Chronic | ICD-10-CM

## 2020-02-21 DIAGNOSIS — K92.1 MELENA: ICD-10-CM

## 2020-02-21 DIAGNOSIS — F41.9 ANXIETY DISORDER, UNSPECIFIED: ICD-10-CM

## 2020-02-21 DIAGNOSIS — Q23.4 HYPOPLASTIC LEFT HEART SYNDROME: ICD-10-CM

## 2020-02-21 LAB
ALBUMIN SERPL ELPH-MCNC: 3.5 G/DL — SIGNIFICANT CHANGE UP (ref 3.3–5)
ALP SERPL-CCNC: 124 U/L — HIGH (ref 40–120)
ALT FLD-CCNC: 14 U/L — SIGNIFICANT CHANGE UP (ref 4–41)
ANION GAP SERPL CALC-SCNC: 11 MMO/L — SIGNIFICANT CHANGE UP (ref 7–14)
AST SERPL-CCNC: 15 U/L — SIGNIFICANT CHANGE UP (ref 4–40)
BILIRUB SERPL-MCNC: 0.2 MG/DL — SIGNIFICANT CHANGE UP (ref 0.2–1.2)
BLD GP AB SCN SERPL QL: NEGATIVE — SIGNIFICANT CHANGE UP
BUN SERPL-MCNC: 19 MG/DL — SIGNIFICANT CHANGE UP (ref 7–23)
CALCIUM SERPL-MCNC: 8.4 MG/DL — SIGNIFICANT CHANGE UP (ref 8.4–10.5)
CHLORIDE SERPL-SCNC: 95 MMOL/L — LOW (ref 98–107)
CO2 SERPL-SCNC: 25 MMOL/L — SIGNIFICANT CHANGE UP (ref 22–31)
CREAT SERPL-MCNC: 0.84 MG/DL — SIGNIFICANT CHANGE UP (ref 0.5–1.3)
GLUCOSE SERPL-MCNC: 77 MG/DL — SIGNIFICANT CHANGE UP (ref 70–99)
HCT VFR BLD CALC: 30.1 % — LOW (ref 39–50)
HGB BLD-MCNC: 8.6 G/DL — LOW (ref 13–17)
MCHC RBC-ENTMCNC: 25.1 PG — LOW (ref 27–34)
MCHC RBC-ENTMCNC: 28.6 % — LOW (ref 32–36)
MCV RBC AUTO: 88 FL — SIGNIFICANT CHANGE UP (ref 80–100)
NRBC # FLD: 0 K/UL — SIGNIFICANT CHANGE UP (ref 0–0)
PLATELET # BLD AUTO: 237 K/UL — SIGNIFICANT CHANGE UP (ref 150–400)
PMV BLD: 11.3 FL — SIGNIFICANT CHANGE UP (ref 7–13)
POTASSIUM SERPL-MCNC: 4.2 MMOL/L — SIGNIFICANT CHANGE UP (ref 3.5–5.3)
POTASSIUM SERPL-SCNC: 4.2 MMOL/L — SIGNIFICANT CHANGE UP (ref 3.5–5.3)
PROT SERPL-MCNC: 5.8 G/DL — LOW (ref 6–8.3)
RBC # BLD: 3.42 M/UL — LOW (ref 4.2–5.8)
RBC # FLD: 16.6 % — HIGH (ref 10.3–14.5)
RH IG SCN BLD-IMP: POSITIVE — SIGNIFICANT CHANGE UP
SODIUM SERPL-SCNC: 131 MMOL/L — LOW (ref 135–145)
WBC # BLD: 6.18 K/UL — SIGNIFICANT CHANGE UP (ref 3.8–10.5)
WBC # FLD AUTO: 6.18 K/UL — SIGNIFICANT CHANGE UP (ref 3.8–10.5)

## 2020-02-21 RX ORDER — LISINOPRIL 2.5 MG/1
1 TABLET ORAL
Qty: 0 | Refills: 0 | DISCHARGE

## 2020-02-21 RX ORDER — DOCUSATE SODIUM 100 MG
1 CAPSULE ORAL
Qty: 0 | Refills: 0 | DISCHARGE

## 2020-02-21 RX ORDER — FUROSEMIDE 40 MG
1 TABLET ORAL
Qty: 0 | Refills: 0 | DISCHARGE

## 2020-02-21 RX ORDER — SODIUM CHLORIDE 9 MG/ML
1000 INJECTION, SOLUTION INTRAVENOUS
Refills: 0 | Status: DISCONTINUED | OUTPATIENT
Start: 2020-03-03 | End: 2020-03-03

## 2020-02-21 RX ORDER — DIGOXIN 250 MCG
1 TABLET ORAL
Qty: 0 | Refills: 0 | DISCHARGE

## 2020-02-21 RX ORDER — FERROUS SULFATE 325(65) MG
1 TABLET ORAL
Qty: 0 | Refills: 0 | DISCHARGE

## 2020-02-21 RX ORDER — SPIRONOLACTONE 25 MG/1
1 TABLET, FILM COATED ORAL
Qty: 0 | Refills: 0 | DISCHARGE

## 2020-02-21 NOTE — H&P PST ADULT - NSICDXPASTMEDICALHX_GEN_ALL_CORE_FT
PAST MEDICAL HISTORY:  Anemia     HLHS (hypoplastic left heart syndrome) PAST MEDICAL HISTORY:  Anemia     HLHS (hypoplastic left heart syndrome)     Melena PAST MEDICAL HISTORY:  Anemia     Anxiety     HLHS (hypoplastic left heart syndrome)     Melena

## 2020-02-21 NOTE — H&P PST ADULT - NEGATIVE ENMT SYMPTOMS
no post-nasal discharge/no throat pain/no dysphagia/no sinus symptoms/no nasal congestion/no hearing difficulty/no ear pain

## 2020-02-21 NOTE — H&P PST ADULT - NSICDXFAMILYHX_GEN_ALL_CORE_FT
FAMILY HISTORY:  Family history of neoplasm of uncertain behavior of connective and other soft tissue, mother

## 2020-02-21 NOTE — H&P PST ADULT - HISTORY OF PRESENT ILLNESS
29 y.o. male with intermittent c/o weakness, SOB, fatigue, since August 2019, s/p hospitalization in august and november, 2019, reports last hospitalization in February 2020, discharged 02/13/2020, reports blood transfusion x 4 units given  pt scheduled for endoscopy anesthesia on 03/03/2020 29 y.o. male with hx of hypoplastic left heart syndrome, s/p Fontan procedure, reports weakness, fatigue, since August 2019, s/p admission to [facility] 01/31-02/01 for unknown source anemia, x 4 units of PRBC given, s/p colonoscopy, EGD, and readmission 02/03/-02/06/2020 x 1 PRBC given, preop diagnosis melena, pt denies blood in stool, abdominal pain, N/V/D/constipation, weight loss, pt scheduled for endoscopy anesthesia on 03/03/2020

## 2020-02-21 NOTE — H&P PST ADULT - NSICDXPROBLEM_GEN_ALL_CORE_FT
PROBLEM DIAGNOSES  Problem: Melena  Assessment and Plan: pt scheduled for endoscopy anesthesia on 03/03/2020  Preop instructions provided. Pt verbalized understanding.   pt to take protonix for GI prophylaxis      Problem: Anxiety  Assessment and Plan: pt instructed to take xanax on the morning of the procedure with a sip of water    Problem: Hypoplastic left heart  Assessment and Plan: continue meds as prescribed,   take digoxin, lisionpril on the morning of the procedure with a sip of water  last visit cardiologist note in chart, echo from 01/13/2020 in chart

## 2020-02-21 NOTE — H&P PST ADULT - NSICDXPASTSURGICALHX_GEN_ALL_CORE_FT
PAST SURGICAL HISTORY:  S/P Fontan procedure PAST SURGICAL HISTORY:  H/O cardiac catheterization device closure of fenestration    S/P Fontan procedure     S/P Renea operation     Status post bidirectional Justice operation

## 2020-02-21 NOTE — H&P PST ADULT - NEGATIVE GENERAL GENITOURINARY SYMPTOMS
no dysuria/no flank pain R/no bladder infections/no hematuria/no renal colic/no incontinence/no flank pain L

## 2020-02-21 NOTE — H&P PST ADULT - RS GEN PE MLT RESP DETAILS PC
breath sounds equal/airway patent/respirations non-labored/good air movement/no chest wall tenderness/clear to auscultation bilaterally

## 2020-02-24 ENCOUNTER — TRANSCRIPTION ENCOUNTER (OUTPATIENT)
Age: 30
End: 2020-02-24

## 2020-03-03 ENCOUNTER — TRANSCRIPTION ENCOUNTER (OUTPATIENT)
Age: 30
End: 2020-03-03

## 2020-03-03 ENCOUNTER — APPOINTMENT (OUTPATIENT)
Dept: GASTROENTEROLOGY | Facility: HOSPITAL | Age: 30
End: 2020-03-03

## 2020-03-03 ENCOUNTER — INPATIENT (INPATIENT)
Facility: HOSPITAL | Age: 30
LOS: 0 days | Discharge: HOME | End: 2020-03-03
Attending: STUDENT IN AN ORGANIZED HEALTH CARE EDUCATION/TRAINING PROGRAM | Admitting: STUDENT IN AN ORGANIZED HEALTH CARE EDUCATION/TRAINING PROGRAM
Payer: MEDICARE

## 2020-03-03 VITALS
DIASTOLIC BLOOD PRESSURE: 52 MMHG | WEIGHT: 160.06 LBS | SYSTOLIC BLOOD PRESSURE: 113 MMHG | HEART RATE: 77 BPM | OXYGEN SATURATION: 100 % | TEMPERATURE: 98 F | RESPIRATION RATE: 18 BRPM | HEIGHT: 66 IN

## 2020-03-03 VITALS
WEIGHT: 160.06 LBS | OXYGEN SATURATION: 95 % | TEMPERATURE: 97 F | HEIGHT: 66 IN | RESPIRATION RATE: 20 BRPM | DIASTOLIC BLOOD PRESSURE: 51 MMHG | SYSTOLIC BLOOD PRESSURE: 91 MMHG | HEART RATE: 75 BPM

## 2020-03-03 DIAGNOSIS — D64.9 ANEMIA, UNSPECIFIED: ICD-10-CM

## 2020-03-03 DIAGNOSIS — Z29.9 ENCOUNTER FOR PROPHYLACTIC MEASURES, UNSPECIFIED: ICD-10-CM

## 2020-03-03 DIAGNOSIS — Z98.890 OTHER SPECIFIED POSTPROCEDURAL STATES: Chronic | ICD-10-CM

## 2020-03-03 DIAGNOSIS — K92.1 MELENA: ICD-10-CM

## 2020-03-03 DIAGNOSIS — Q23.4 HYPOPLASTIC LEFT HEART SYNDROME: ICD-10-CM

## 2020-03-03 DIAGNOSIS — Z02.9 ENCOUNTER FOR ADMINISTRATIVE EXAMINATIONS, UNSPECIFIED: ICD-10-CM

## 2020-03-03 LAB
BLD GP AB SCN SERPL QL: NEGATIVE — SIGNIFICANT CHANGE UP
HCT VFR BLD CALC: 24.1 % — LOW (ref 39–50)
HCT VFR BLD CALC: 28.4 % — LOW (ref 39–50)
HGB BLD-MCNC: 6.9 G/DL — CRITICAL LOW (ref 13–17)
HGB BLD-MCNC: 8.2 G/DL — LOW (ref 13–17)
MCHC RBC-ENTMCNC: 25.3 PG — LOW (ref 27–34)
MCHC RBC-ENTMCNC: 25.5 PG — LOW (ref 27–34)
MCHC RBC-ENTMCNC: 28.6 % — LOW (ref 32–36)
MCHC RBC-ENTMCNC: 28.9 % — LOW (ref 32–36)
MCV RBC AUTO: 88.3 FL — SIGNIFICANT CHANGE UP (ref 80–100)
MCV RBC AUTO: 88.5 FL — SIGNIFICANT CHANGE UP (ref 80–100)
NRBC # FLD: 0 K/UL — SIGNIFICANT CHANGE UP (ref 0–0)
NRBC # FLD: 0 K/UL — SIGNIFICANT CHANGE UP (ref 0–0)
PLATELET # BLD AUTO: 213 K/UL — SIGNIFICANT CHANGE UP (ref 150–400)
PLATELET # BLD AUTO: 229 K/UL — SIGNIFICANT CHANGE UP (ref 150–400)
PMV BLD: 11.2 FL — SIGNIFICANT CHANGE UP (ref 7–13)
PMV BLD: 11.2 FL — SIGNIFICANT CHANGE UP (ref 7–13)
RBC # BLD: 2.73 M/UL — LOW (ref 4.2–5.8)
RBC # BLD: 3.21 M/UL — LOW (ref 4.2–5.8)
RBC # FLD: 17.4 % — HIGH (ref 10.3–14.5)
RBC # FLD: 17.8 % — HIGH (ref 10.3–14.5)
RH IG SCN BLD-IMP: POSITIVE — SIGNIFICANT CHANGE UP
WBC # BLD: 8.14 K/UL — SIGNIFICANT CHANGE UP (ref 3.8–10.5)
WBC # BLD: 8.49 K/UL — SIGNIFICANT CHANGE UP (ref 3.8–10.5)
WBC # FLD AUTO: 8.14 K/UL — SIGNIFICANT CHANGE UP (ref 3.8–10.5)
WBC # FLD AUTO: 8.49 K/UL — SIGNIFICANT CHANGE UP (ref 3.8–10.5)

## 2020-03-03 PROCEDURE — 99223 1ST HOSP IP/OBS HIGH 75: CPT | Mod: GC

## 2020-03-03 RX ORDER — FUROSEMIDE 40 MG
1 TABLET ORAL
Qty: 0 | Refills: 0 | DISCHARGE

## 2020-03-03 RX ORDER — PANTOPRAZOLE SODIUM 20 MG/1
0 TABLET, DELAYED RELEASE ORAL
Qty: 0 | Refills: 0 | DISCHARGE

## 2020-03-03 RX ORDER — INFLUENZA VIRUS VACCINE 15; 15; 15; 15 UG/.5ML; UG/.5ML; UG/.5ML; UG/.5ML
0.5 SUSPENSION INTRAMUSCULAR ONCE
Refills: 0 | Status: DISCONTINUED | OUTPATIENT
Start: 2020-03-03 | End: 2020-03-03

## 2020-03-03 NOTE — H&P ADULT - NSHPLABSRESULTS_GEN_ALL_CORE
6.9    8.49  )-----------( 213      ( 03 Mar 2020 07:50 )             24.1                               Lactate Trend            CAPILLARY BLOOD GLUCOSE 6.9    8.49  )-----------( 213      ( 03 Mar 2020 07:50 )             24.1      < from: Upper Endoscopy (03.03.20 @ 07:39) >    Impression:          - Normal esophagus.                       - Z-line regular.                       - Normal stomach.             - Erythematous duodenopathy.                       - A few angioectasias in the second part of duodenum,                        just proximal to the ampulla. Treated with argon plasma                        coagulation (APC). Clip was placed.                       - Normal ampulla.                       - No specimens collected.  Recommendation:      - Discharge patient to home (with escort).                       - Advance diet as tolerated.                       - Follow up CBC (ordered today).                       - Arrange hematology appointment for close monitoring of                        Hgb, iron and for possible IV iron/transfusions as                        needed (patient lives in [facility] and more difficult                 to follow regularly in [facility]).                       - Continue with iron supplementation.                       - Take PPI 40mg daily for 2 weeks to assist with healing                        of ulcers post-treatment.                 - Follow up with Dr. Mccray in 6-8 weeks.                                                                                   Attending Participation:       I personally performed the entire procedure.

## 2020-03-03 NOTE — DISCHARGE NOTE PROVIDER - CARE PROVIDER_API CALL
Spoke with Etienne Grove, Adult Crisis  at [facility].  Writer informed her of patient discharge.  Reported that patient did not have medications upon discharge and that the scripts for medications were sent to the [facility] [address] in [place].  Informed Etienne that patient was instructed to call or go to the [facility] tomorrow 6/28/19 to get assistance with needs and medication management.  Writer also informed Etienne that patients mother was inquiring about a test for Weld's Disease.  Etienne reports that she made note and that they would likely get patient set up with Medicaid and then will follow-up.  Writer provided contact information.     Ritchie Mccray)  Internal Medicine  [id number] [place] [address]  Phone: [phone]  Fax: [phone]  Follow Up Time:     Russell Aguilar [facility]  Phone: [phone]  Fax: (   )    -  Follow Up Time:

## 2020-03-03 NOTE — H&P ADULT - HISTORY OF PRESENT ILLNESS
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 EGD was performed, which was notable for few angioectasias in D2 s/p APC. Post procedure CBC showing Hgb 6.9. Pt scheduled to receive 1 unit pRBC and expressed the desire to leave after the transfusion. 30 YO M with MHx of Hypoplastic Left heart s/p fontan procedure on aspirin c/b FALD, and anemia, s/p EGD today (scheduled procedure in endo suite) which was notable for few angioectasias in D2 s/p APC, after procedure found to be anemic to 6.9 and admitted to medicine service for blood transfusion. S/p 1U prbc showing...  Currently denies F/C, CP, SOB, abn pain, N/V/D/C, change in urinary freq, dysuria. 28 YO M with MHx of Hypoplastic Left heart s/p fontan procedure on aspirin c/b FALD, and anemia, s/p EGD today (scheduled procedure in endo suite) which was notable for few angioectasias in D2 s/p APC, after procedure found to be anemic to 6.9 and admitted to medicine service for blood transfusion. S/p 1U prbc showing Hgb 8.2  Currently denies F/C, CP, SOB, abn pain, N/V/D/C, change in urinary freq, dysuria. 30 YO M with MHx of Hypoplastic Left heart s/p fontan procedure on aspirin c/b FALD, and anemia, s/p EGD today (scheduled procedure in endo suite) which was notable for few angioectasias in D2 s/p APC, also found to be anemic in endo suite to 6.9 and admitted to medicine service for blood transfusion. S/p 1U prbc showing Hgb 8.2. Currently denies F/C, CP, SOB, abn pain, N/V/D/C, change in urinary freq, dysuria.

## 2020-03-03 NOTE — DISCHARGE NOTE PROVIDER - NSDCCPCAREPLAN_GEN_ALL_CORE_FT
PRINCIPAL DISCHARGE DIAGNOSIS  Diagnosis: Anemia  Assessment and Plan of Treatment: You were found to have anemia with hemoglobin of 6.9, for which you recieved a blood transfusion. Abnormal vessels were found in your stomach that were treated by the GI doctors. You had a repeat blood test of your hemoglobin but did not want to stay for the results. You decided to sign out against medical advice. You were warned about the risks of signing out against medical advice. Please follow up with your GI doctor, primary care doctor and hematology doctor within a few days of discharge to obtain a repeat blood test for your anemia and for further testing and management. You should return to ED for urgent evaluation if recurrent melena(dark stool), weakness, difficulty breathing or other symptoms that cause you to feel unwell. PRINCIPAL DISCHARGE DIAGNOSIS  Diagnosis: Anemia  Assessment and Plan of Treatment: You were found to have anemia with hemoglobin of 6.9, for which you recieved a blood transfusion. Abnormal vessels were found in your stomach that were treated by the GI doctors. You had a repeat blood test of your hemoglobin after the transfusion, which was 8.2.Please follow up with your GI doctor, primary care doctor and hematology doctor within a few days of discharge to obtain a repeat blood test for your anemia and for further testing and management. You should return to ED for urgent evaluation if recurrent melena(dark stool), weakness, difficulty breathing or other symptoms that cause you to feel unwell. PRINCIPAL DISCHARGE DIAGNOSIS  Diagnosis: Anemia  Assessment and Plan of Treatment: You were found to have anemia with hemoglobin of 6.9, for which you recieved a blood transfusion. Abnormal vessels were found in your stomach that were treated by the GI doctors. You had a repeat blood test of your hemoglobin after the transfusion, which was 8.2, showing adequate response to the transfusion.Please follow up with your GI doctor, primary care doctor and hematology doctor within a few days of discharge to obtain a repeat blood test for your anemia and for further testing and management. You should return to ED for urgent evaluation if recurrent melena(dark stool), weakness, difficulty breathing or other symptoms that cause you to feel unwell. PRINCIPAL DISCHARGE DIAGNOSIS  Diagnosis: Anemia  Assessment and Plan of Treatment: You were found to have anemia with hemoglobin of 6.9, for which you recieved a blood transfusion. Abnormal vessels were found in your stomach that were treated by the GI doctors. You had a repeat blood test of your hemoglobin after the transfusion, which was 8.2, showing adequate response to the transfusion.Please follow up with your primary care doctor and hematology doctor within a few days of discharge to obtain a repeat blood test for your anemia and for further testing and management. Please follow up with a GI doctor in 6-8 weeks. You should return to ED for urgent evaluation if recurrent melena(dark stool), weakness, difficulty breathing or other symptoms that cause you to feel unwell.

## 2020-03-03 NOTE — DISCHARGE NOTE NURSING/CASE MANAGEMENT/SOCIAL WORK - NSDCFUADDAPPT_GEN_ALL_CORE_FT
You and your mother conveyed that you plan to look for a hematologist to follow up with near your home. You have been coordinating the appointment with a hematologist you plan to see, but you are unable to provide the name of this doctor. If you cannot find a hematologist, talk to your primary care doctor for a referral or consider seeing local hematologist such as Dr. Arturo Brennan [phone].It's important that you schedule an appointment as soon as possible close monitoring of your anemia, iron and for possible IV iron/transfusions as needed.

## 2020-03-03 NOTE — DISCHARGE NOTE PROVIDER - HOSPITAL COURSE
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 EGD was performed, which was notable for few angioectasias in D2 s/p APC. Post procedure CBC showing Hgb 6.9. Pt received 1 unit pRBC and expressed the desire to leave after the transfusion. Post transfusion CBC to be drawn but pt did not want to wait for the results and decided to leave AMA. Pt explained the risks of leaving against medical advice, inlcuding persistent anemia, symptoms including but not limited to: shortness of breath/dizziness, complications such as heart attack, stroke and death with teach back. 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 EGD was performed, which was notable for few angioectasias in D2 s/p APC. Post procedure CBC showing Hgb 6.9. Pt received 1 unit pRBC and expressed the desire to leave after the transfusion. Post transfusion CBC was 8.2. Pt responded appropriately and denied any acute symptoms. Pt was stabele for discharge with close outpatient follow up. 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 EGD was performed, which was notable for few angioectasias in D2 s/p APC. Post procedure CBC showing Hgb 6.9. Pt received 1 unit pRBC and expressed the desire to leave after the transfusion. Post transfusion CBC was 8.2. Pt responded appropriately and denied any acute symptoms. Pt was stable for discharge with close outpatient follow up. 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 EGD was performed, which was notable for few angioectasias in D2 s/p APC. Post procedure CBC showing Hgb 6.9. Pt received 1 unit pRBC, post transfusion CBC was 8.2. Pt responded appropriately and denied any acute symptoms. Pt optimized for discharge and will f/u with PMD on 3/4/20 for repeat labs. instructed patient to return to ED if develops melena/BRBPR. patient will see PMD for referral for hematology eval.

## 2020-03-03 NOTE — DISCHARGE NOTE PROVIDER - CARE PROVIDERS DIRECT ADDRESSES
[email or web address],DirectAddress_Unknown

## 2020-03-03 NOTE — DISCHARGE NOTE PROVIDER - NSDCFUSCHEDAPPT_GEN_ALL_CORE_FT
HUBRET AGUILAR ; 04/13/2020 ; NPP Gastro [facility] HUBERT AGUILAR ; 04/13/2020 ; NPP Gastro [facility] HUBERT AGULIAR ; 04/13/2020 ; NPP Gastro [address] [facility] HUBERT AGUILAR ; 04/13/2020 ; NPP Gastro [address] [facility] HUBERT AGUILAR ; 04/13/2020 ; NPP Gastro [address] [facility] HUBERT AGUILAR ; 04/13/2020 ; NPP Gastro [address] [facility] HUBERT AGUILAR ; 04/13/2020 ; NPP Gastro [facility] HUBERT AGUILAR ; 04/13/2020 ; NPP Gastro [address] [facility]

## 2020-03-03 NOTE — CHART NOTE - NSCHARTNOTEFT_GEN_A_CORE
Patient presented for outpatient EGD for acute on chronic anemia.  Patient reports melenic-appearing stools yesterday.   CBC drawn pre-procedure.   EGD performed, which was notable for few angioectasias in D2 s/p APC (see below).   CBC resulted post-procedure with Hgb 6.9.   Discussed above results with patient. He is willing to stay this afternoon for transfusion, but likely will be unwilling to stay overnight for longer admission.     - Advance diet as tolerated.  - Transfuse 1-2U pRBC to Hgb > 7  - Arrange hematology appointment near patient's hometown for close monitoring of Hgb, iron and for possible IV iron/transfusions as needed (patient lives in [facility] and more difficult to follow regularly in [facility]).  - Continue with iron supplementation.  - Take PPI 40mg daily for 2 weeks to assist with healing of ulcers post-treatment.  - Follow up with Dr. Mccray in GI office in ~6-8 weeks.  - Should return to ED for urgent evaluation if recurrent melena, weakness, etc.      EGD/Upper Endoscopy (03.03.20 @ 07:39)  --------------------------------------------------  Findings:       The examined esophagus was normal.       The Z-line was regular.       The entire examined stomach was normal.       Patches of punctate erythematous mucosa without active bleeding was        found in the duodenal bulb.      Patches of punctate erythematous mucosa without active bleeding was        found in the first part of the duodenum and in the second part of the        duodenum.       A small blood clot was seen in the second part of the duodenum along        with bilious fluid. Given proximity to the ampulla, a side-viewing        endoscope was introduced to better evaluate the region. A few diminutive        angioectasias were found just proximal to the ampulla, the presumed        source of the patient's bleeding. Coagulation for hemostasis using argon        plasma was successful. To prevent bleeding post-intervention, one        hemostatic clip was successfully placed (MR conditional). There was no        bleeding at the end of the procedure.       The ampulla was normal in appearance. Patient presented for outpatient EGD for acute on chronic anemia.  Patient reports melenic-appearing stools yesterday.   CBC drawn pre-procedure.   EGD performed, which was notable for few angioectasias in D2 s/p APC (see below).   CBC resulted post-procedure with Hgb 6.9.   Discussed above results with patient. He is willing to stay this afternoon for transfusion, but likely will be unwilling to stay overnight for longer admission (however, unable to discharge from endo unit to ED for transfusion - patient requires admission)    - Advance diet as tolerated.  - Transfuse 1-2U pRBC to Hgb > 7  - Arrange hematology appointment near patient's hometown for close monitoring of Hgb, iron and for possible IV iron/transfusions as needed (patient lives in [facility] and more difficult to follow regularly in [facility]).  - Continue with iron supplementation.  - Take PPI 40mg daily for 2 weeks to assist with healing of ulcers post-treatment.  - Follow up with Dr. Mccray in GI office in ~6-8 weeks.  - Should return to ED for urgent evaluation if recurrent melena, weakness, etc.      EGD/Upper Endoscopy (03.03.20 @ 07:39)  --------------------------------------------------  Findings:       The examined esophagus was normal.       The Z-line was regular.       The entire examined stomach was normal.       Patches of punctate erythematous mucosa without active bleeding was        found in the duodenal bulb.      Patches of punctate erythematous mucosa without active bleeding was        found in the first part of the duodenum and in the second part of the        duodenum.       A small blood clot was seen in the second part of the duodenum along        with bilious fluid. Given proximity to the ampulla, a side-viewing        endoscope was introduced to better evaluate the region. A few diminutive        angioectasias were found just proximal to the ampulla, the presumed        source of the patient's bleeding. Coagulation for hemostasis using argon        plasma was successful. To prevent bleeding post-intervention, one        hemostatic clip was successfully placed (MR conditional). There was no        bleeding at the end of the procedure.       The ampulla was normal in appearance.

## 2020-03-03 NOTE — DISCHARGE NOTE PROVIDER - NSDCMRMEDTOKEN_GEN_ALL_CORE_FT
digoxin 125 mcg (0.125 mg) oral tablet: 1 tab(s) orally once a day  Iron: 45 milligram(s) orally 3 times a day  Lasix 20 mg oral tablet: 1 tab(s) orally once a day (at bedtime)  Lasix 40 mg oral tablet: 1 tab(s) orally once a day  lisinopril 2.5 mg oral tablet: 1 tab(s) orally once a day  Protonix 40 mg oral delayed release tablet: orally 2 times a day  spironolactone 25 mg oral tablet: orally once a day  Xanax 0.5 mg oral tablet: digoxin 125 mcg (0.125 mg) oral tablet: 1 tab(s) orally once a day  Iron: 45 milligram(s) orally 3 times a day  Lasix 40 mg oral tablet: 1 tab(s) orally once a day in the evening, As Needed  Lasix 40 mg oral tablet: 1 tab(s) orally once a day in AM  lisinopril 2.5 mg oral tablet: 1 tab(s) orally once a day  Protonix 40 mg oral delayed release tablet: orally 2 times a day  spironolactone 25 mg oral tablet: orally once a day  Xanax 0.5 mg oral tablet: digoxin 125 mcg (0.125 mg) oral tablet: 1 tab(s) orally once a day  Iron: 45 milligram(s) orally 3 times a day  Lasix 40 mg oral tablet: 1 tab(s) orally once a day in the evening, As Needed  Lasix 40 mg oral tablet: 1 tab(s) orally once a day in AM  lisinopril 2.5 mg oral tablet: 1 tab(s) orally once a day  Protonix 40 mg oral delayed release tablet: 1 tab(s) orally once a day for two weeks  spironolactone 25 mg oral tablet: orally once a day  Xanax 0.5 mg oral tablet:

## 2020-03-03 NOTE — H&P ADULT - PROBLEM SELECTOR PLAN 1
likely 2/2 to angioectasias now s/p APC  Hgb 6.9, pt to receive 1 unit pRBC acute on chronic anemia likely 2/2 to angioectasias now s/p APC  Hgb 6.9, pt to receive 1 unit pRBC  post- cbc showing ..... acute on chronic anemia likely 2/2 to angioectasias now s/p APC  Hgb 6.9, pt to receive 1 unit pRBC  post- cbc showing 8.2, w/ adequate response acute on chronic anemia likely 2/2 to angioectasias now s/p APC  Hgb 6.9, pt to receive 1 unit pRBC  post- cbc showing 8.2, w/ adequate response  will d/c and patient will f/u with PMD on 3/4/20 for repeat labs. instructed patient to return to ED if develops melena/BRBPR. patient will see PMD for referral for hematology eval.

## 2020-03-03 NOTE — H&P ADULT - PROBLEM SELECTOR PLAN 2
2/2 to UGI bleed, s/p angioectasias 2/2 to UGI bleed, s/p APC of angioectasias -c/w lasix, spironolactone, digoxin, lisinopril

## 2020-03-03 NOTE — H&P ADULT - ASSESSMENT
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 found to have acute on chronic anemia, who is admitted to the medicine service for a blood transfusion

## 2020-03-03 NOTE — DISCHARGE NOTE NURSING/CASE MANAGEMENT/SOCIAL WORK - PATIENT PORTAL LINK FT
You can access the FollowMyHealth Patient Portal offered by [facility] by registering at the following website: [email or web address]. By joining [employer]’s FollowMyHealth portal, you will also be able to view your health information using other applications (apps) compatible with our system.

## 2020-03-03 NOTE — DISCHARGE NOTE PROVIDER - NSDCFUADDAPPT_GEN_ALL_CORE_FT
You and your mother conveyed that you plan to look for a hematologist to follow up with near your home. You have been coordinating the appointment with a hematologist you plan to see, but you are unable to provide the name. If you cannot find a hematologist, talk to your primary care doctor for a referral or consider seeing local hematologist such as Dr. Arturo Brennan [phone]. You and your mother conveyed that you plan to look for a hematologist to follow up with near your home. You have been coordinating the appointment with a hematologist you plan to see, but you are unable to provide the name of this doctor. If you cannot find a hematologist, talk to your primary care doctor for a referral or consider seeing local hematologist such as Dr. Arturo Brennan [phone].It's important that you schedule an appointment as soon as possible close monitoring of your anemia, iron and for possible IV iron/transfusions as needed.

## 2020-03-03 NOTE — H&P ADULT - NSHPREVIEWOFSYSTEMS_GEN_ALL_CORE
REVIEW OF SYSTEMS:    CONSTITUTIONAL: No weakness, fevers or chills  EYES/ENT: No visual changes;  no throat pain   NECK: No pain or stiffness  RESPIRATORY: No cough, wheezing, hemoptysis; No shortness of breath  CARDIOVASCULAR: No chest pain or palpitations  GASTROINTESTINAL: No abdominal or epigastric pain. No nausea, vomiting, or hematemesis; No diarrhea or constipation. No melena or hematochezia.  GENITOURINARY: No dysuria, change in frequency or hematuria  NEUROLOGICAL: No numbness or weakness  SKIN: No itching, burning, rashes, or lesions   Psych: No depression   MSK: no joint pain

## 2020-03-03 NOTE — DISCHARGE NOTE PROVIDER - NSDCCPTREATMENT_GEN_ALL_CORE_FT
PRINCIPAL PROCEDURE  Procedure: EGD  Findings and Treatment: A camera was used to look at your GI tract. It showed                      - Normal esophagus.                       - Z-line regular.                       - Normal stomach.                       - Erythematous duodenopathy.                       - A few angioectasias in the second part of duodenum,                        just proximal to the ampulla. Treated with argon plasma                        coagulation (APC). Clip was placed.                       - Normal ampulla.                       - No specimens collected.

## 2020-03-03 NOTE — H&P ADULT - NSICDXPASTSURGICALHX_GEN_ALL_CORE_FT
PAST SURGICAL HISTORY:  H/O cardiac catheterization device closure of fenestration    S/P Fontan procedure     S/P Renea operation     Status post bidirectional Justice operation

## 2020-03-03 NOTE — H&P ADULT - PROBLEM SELECTOR PLAN 4
DVT ppx: hold since pt ambulatory, anemic, bleeding and plans to leave today  Diet: advance as tolerated Transitions of Care Status:  1.  Name of PCP: BEAR Neumann  2.  PCP Contacted on Admission: [ ] Y    [ x] N  -office closed  3.  PCP contacted at Discharge: [ ] Y    [ ] N    [ ] N/A  4.  Post-Discharge Appointment Date and Location:  5.  Summary of Handoff given to PCP:

## 2020-03-03 NOTE — H&P ADULT - NSHPPHYSICALEXAM_GEN_ALL_CORE
VITAL SIGNS (Last 24 hrs):  T(C): 36.4 (03-03-20 @ 08:55), Max: 36.4 (03-03-20 @ 08:55)  HR: 70 (03-03-20 @ 12:10) (60 - 75)  BP: 91/60 (03-03-20 @ 12:10) (70/54 - 100/51)  RR: 18 (03-03-20 @ 12:10) (16 - 21)  SpO2: 95% (03-03-20 @ 12:10) (93% - 98%)  Wt(kg): --  Daily Height in cm: 167.64 (03 Mar 2020 07:45)    Daily     I&O's Summary    03 Mar 2020 07:01  -  03 Mar 2020 16:14  --------------------------------------------------------  IN: 0 mL / OUT: 400 mL / NET: -400 mL VITAL SIGNS (Last 24 hrs):  T(C): 36.4 (03-03-20 @ 08:55), Max: 36.4 (03-03-20 @ 08:55)  HR: 70 (03-03-20 @ 12:10) (60 - 75)  BP: 91/60 (03-03-20 @ 12:10) (70/54 - 100/51)  RR: 18 (03-03-20 @ 12:10) (16 - 21)  SpO2: 95% (03-03-20 @ 12:10) (93% - 98%)  Wt(kg): --  Daily Height in cm: 167.64 (03 Mar 2020 07:45)    Daily     I&O's Summary    03 Mar 2020 07:01  -  03 Mar 2020 16:14  --------------------------------------------------------  IN: 0 mL / OUT: 400 mL / NET: -400 mL    GENERAL: NAD  HEENT: EOMI, MMM, no oropharyngeal lesions or erythema appreciated  Pulm: normal work of breathing, CTABL  CV: RRR, S1&S2+, systolic murmur  ABDOMEN: soft, nt, nd,   MSK: nl ROM  EXTREMITIES:  no appreciable edema in b/l LE  Neuro: A&Ox3, no focal deficits  SKIN: warm and dry, no visible rash VITAL SIGNS (Last 24 hrs):  T(C): 36.4 (03-03-20 @ 08:55), Max: 36.4 (03-03-20 @ 08:55)  HR: 70 (03-03-20 @ 12:10) (60 - 75)  BP: 91/60 (03-03-20 @ 12:10) (70/54 - 100/51)  RR: 18 (03-03-20 @ 12:10) (16 - 21)  SpO2: 95% (03-03-20 @ 12:10) (93% - 98%)  Wt(kg): --  Daily Height in cm: 167.64 (03 Mar 2020 07:45)    Daily     I&O's Summary    03 Mar 2020 07:01  -  03 Mar 2020 16:14  --------------------------------------------------------  IN: 0 mL / OUT: 400 mL / NET: -400 mL    GENERAL: NAD  HEENT: EOMI, MMM, no oropharyngeal lesions or erythema appreciated  Pulm: normal work of breathing, CTABL  CV: RRR, S1&S2+, systolic murmur  ABDOMEN: soft, nt, nd,   MSK: nl ROM  EXTREMITIES:  2+ LE edema b/l  Neuro: A&Ox3, no focal deficits  SKIN: warm and dry, no visible rash

## 2020-03-03 NOTE — DISCHARGE NOTE PROVIDER - PROVIDER TOKENS
PROVIDER:[TOKEN:[[id number]]],FREE:[LAST:[Amnott],FIRST:[Russell],PHONE:[phone]],FAX:[(   )    -],ADDRESS:[[address] [place] [address] [address]]]

## 2020-03-03 NOTE — ASU PATIENT PROFILE, ADULT - PSH
H/O cardiac catheterization  device closure of fenestration  S/P Fontan procedure    S/P Renea operation    Status post bidirectional Justice operation

## 2020-03-03 NOTE — H&P ADULT - PROBLEM SELECTOR PLAN 3
DVT ppx: hold since pt ambulatory -c/w lasix, spironolactone, digoxin, lisinopril DVT ppx: improve score 0, monitor off dvt ppx   Diet: regular  dispo: pending cbc, if appropriate response to 1U will d/c with outpt PMD f/u DVT ppx: improve score 0, monitor off dvt ppx   Diet: regular  dispo: home

## 2020-03-06 RX ORDER — PANTOPRAZOLE 40 MG/1
40 TABLET, DELAYED RELEASE ORAL DAILY
Qty: 30 | Refills: 2 | Status: DISCONTINUED | COMMUNITY
Start: 2020-01-31 | End: 2020-03-06

## 2020-03-18 NOTE — DISCHARGE NOTE NURSING/CASE MANAGEMENT/SOCIAL WORK - NSTRANSFERBELONGINGSDISPO_GEN_A_NUR
Hx of acute lorena recently with per lorena tube  s/p open cholecystectomy  Surg care  NPO for now   IV fluid hydration   afebrile  COnt zosyn   Pan Cx   tylenol for fever with patient

## 2020-04-27 ENCOUNTER — APPOINTMENT (OUTPATIENT)
Dept: GASTROENTEROLOGY | Facility: CLINIC | Age: 30
End: 2020-04-27

## 2020-06-17 PROBLEM — F41.9 ANXIETY DISORDER, UNSPECIFIED: Chronic | Status: ACTIVE | Noted: 2020-02-21

## 2020-06-17 PROBLEM — K92.1 MELENA: Chronic | Status: ACTIVE | Noted: 2020-02-21

## 2020-06-17 PROBLEM — D64.9 ANEMIA, UNSPECIFIED: Chronic | Status: ACTIVE | Noted: 2020-02-21

## 2020-06-18 ENCOUNTER — APPOINTMENT (OUTPATIENT)
Dept: PEDIATRIC CARDIOLOGY | Facility: CLINIC | Age: 30
End: 2020-06-18
Payer: MEDICARE

## 2020-06-18 PROCEDURE — 99214 OFFICE O/P EST MOD 30 MIN: CPT | Mod: 95

## 2020-06-24 NOTE — REVIEW OF SYSTEMS
[Abdominal Swelling] : abdominal swelling [Feeling Poorly] : not feeling poorly (malaise) [Edema] : no edema [Chest Pain] : no chest pain or discomfort [Exercise Intolerance] : no persistence of exercise intolerance [Palpitations] : no palpitations [Orthopnea] : no orthopnea [Cough] : no cough [Shortness Of Breath] : not expressed as feeling short of breath [Abdominal Pain] : no abdominal pain [Fainting (Syncope)] : no fainting [Easy Bruising] : no tendency for easy bruising [FreeTextEntry2] : abdominal swelling present but improved [Depression] : no depression

## 2020-06-24 NOTE — DISCUSSION/SUMMARY
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 During his Telethealth visit today he states that the brief increase in diuretics seemed to have helped and he is now backing down to his usual dose. He remains clinically asymptomatic from a cardiac standpoint and do not feel that there is a reason to bring him to the office for an in person visit at this time.\par \par We did discuss that we would like him to see the hepatologist as we screen all of our Fontan patients for early liver dysfunction. We will try and arrange a coordinated visit with Dr. Fry and our office on the same day sometime in the early fall. [Influenza vaccine is recommended] : Influenza vaccine is recommended [Needs SBE Prophylaxis] : [unfilled]  needs bacterial endocarditis prophylaxis. SBE prophylaxis is indicated for dental and invasive ENT procedures. (Circulation. 2007; 116: 3502-1398) [PE + No Strenuous Varsity Sports] : [unfilled] may participate in the regular physical education program, however, NO VARSITY COMPETITIVE SPORTS where there is strenuous trainng and prolonged physical exertion ( e.g. football, hockey, wrestling, lacrosse, soccer and basketball). Less strenuous sports such as baseball and golf are acceptable at the varsity level.

## 2020-06-24 NOTE — REASON FOR VISIT
[Home] : at home, [unfilled] , at the time of the visit. [Medical Office: ([facility])___] : at the medical office located in  [Mother] : mother [Other:____] : [unfilled] [Verbal consent obtained from patient] : the patient, [unfilled] [Follow-Up] : a follow-up visit for [Hypoplastic Left Heart Syndrome] : hypoplastic left heart syndrome [FreeTextEntry4] : Janelle Martinez, NP [FreeTextEntry3] : s/a staged repair of HLHS, s/p extracardiac Fontan

## 2020-06-24 NOTE — HISTORY OF PRESENT ILLNESS
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 From his mother's recollection his surgical history is summarized below:\par \par 1.	Bentley Operation, 1990, Dr. Bennett – [facility]\par 2.	Bidirectional Justice Operation, 1991, Dr. Bennett – [facility]\par 3.	Extracardiac Fontan, 1997, Dr. Bennett – [facility]\par 4.	Cardiac Catheterization, 1999 – device closure of his fenestration \par \par We saw Arie in 2016 and then was lost to followup. He has a history of non adherence to medical therapy.  He presented at [place] in August 2019 with SOB and lower extremity edema. He was treated with diuretics and improved significantly. Additionally he was treated for microcytic anemia. He was sent home on Digoxin, Lisinopril, Lasix and aspirin. Following his last visit with us in Jan 2020 he had 2 admissions (Jan, Feb) for lower extremity edema, abdominal distention and anemia. He required 4 units of PRBC's and underwent both upper endoscopy (negative) and colonoscopy where they found kenneth red blood in his colon but no site of active bleeding. he underwent capsule endoscopy which identified some angioectasias which was treated and clipped. From a cardiac standpoint he has been relatively stable. He denies chest pain, SOB, palpitations, peripheral edema, dizziness.\par \par Last week Miguel called with complaints of feeling bloated with some discomfort. His abdomen was swollen. He was having normal bowel movements that were brown and formed. He had received an iron infusion the week prior to the increase in his abdominal distention. We monitored his electrolytes and increased his Lasix to 80 mg am and 40 mg in the evening for 3 days. Follow up electrolytes were again normal yesterday prior to this Telehealth call. He reports that he feels significantly better over the past 2 days. His abdominal distention has decreased. He has no discomfort. He has decreased his Lasix back to 60mg daily. His ankles have no edema.\par \par

## 2020-06-24 NOTE — PHYSICAL EXAM
[General Appearance - Alert] : alert [Facies] : the head and face were normal in appearance [Demonstrated Behavior - Infant Nonreactive To Parents] : interactive [FreeTextEntry1] : no peripheral edema, multiple varicosities on ankles bilaterally

## 2020-06-29 DIAGNOSIS — R18.8 OTHER ASCITES: ICD-10-CM

## 2020-07-06 ENCOUNTER — APPOINTMENT (OUTPATIENT)
Dept: PEDIATRIC CARDIOLOGY | Facility: CLINIC | Age: 30
End: 2020-07-06
Payer: MEDICARE

## 2020-07-06 ENCOUNTER — INPATIENT (INPATIENT)
Facility: HOSPITAL | Age: 30
LOS: 9 days | Discharge: HOME | End: 2020-07-16
Attending: INTERNAL MEDICINE | Admitting: INTERNAL MEDICINE
Payer: MEDICARE

## 2020-07-06 VITALS
DIASTOLIC BLOOD PRESSURE: 67 MMHG | SYSTOLIC BLOOD PRESSURE: 114 MMHG | HEART RATE: 101 BPM | OXYGEN SATURATION: 92 % | TEMPERATURE: 98 F | RESPIRATION RATE: 18 BRPM

## 2020-07-06 VITALS
WEIGHT: 151.9 LBS | OXYGEN SATURATION: 88 % | HEART RATE: 90 BPM | RESPIRATION RATE: 20 BRPM | HEIGHT: 65.75 IN | SYSTOLIC BLOOD PRESSURE: 107 MMHG | BODY MASS INDEX: 24.71 KG/M2 | DIASTOLIC BLOOD PRESSURE: 76 MMHG

## 2020-07-06 DIAGNOSIS — Z98.890 OTHER SPECIFIED POSTPROCEDURAL STATES: Chronic | ICD-10-CM

## 2020-07-06 DIAGNOSIS — Q23.4 HYPOPLASTIC LEFT HEART SYNDROME: ICD-10-CM

## 2020-07-06 LAB
ALBUMIN SERPL ELPH-MCNC: 3.4 G/DL — SIGNIFICANT CHANGE UP (ref 3.3–5)
ALP SERPL-CCNC: 152 U/L — HIGH (ref 40–120)
ALT FLD-CCNC: 9 U/L — SIGNIFICANT CHANGE UP (ref 4–41)
ANION GAP SERPL CALC-SCNC: 12 MMO/L — SIGNIFICANT CHANGE UP (ref 7–14)
AST SERPL-CCNC: 13 U/L — SIGNIFICANT CHANGE UP (ref 4–40)
BASE EXCESS BLDV CALC-SCNC: 4.1 MMOL/L — SIGNIFICANT CHANGE UP
BASOPHILS # BLD AUTO: 0.08 K/UL — SIGNIFICANT CHANGE UP (ref 0–0.2)
BASOPHILS NFR BLD AUTO: 0.7 % — SIGNIFICANT CHANGE UP (ref 0–2)
BILIRUB SERPL-MCNC: 0.5 MG/DL — SIGNIFICANT CHANGE UP (ref 0.2–1.2)
BLOOD GAS VENOUS - CREATININE: 0.61 MG/DL — SIGNIFICANT CHANGE UP (ref 0.5–1.3)
BUN SERPL-MCNC: 13 MG/DL — SIGNIFICANT CHANGE UP (ref 7–23)
CALCIUM SERPL-MCNC: 9.1 MG/DL — SIGNIFICANT CHANGE UP (ref 8.4–10.5)
CHLORIDE BLDV-SCNC: 91 MMOL/L — LOW (ref 96–108)
CHLORIDE SERPL-SCNC: 87 MMOL/L — LOW (ref 98–107)
CO2 SERPL-SCNC: 25 MMOL/L — SIGNIFICANT CHANGE UP (ref 22–31)
CREAT SERPL-MCNC: 0.65 MG/DL — SIGNIFICANT CHANGE UP (ref 0.5–1.3)
DIGOXIN SERPL-MCNC: 0.7 NG/ML — LOW (ref 0.8–2)
EOSINOPHIL # BLD AUTO: 0.05 K/UL — SIGNIFICANT CHANGE UP (ref 0–0.5)
EOSINOPHIL NFR BLD AUTO: 0.5 % — SIGNIFICANT CHANGE UP (ref 0–6)
GAS PNL BLDV: 124 MMOL/L — LOW (ref 136–146)
GLUCOSE BLDV-MCNC: 101 MG/DL — HIGH (ref 70–99)
GLUCOSE SERPL-MCNC: 111 MG/DL — HIGH (ref 70–99)
HCO3 BLDV-SCNC: 28 MMOL/L — HIGH (ref 20–27)
HCT VFR BLD CALC: 36.8 % — LOW (ref 39–50)
HCT VFR BLDV CALC: 38.2 % — LOW (ref 39–51)
HGB BLD-MCNC: 12.3 G/DL — LOW (ref 13–17)
HGB BLDV-MCNC: 12.4 G/DL — LOW (ref 13–17)
IMM GRANULOCYTES NFR BLD AUTO: 0.6 % — SIGNIFICANT CHANGE UP (ref 0–1.5)
LACTATE BLDV-MCNC: 1.2 MMOL/L — SIGNIFICANT CHANGE UP (ref 0.5–2)
LYMPHOCYTES # BLD AUTO: 0.46 K/UL — LOW (ref 1–3.3)
LYMPHOCYTES # BLD AUTO: 4.2 % — LOW (ref 13–44)
MAGNESIUM SERPL-MCNC: 2 MG/DL — SIGNIFICANT CHANGE UP (ref 1.6–2.6)
MCHC RBC-ENTMCNC: 26.3 PG — LOW (ref 27–34)
MCHC RBC-ENTMCNC: 33.4 % — SIGNIFICANT CHANGE UP (ref 32–36)
MCV RBC AUTO: 78.8 FL — LOW (ref 80–100)
MONOCYTES # BLD AUTO: 1.03 K/UL — HIGH (ref 0–0.9)
MONOCYTES NFR BLD AUTO: 9.3 % — SIGNIFICANT CHANGE UP (ref 2–14)
NEUTROPHILS # BLD AUTO: 9.33 K/UL — HIGH (ref 1.8–7.4)
NEUTROPHILS NFR BLD AUTO: 84.7 % — HIGH (ref 43–77)
NRBC # FLD: 0 K/UL — SIGNIFICANT CHANGE UP (ref 0–0)
NT-PROBNP SERPL-SCNC: 772.7 PG/ML — SIGNIFICANT CHANGE UP
PCO2 BLDV: 37 MMHG — LOW (ref 41–51)
PH BLDV: 7.49 PH — HIGH (ref 7.32–7.43)
PLATELET # BLD AUTO: 404 K/UL — HIGH (ref 150–400)
PMV BLD: 9.7 FL — SIGNIFICANT CHANGE UP (ref 7–13)
PO2 BLDV: 39 MMHG — SIGNIFICANT CHANGE UP (ref 35–40)
POTASSIUM BLDV-SCNC: 3.9 MMOL/L — SIGNIFICANT CHANGE UP (ref 3.4–4.5)
POTASSIUM SERPL-MCNC: 4.4 MMOL/L — SIGNIFICANT CHANGE UP (ref 3.5–5.3)
POTASSIUM SERPL-SCNC: 4.4 MMOL/L — SIGNIFICANT CHANGE UP (ref 3.5–5.3)
PROT SERPL-MCNC: 6.6 G/DL — SIGNIFICANT CHANGE UP (ref 6–8.3)
RBC # BLD: 4.67 M/UL — SIGNIFICANT CHANGE UP (ref 4.2–5.8)
RBC # FLD: 17.4 % — HIGH (ref 10.3–14.5)
SAO2 % BLDV: 71.5 % — SIGNIFICANT CHANGE UP (ref 60–85)
SARS-COV-2 RNA SPEC QL NAA+PROBE: SIGNIFICANT CHANGE UP
SODIUM SERPL-SCNC: 124 MMOL/L — LOW (ref 135–145)
TROPONIN T, HIGH SENSITIVITY: 11 NG/L — SIGNIFICANT CHANGE UP (ref ?–14)
WBC # BLD: 11.02 K/UL — HIGH (ref 3.8–10.5)
WBC # FLD AUTO: 11.02 K/UL — HIGH (ref 3.8–10.5)

## 2020-07-06 PROCEDURE — 93320 DOPPLER ECHO COMPLETE: CPT

## 2020-07-06 PROCEDURE — 71046 X-RAY EXAM CHEST 2 VIEWS: CPT | Mod: 26

## 2020-07-06 PROCEDURE — 93325 DOPPLER ECHO COLOR FLOW MAPG: CPT

## 2020-07-06 PROCEDURE — 93303 ECHO TRANSTHORACIC: CPT

## 2020-07-06 PROCEDURE — 99215 OFFICE O/P EST HI 40 MIN: CPT | Mod: 25,PD

## 2020-07-06 PROCEDURE — 99291 CRITICAL CARE FIRST HOUR: CPT

## 2020-07-06 PROCEDURE — 93000 ELECTROCARDIOGRAM COMPLETE: CPT | Mod: PD

## 2020-07-06 PROCEDURE — 99223 1ST HOSP IP/OBS HIGH 75: CPT | Mod: GC

## 2020-07-06 RX ORDER — MILRINONE LACTATE 1 MG/ML
0.12 INJECTION, SOLUTION INTRAVENOUS
Qty: 20 | Refills: 0 | Status: DISCONTINUED | OUTPATIENT
Start: 2020-07-06 | End: 2020-07-15

## 2020-07-06 RX ORDER — HEPARIN SODIUM 5000 [USP'U]/ML
5000 INJECTION INTRAVENOUS; SUBCUTANEOUS EVERY 8 HOURS
Refills: 0 | Status: DISCONTINUED | OUTPATIENT
Start: 2020-07-06 | End: 2020-07-13

## 2020-07-06 RX ORDER — CHLORHEXIDINE GLUCONATE 213 G/1000ML
1 SOLUTION TOPICAL
Refills: 0 | Status: DISCONTINUED | OUTPATIENT
Start: 2020-07-06 | End: 2020-07-16

## 2020-07-06 RX ORDER — BUMETANIDE 0.25 MG/ML
2 INJECTION INTRAMUSCULAR; INTRAVENOUS EVERY 12 HOURS
Refills: 0 | Status: DISCONTINUED | OUTPATIENT
Start: 2020-07-06 | End: 2020-07-07

## 2020-07-06 RX ORDER — BUMETANIDE 0.25 MG/ML
1 INJECTION INTRAMUSCULAR; INTRAVENOUS EVERY 12 HOURS
Refills: 0 | Status: DISCONTINUED | OUTPATIENT
Start: 2020-07-06 | End: 2020-07-06

## 2020-07-06 RX ORDER — BUMETANIDE 0.25 MG/ML
1 INJECTION INTRAMUSCULAR; INTRAVENOUS ONCE
Refills: 0 | Status: COMPLETED | OUTPATIENT
Start: 2020-07-06 | End: 2020-07-06

## 2020-07-06 RX ADMIN — HEPARIN SODIUM 5000 UNIT(S): 5000 INJECTION INTRAVENOUS; SUBCUTANEOUS at 21:54

## 2020-07-06 RX ADMIN — BUMETANIDE 1 MILLIGRAM(S): 0.25 INJECTION INTRAMUSCULAR; INTRAVENOUS at 19:28

## 2020-07-06 RX ADMIN — MILRINONE LACTATE 2.6 MICROGRAM(S)/KG/MIN: 1 INJECTION, SOLUTION INTRAVENOUS at 19:27

## 2020-07-06 NOTE — ED PROVIDER NOTE - PROGRESS NOTE DETAILS
Brady Chopra, PGY 3: micu aware and will come eval the patient spoke with ped cards and made aware. MD CHO:  I received s/o on this pt from Dr. Sousa.  HLHS s/p Fontan procedure with signs of chf.  To be admitted directly to MICU.  I spoke with MICU resident, Tariq Abernathy, he is at bedside and accepts the patient to their service.

## 2020-07-06 NOTE — CONSULT NOTE ADULT - ASSESSMENT
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     Etiology of patient's worsening ascites likely from congestive hepatopathy and subsequent cirrhosis. However difficult to determine whether ascites is entirely attributed to heart failure, or whether prolonged liver damage and cirrhosis have led to independent problem. Additionally, will test for chronic liver conditions.     Recommendations:  - paracentesis, please send cell count, albumin, protein, cultures  - serologies: HAV, HBVsAb, HBVsAg, HCV Ab, ceruloplasm, alpha-1 antitrypsin, AFP, iron sat/ferritin/TIBC, immunoglobulin panel (IgG, IgA, IgM), MICHELLE, anti-smooth muscle, anti-mitochondrial, Anti-liver kidney  - imaging: RUQ US w/ doppler to r/o PVT  - patient would benefit from evaluation of fibrosis, likely with MR elastography, which can be done as outpatient. Additionally, patient will need imaging for liver mass lesion, as most recent abdominal imaging is CT from 8/2019. Will start with US as described above, but if patient gets MR elastography as outpatient, would recommend getting MRI liver at same time  - if patient goes for [facility], would consider doing wedged hepatic venous pressure, to further elucidate whether ascites is purely from cardiac pressures or whether independent portal pressures are contributing  - diuretics management per MICU/cardiology    Thank you for this interesting consult. Hepatology will continue to follow.     Sylvester Mccray, PGY4  Gastroenterology Fellow  Available on Microsoft Teams  [phone] (Long Range Pager)    After 5pm, please contact the on-call GI fellow. [phone] 28 yo M w/ PMHx hypoplastic L heart s/p José procedure, GIB (1/2020) presenting w/ acute on chronic ADHF and worsening ascites, hepatology consulted for ascites management.     Etiology of patient's worsening ascites likely from congestive hepatopathy and subsequent cirrhosis. However difficult to determine whether ascites is entirely attributed to heart failure, or whether prolonged liver damage and cirrhosis have led to independent problem. Additionally, will test for chronic liver conditions.     Recommendations:  - paracentesis, please send cell count, albumin, protein, cultures  - serologies: HAV, HBVsAb, HBVsAg, HCV Ab, ceruloplasm, alpha-1 antitrypsin, AFP, iron sat/ferritin/TIBC, immunoglobulin panel (IgG, IgA, IgM), MICHELLE, anti-smooth muscle, anti-mitochondrial, Anti-liver kidney microsomal antibody  - imaging: RUQ US w/ doppler to r/o PVT / HVT  - patient would benefit from evaluation of fibrosis, likely with MR elastography, which can be done as outpatient. Additionally, patient will need imaging for liver mass lesion, as most recent abdominal imaging is CT from 8/2019. Will start with US as described above, but if patient gets MR elastography as outpatient, would recommend getting MRI liver at same time  - if patient goes for cardiac catheterization, would consider doing wedged hepatic venous pressure, to further elucidate whether ascites is purely from cardiac pressures or whether independent portal pressures are contributing  - diuretics management per MICU/cardiology    Thank you for this interesting consult. Hepatology will continue to follow.     Sylvester Mccray, PGY4  Gastroenterology Fellow  Available on Microsoft Teams  [phone] (Long Range Pager)    After 5pm, please contact the on-call GI fellow. [phone]

## 2020-07-06 NOTE — ED PROVIDER NOTE - ATTENDING CONTRIBUTION TO CARE
Attending MD Sousa. 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 Now wiht failing fontane. Now wiht acute exacerbation of heart failure. Also has abdominal ascites. Pt normally desats to 88% at baseline. In office pt's sat was 88% which is his baseline. Do not escalate based on these sats. Avoid intubation for these sats. Attendings (adult congenital team) talking to MICU for pt to be accepted for MICU admission. Don't call CCU/cardiology. Discuss with MICU because pt's cardiology team preference. On digoxin.    Received call from [facility]. Madera ([facility]) with above info, pt has been seeing Dr. Hernandez and Janelle Martinez is pt's nurse practitioner. Can page Dr. Madera or pepito estrella fellow pager # [phone]. Attending MD Sousa. 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 Now wiht failing fontane. Now wiht acute exacerbation of heart failure. Also has abdominal ascites. Pt normally desats to 88% at baseline. In office pt's sat was 88% which is his baseline. Do not escalate based on these sats. Avoid intubation for these sats. Attendings (adult congenital team) talking to MICU for pt to be accepted for MICU admission. Don't call CCU/cardiology. Discuss with MICU because pt's cardiology team preference. On digoxin.    Received call from Dr. Madera ([facility]) with above info, pt has been seeing Dr. Hernandez and Janelle Martinez is pt's nurse practitioner. Can page Dr. Madera or pepito estrella fellow pager # [phone].    Pt evaluated on arrival to ED and feeling well at rest.  Breath sounds clear with reduction in L basilar breath sounds without crackles/evidence of pulmonary fluid overload.  Pt with bilateral LE edema and abdominal distention.  Pt signed out to incoming team pending MUC acceptance in stable condition.  O2 sat 93%.

## 2020-07-06 NOTE — ED ADULT NURSE NOTE - OBJECTIVE STATEMENT
Pt aa&ox4 PMH left sided heart failure presenting for exacerbation. Pt was sent by cardiologist. Pt abdomen noted to be swollen. Pt states this has happened in the past but this is worse than usual. Pt on Lasix and Bumex but states it is not helping. Pt denies trouble breathing/ chest pain. Pt states he is chronically hypoxic, pt sating at 94% in ED. Pt placed on monitor, NSR noted. 20g IV placed in RT AC, labs sent. Will monitor.

## 2020-07-06 NOTE — ED PROVIDER NOTE - PHYSICAL EXAMINATION
General: NAD, good hygiene, well developed  HENT: Atraumatic, EOMI, no conjunctivae injection, moist mucosa, no post. oropharynx erythema, exudates  Neck: normal ROM and trachea midline   Cardiovascular: RRR, S1&2, no M or R, radial pulses equal and b/l  Respiratory: CTABL, no wheezes or crackles, no decreased breath sounds  Abdominal: soft and non-tender non distended, neg for guarding, duong's sign, rovsing's sign, mcburney's sign, no CVA tenderness   Extremities: no edema of the legs/feet, DP/PT equal b/l  Skin: warm, well perfused  Neurologic: nonfocal, AAOx3  Psych: normal mood and affect

## 2020-07-06 NOTE — ED PROVIDER NOTE - CRTICAL CARE TIME SPENT (MIN)
6598  Anesthesia staff at patient's bedside administering anesthesia and monitoring patients vital signs throughout procedure. See anesthesia note.    Kelly Shone was pre-cleaned at bedside immediately following procedure by Scotty Francis endo tech. [id number]  Patient tolerated procedure without problems. Abdomen soft and patient arousable and voices no complaints Report received from CRNA, see anesthesia note. Patient transported to endoscopy recovery area. Report given to Nettie Tompkins RN. Rapid H Pylori obtained. 45

## 2020-07-06 NOTE — ED PROVIDER NOTE - CARE PLAN
Principal Discharge DX:	HLHS (hypoplastic left heart syndrome)  Secondary Diagnosis:	S/P Fontan procedure  Secondary Diagnosis:	CHF (congestive heart failure)

## 2020-07-06 NOTE — ED PROVIDER NOTE - CRITICAL CARE PROVIDED
direct patient care (not related to procedure)/consultation with other physicians/additional history taking

## 2020-07-06 NOTE — CONSULT NOTE ADULT - SUBJECTIVE AND OBJECTIVE BOX
Chief Complaint:  Patient is a 29y old  Male who presents with a chief complaint of Acute decompensated heart failure (2020 16:06)      HPI: Mr. Acevedo is 28 yo M w/ PMHx hypoplastic L heart s/p José procedure, GIB, admitted for decompensated heart failure and worsening ascites. Hepatology consulted for ascites.     Pt is s/p José procedure, for which he follows with cardiology. He had been doing well for many years, until the past several months he started to develop ascites. Ascites treated w/ increasing diuretics, initially lasix/aldactone, and then bumex was added. Over past few days, patient with significantly worsening abdominal distention, orthopnea, +10 lbs in 1 week, so presented to outpt cardiology clinic, who recommended admission for IV diuresis.     Patient also w/ recent GIB. Was admitted 20-20 for melena, s/p 4u pRBC but left AMA prior to endoscopic evaluation. Represented two days later (2/3/20), underwent EGD/colon/capsule remarkable for some duodenal bleeding, and f/u EGD 3/3/20 also w/ angioectasias in D2. At that time, Hg 6.9 so pt admitted for transfusion.     Pt denies EtOH use, no known history of hepatitis, no IVDU, no recent travel.     Allergies:  No Known Allergies      Home Medications:  - aldactone  - bumex  - lasix  - lisinopril        PMHX/PSHX:  Anxiety  Melena  Anemia  HLHS (hypoplastic left heart syndrome)  H/O cardiac catheterization  Status post bidirectional Justice operation  S/P Renea operation  S/P Fontan procedure      Family history:  Family history of neoplasm of uncertain behavior of connective and other soft tissue      Denies family history of colon cancer/polyps, stomach cancer/polyps, pancreatic cancer/masses, liver cancer/disease, ovarian cancer and endometrial cancer.    Social History:     Tob: Denies  EtOH: Denies  Illicit Drugs: Denies    ROS:     General:  + weight gain  Eyes:  Good vision, no reported pain  ENT:  No sore throat, pain, runny nose, dysphagia  CV:  No pain, palpitations, hypo/hypertension  Pulm:  + orthopnea  GI:  see above  :  No pain, bleeding, incontinence, nocturia  Muscle:  No pain, weakness  Neuro:  No weakness, tingling, memory problems  Psych:  No fatigue, insomnia, mood problems, depression  Endocrine:  No polyuria, polydipsia, cold/heat intolerance  Heme:  No petechiae, ecchymosis, easy bruisability  Skin:  No rash, tattoos, scars, edema    PHYSICAL EXAM:     GENERAL:  No acute distress  HEENT:  Normocephalic/atraumatic, no scleral icterus  CHEST:  Clear to auscultation bilaterally, no wheezes/rales/ronchi, no accessory muscle use  HEART:  Regular rate and rhythm, no murmurs/rubs/gallops  ABDOMEN:  distended, soft, +BS, + hepatomegaly  EXTREMITIES: No cyanosis, clubbing, or edema  SKIN:  chronic LE vascular congestion  NEURO:  Alert and oriented x 3, no asterixis    Vital Signs:  Vital Signs Last 24 Hrs  T(C): 36.8 (2020 18:43), Max: 36.9 (2020 14:48)  T(F): 98.3 (2020 18:43), Max: 98.5 (2020 14:48)  HR: 92 (2020 18:43) (92 - 101)  BP: 105/64 (2020 18:43) (105/64 - 114/67)  BP(mean): 69 (2020 18:43) (69 - 69)  RR: 18 (2020 18:43) (18 - 25)  SpO2: 92% (2020 18:43) (92% - 94%)  Daily     Daily Weight in k.2 (2020 18:43)    LABS:  CBC: 11>12.3 (baseline 8-9s)<404  BMP: 124/4.4/87/25/13/0.6/111  LFT:  | 152 | 0.5 | 6.6/3.4    Prior GI procedures:  EGD 2020  Findings:   The examined esophagus was normal.   The Z-line was regular and was found 39 cm from the incisors.   The entire examined stomach was normal. Biopsies were taken with a cold    forceps for Helicobacter pylori testing. (PATH: HP NEGATIVE)   The duodenal bulb was normal.   The 2nd part of the duodenum was normal. Bilious fluid was seen    throughout the duodenum.   There was mild friability and self-limited blood oozing in the first    part of the duodenum after endoscope withdrawal. No underlying lesion    noted. Likely secondary trauma from passage of endoscope.     Colonoscopy 2020  Findings:   The perianal and digital rectal examinations were normal.   A 3 mm polyp was found in the sigmoid colon. The polyp was sessile. The    polyp was removed with a cold snare. Resection and retrieval were    complete. (PATH: HP)   Bilious fluid with blood-tinged appearance was found in the ascending    colon and cecum. No underlying lesion was seen. No active bleeding was    seen.   The terminal ileum appeared normal. Dark brown stool was seen in the    terminal ileum.    VCE 2020:   Gastric passage time: 16h  Small bowel passage time: 2h 6m  Findings:   Esophagus: grossly normal  Stomach: Small clean based gastric ulcer, possibly from recent biopsy site  Duodenum: Proximal duodenum (d@) noted for patchy erythema and fresh blood. There was no additional blood or underlying lesions seen in the remainder of the small bowel, though visualization was limited by prep quality.   Colon: Poor visualization 2/2 prep quality, but scant blood was noted Chief Complaint:  Patient is a 29y old  Male who presents with a chief complaint of Acute decompensated heart failure (2020 16:06)      HPI: Mr. Acevedo is 28 yo M w/ PMHx hypoplastic L heart s/p Fontan procedure, GIB, admitted for decompensated heart failure and worsening ascites. Hepatology consulted for ascites.     Pt is s/p Fontan procedure, for which he follows with cardiology. He had been doing well for many years, until the past several months he started to develop ascites. Ascites treated w/ increasing diuretics, initially lasix/aldactone, and then bumex was added. Over past few days, patient with significantly worsening abdominal distention, orthopnea, +10 lbs in 1 week, so presented to outpt cardiology clinic, who recommended admission for IV diuresis.     Patient also w/ recent GIBleed. Was admitted 20-20 for melena, s/p 4u pRBC but left AMA prior to endoscopic evaluation. Represented two days later (2/3/20), underwent EGD/colon/capsule remarkable for some duodenal bleeding, and f/u EGD 3/3/20 also w/ angioectasias in D2. At that time, Hg 6.9 so pt admitted for transfusion.     Pt denies EtOH use, no known history of hepatitis, no IVDU, no recent travel.     Allergies:  No Known Allergies      Home Medications:  - aldactone  - bumex  - lasix  - lisinopril        PMHX/PSHX:  Anxiety  Melena  Anemia  HLHS (hypoplastic left heart syndrome)  H/O cardiac catheterization  Status post bidirectional Justice operation  S/P Renea operation  S/P Fontan procedure      Family history:  Family history of neoplasm of uncertain behavior of connective and other soft tissue      Denies family history of colon cancer/polyps, stomach cancer/polyps, pancreatic cancer/masses, liver cancer/disease, ovarian cancer and endometrial cancer.    Social History:     Tob: Denies  EtOH: Denies  Illicit Drugs: Denies    ROS:     General:  + weight gain  Eyes:  Good vision, no reported pain  ENT:  No sore throat, pain, runny nose, dysphagia  CV:  No pain, palpitations, hypo/hypertension  Pulm:  + orthopnea  GI:  see above  :  No pain, bleeding, incontinence, nocturia  Muscle:  No pain, weakness  Neuro:  No weakness, tingling, memory problems  Psych:  No fatigue, insomnia, mood problems, depression  Endocrine:  No polyuria, polydipsia, cold/heat intolerance  Heme:  No petechiae, ecchymosis, easy bruisability  Skin:  No rash, tattoos, scars, edema    PHYSICAL EXAM:     GENERAL:  No acute distress  HEENT:  Normocephalic/atraumatic, no scleral icterus  CHEST:  Clear to auscultation bilaterally, no wheezes/rales/ronchi, no accessory muscle use  HEART:  Regular rate and rhythm, no murmurs/rubs/gallops  ABDOMEN:  distended, soft, +BS, + hepatomegaly  EXTREMITIES: No cyanosis, clubbing, or edema  SKIN:  chronic LE vascular congestion  NEURO:  Alert and oriented x 3, no asterixis    Vital Signs:  Vital Signs Last 24 Hrs  T(C): 36.8 (2020 18:43), Max: 36.9 (2020 14:48)  T(F): 98.3 (2020 18:43), Max: 98.5 (2020 14:48)  HR: 92 (2020 18:43) (92 - 101)  BP: 105/64 (2020 18:43) (105/64 - 114/67)  BP(mean): 69 (2020 18:43) (69 - 69)  RR: 18 (2020 18:43) (18 - 25)  SpO2: 92% (2020 18:43) (92% - 94%)  Daily     Daily Weight in k.2 (2020 18:43)    LABS:  CBC: 11>12.3 (baseline 8-9s)<404  BMP: 124/4.4/87/25/13/0.6/111  LFT:  | 152 | 0.5 | 6.6/3.4    Prior GI procedures:  EGD 2020  Findings:   The examined esophagus was normal.   The Z-line was regular and was found 39 cm from the incisors.   The entire examined stomach was normal. Biopsies were taken with a cold    forceps for Helicobacter pylori testing. (PATH: HP NEGATIVE)   The duodenal bulb was normal.   The 2nd part of the duodenum was normal. Bilious fluid was seen    throughout the duodenum.   There was mild friability and self-limited blood oozing in the first    part of the duodenum after endoscope withdrawal. No underlying lesion    noted. Likely secondary trauma from passage of endoscope.     Colonoscopy 2020  Findings:   The perianal and digital rectal examinations were normal.   A 3 mm polyp was found in the sigmoid colon. The polyp was sessile. The    polyp was removed with a cold snare. Resection and retrieval were    complete. (PATH: HP)   Bilious fluid with blood-tinged appearance was found in the ascending    colon and cecum. No underlying lesion was seen. No active bleeding was    seen.   The terminal ileum appeared normal. Dark brown stool was seen in the    terminal ileum.    VCE 2020:   Gastric passage time: 16h  Small bowel passage time: 2h 6m  Findings:   Esophagus: grossly normal  Stomach: Small clean based gastric ulcer, possibly from recent biopsy site  Duodenum: Proximal duodenum (d@) noted for patchy erythema and fresh blood. There was no additional blood or underlying lesions seen in the remainder of the small bowel, though visualization was limited by prep quality.   Colon: Poor visualization 2/2 prep quality, but scant blood was noted

## 2020-07-06 NOTE — ED PROVIDER NOTE - OBJECTIVE STATEMENT
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 Now with failing fontane. ped cardiologist is concerning for acute exacerbation of heart failure. Also has abdominal ascites. Pt normally desats to 88% at baseline. In office pt's sat was 88% which is his baseline. Do not escalate based on these sats. Avoid intubation for these sats. Attendings (adult congenital team) talking to MICU for pt to be accepted for MICU admission. Don't call CCU/cardiology. Discuss with MICU because pt's cardiology team preference. On digoxin.

## 2020-07-06 NOTE — REASON FOR VISIT
[Hypoplastic Left Heart Syndrome] : hypoplastic left heart syndrome [Follow-Up] : a follow-up visit for [Mother] : mother [Patient] : patient

## 2020-07-06 NOTE — H&P ADULT - ASSESSMENT
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  Sent from clinic for IV diuresis and milrinone therapy.      #Neuro    #Cardiovascular  Acute Decompensated Heart Failure   - Sent by [facility] for diuresis and milrinone therapy   - Will start diuresis with Bumex gtt and home aldactone   - Start milrinone gtt   - Continue home digoxin   - HOLD home lisinopril per Congenital Heart team   - Paracentesis to improve breathing mechanics; abdomen distended and tympanic   - C arrangements per Peds Congenital Heart team    #Respiratory   - Patient baseline sat 88%   - Will try to avoid intubation if possible; he would be very high risk due to his pulmonary hypertension    #Gastrointestinal  Ascites   - Likely secondary to heart failure vs cirrhosis   - Will do paracentesis, send studies including SAAG and culture  Gastritis   - Continue home protonix   - History of GI bleed    #Renal   - Will diurese with Bumex gtt and aldactone   - Baseline Cr appears to be ~0.8   -     #Infectious Disease   - No active issues    #Heme  Anemia   - Has baseline anemia ~8.0

## 2020-07-06 NOTE — H&P ADULT - NSHPPHYSICALEXAM_GEN_ALL_CORE
ICU Vital Signs Last 24 Hrs  T(C): 36.9 (06 Jul 2020 14:48), Max: 36.9 (06 Jul 2020 14:48)  T(F): 98.5 (06 Jul 2020 14:48), Max: 98.5 (06 Jul 2020 14:48)  HR: 96 (06 Jul 2020 16:09) (96 - 101)  BP: 113/62 (06 Jul 2020 16:09) (113/62 - 114/67)  BP(mean): --  ABP: --  ABP(mean): --  RR: 25 (06 Jul 2020 16:09) (18 - 25)  SpO2: 94% (06 Jul 2020 16:09) (92% - 94%)    PHYSICAL EXAM: **** pending  GENERAL: NAD, conversant  CHEST/LUNG: Clear to auscultation bilaterally; No crackles, rhonchi, wheezing, or rubs  HEART: Regular rate and rhythm; No murmurs, rubs, or gallops  ABDOMEN: Soft, Nontender, Nondistended; Bowel sounds present  EXTREMITIES:  2+ Peripheral Pulses, No clubbing, cyanosis, or edema  SKIN: No rashes or lesions  NERVOUS SYSTEM:  Alert & Oriented, Good concentration

## 2020-07-06 NOTE — ED CLERICAL - NS ED CLERK NOTE PRE-ARRIVAL INFORMATION; ADDITIONAL PRE-ARRIVAL INFORMATION
Pt being sent in for worsening heart failure abdominal ascites Pt has hx of hypoplastic left heart syndrome. 02sat baseline of pt is 88% as per MD above.

## 2020-07-06 NOTE — H&P ADULT - NSHPLABSRESULTS_GEN_ALL_CORE
12.3   11.02 )-----------( 404      ( 06 Jul 2020 15:57 )             36.8     CAPILLARY BLOOD GLUCOSE

## 2020-07-06 NOTE — CONSULT NOTE ADULT - ATTENDING COMMENTS
This patient has hypoplastic left heart syndrome surgically corrected. Has noted development of ascites over last several months with control with the use of diuretics until the last month when he noted increasing abdominal swelling. No prior history of liver disease. No significant alcohol intake. Patient has had CT scan 8/2019 showing nodular liver with mild splenomegaly and no liver mass lesions.  Had EGD 3/2020 with no evidence of esophageal varices.  Now with marked ascites no fever or abdominal tenderness.    Will assess for other possible causes of chronic liver disease, but ascites likely related to chronic passive congestion of liver related to heart failure.  Assessment of hepatic fibrosis could start with noninvasive measures with MR elastography and MRI w/wo contrast as outpatient .  Would get abd ultrasound with doppler assessment of hepatic/portal veins and this may also assess for liver mass lesion.      If patient to have cardiac cath, measurement of hepatic venous pressure gradient would be informative.    Await response to planned therapeutic initiatives in MICU.

## 2020-07-07 ENCOUNTER — RESULT REVIEW (OUTPATIENT)
Age: 30
End: 2020-07-07

## 2020-07-07 LAB
A1AT SERPL-MCNC: 270 MG/DL — HIGH (ref 90–200)
ALBUMIN FLD-MCNC: 2.2 G/DL — SIGNIFICANT CHANGE UP
ALBUMIN SERPL ELPH-MCNC: 2.9 G/DL — LOW (ref 3.3–5)
ALP SERPL-CCNC: 133 U/L — HIGH (ref 40–120)
ALT FLD-CCNC: 9 U/L — SIGNIFICANT CHANGE UP (ref 4–41)
ANION GAP SERPL CALC-SCNC: 14 MMO/L — SIGNIFICANT CHANGE UP (ref 7–14)
APTT BLD: 31.4 SEC — SIGNIFICANT CHANGE UP (ref 27–36.3)
AST SERPL-CCNC: 13 U/L — SIGNIFICANT CHANGE UP (ref 4–40)
BASOPHILS # BLD AUTO: 0.03 K/UL — SIGNIFICANT CHANGE UP (ref 0–0.2)
BASOPHILS NFR BLD AUTO: 0.3 % — SIGNIFICANT CHANGE UP (ref 0–2)
BILIRUB SERPL-MCNC: 0.6 MG/DL — SIGNIFICANT CHANGE UP (ref 0.2–1.2)
BODY FLUID TYPE: SIGNIFICANT CHANGE UP
BUN SERPL-MCNC: 11 MG/DL — SIGNIFICANT CHANGE UP (ref 7–23)
CALCIUM SERPL-MCNC: 8.7 MG/DL — SIGNIFICANT CHANGE UP (ref 8.4–10.5)
CHLORIDE SERPL-SCNC: 89 MMOL/L — LOW (ref 98–107)
CLARITY SPEC: SIGNIFICANT CHANGE UP
CO2 SERPL-SCNC: 25 MMOL/L — SIGNIFICANT CHANGE UP (ref 22–31)
COLOR FLD: SIGNIFICANT CHANGE UP
CREAT SERPL-MCNC: 0.65 MG/DL — SIGNIFICANT CHANGE UP (ref 0.5–1.3)
EOSINOPHIL # BLD AUTO: 0.05 K/UL — SIGNIFICANT CHANGE UP (ref 0–0.5)
EOSINOPHIL NFR BLD AUTO: 0.6 % — SIGNIFICANT CHANGE UP (ref 0–6)
FERRITIN SERPL-MCNC: 254.1 NG/ML — SIGNIFICANT CHANGE UP (ref 30–400)
GLUCOSE FLD-MCNC: 113 MG/DL — SIGNIFICANT CHANGE UP
GLUCOSE SERPL-MCNC: 135 MG/DL — HIGH (ref 70–99)
GRAM STN FLD: SIGNIFICANT CHANGE UP
HAV IGG SER QL IA: REACTIVE — HIGH
HAV IGM SER-ACNC: NONREACTIVE — SIGNIFICANT CHANGE UP
HBV SURFACE AB SER-ACNC: REACTIVE — HIGH
HBV SURFACE AG SER-ACNC: NEGATIVE — SIGNIFICANT CHANGE UP
HCT VFR BLD CALC: 35 % — LOW (ref 39–50)
HCV AB S/CO SERPL IA: 0.18 S/CO — SIGNIFICANT CHANGE UP (ref 0–0.99)
HCV AB SERPL-IMP: SIGNIFICANT CHANGE UP
HGB BLD-MCNC: 11.2 G/DL — LOW (ref 13–17)
IGA FLD-MCNC: 130 MG/DL — SIGNIFICANT CHANGE UP (ref 70–400)
IGG FLD-MCNC: 951 MG/DL — SIGNIFICANT CHANGE UP (ref 700–1600)
IGM SERPL-MCNC: 41 MG/DL — SIGNIFICANT CHANGE UP (ref 40–230)
IMM GRANULOCYTES NFR BLD AUTO: 0.6 % — SIGNIFICANT CHANGE UP (ref 0–1.5)
INR BLD: 1.36 — HIGH (ref 0.88–1.17)
IRON SATN MFR SERPL: 20 UG/DL — LOW (ref 45–165)
IRON SATN MFR SERPL: 205 UG/DL — SIGNIFICANT CHANGE UP (ref 155–535)
LDH SERPL L TO P-CCNC: 116 U/L — SIGNIFICANT CHANGE UP
LYMPHOCYTES # BLD AUTO: 0.38 K/UL — LOW (ref 1–3.3)
LYMPHOCYTES # BLD AUTO: 4.3 % — LOW (ref 13–44)
LYMPHOCYTES NFR FLD: 4 % — SIGNIFICANT CHANGE UP
MACROPHAGES # FLD: 10 % — SIGNIFICANT CHANGE UP
MAGNESIUM SERPL-MCNC: 2 MG/DL — SIGNIFICANT CHANGE UP (ref 1.6–2.6)
MCHC RBC-ENTMCNC: 25.3 PG — LOW (ref 27–34)
MCHC RBC-ENTMCNC: 32 % — SIGNIFICANT CHANGE UP (ref 32–36)
MCV RBC AUTO: 79 FL — LOW (ref 80–100)
MESOTHL CELL # FLD: 3 % — SIGNIFICANT CHANGE UP
MONOCYTES # BLD AUTO: 0.59 K/UL — SIGNIFICANT CHANGE UP (ref 0–0.9)
MONOCYTES NFR BLD AUTO: 6.7 % — SIGNIFICANT CHANGE UP (ref 2–14)
NEUTROPHILS # BLD AUTO: 7.66 K/UL — HIGH (ref 1.8–7.4)
NEUTROPHILS NFR BLD AUTO: 87.5 % — HIGH (ref 43–77)
NEUTS SEG NFR FLD MANUAL: 83 % — SIGNIFICANT CHANGE UP
NRBC # FLD: 0 K/UL — SIGNIFICANT CHANGE UP (ref 0–0)
PHOSPHATE SERPL-MCNC: 3.6 MG/DL — SIGNIFICANT CHANGE UP (ref 2.5–4.5)
PLATELET # BLD AUTO: 319 K/UL — SIGNIFICANT CHANGE UP (ref 150–400)
PMV BLD: 9.1 FL — SIGNIFICANT CHANGE UP (ref 7–13)
POTASSIUM SERPL-MCNC: 4 MMOL/L — SIGNIFICANT CHANGE UP (ref 3.5–5.3)
POTASSIUM SERPL-SCNC: 4 MMOL/L — SIGNIFICANT CHANGE UP (ref 3.5–5.3)
PROT FLD-MCNC: 3.6 G/DL — SIGNIFICANT CHANGE UP
PROT SERPL-MCNC: 5.7 G/DL — LOW (ref 6–8.3)
PROTHROM AB SERPL-ACNC: 15.8 SEC — HIGH (ref 9.8–13.1)
RBC # BLD: 4.43 M/UL — SIGNIFICANT CHANGE UP (ref 4.2–5.8)
RBC # FLD: 17.3 % — HIGH (ref 10.3–14.5)
RCV VOL RI: HIGH CELL/UL (ref 0–5)
SODIUM SERPL-SCNC: 128 MMOL/L — LOW (ref 135–145)
SPECIMEN SOURCE: SIGNIFICANT CHANGE UP
TOTAL CELLS COUNTED, BODY FLUID: 100 CELLS — SIGNIFICANT CHANGE UP
TOTAL NUCLEATED CELL COUNT, BODY FLUID: 1522 CELL/UL — HIGH (ref 0–5)
TROPONIN T, HIGH SENSITIVITY: 11 NG/L — SIGNIFICANT CHANGE UP (ref ?–14)
UIBC SERPL-MCNC: 184.5 UG/DL — SIGNIFICANT CHANGE UP (ref 110–370)
WBC # BLD: 8.76 K/UL — SIGNIFICANT CHANGE UP (ref 3.8–10.5)
WBC # FLD AUTO: 8.76 K/UL — SIGNIFICANT CHANGE UP (ref 3.8–10.5)

## 2020-07-07 PROCEDURE — 88112 CYTOPATH CELL ENHANCE TECH: CPT | Mod: 26

## 2020-07-07 PROCEDURE — 99291 CRITICAL CARE FIRST HOUR: CPT | Mod: 25

## 2020-07-07 PROCEDURE — 99232 SBSQ HOSP IP/OBS MODERATE 35: CPT

## 2020-07-07 PROCEDURE — 93975 VASCULAR STUDY: CPT | Mod: 26

## 2020-07-07 PROCEDURE — 99233 SBSQ HOSP IP/OBS HIGH 50: CPT | Mod: GC

## 2020-07-07 PROCEDURE — 88305 TISSUE EXAM BY PATHOLOGIST: CPT | Mod: 26

## 2020-07-07 PROCEDURE — 49083 ABD PARACENTESIS W/IMAGING: CPT | Mod: GC

## 2020-07-07 RX ORDER — DIGOXIN 250 MCG
0.12 TABLET ORAL DAILY
Refills: 0 | Status: DISCONTINUED | OUTPATIENT
Start: 2020-07-07 | End: 2020-07-15

## 2020-07-07 RX ORDER — BUMETANIDE 0.25 MG/ML
2 INJECTION INTRAMUSCULAR; INTRAVENOUS EVERY 8 HOURS
Refills: 0 | Status: DISCONTINUED | OUTPATIENT
Start: 2020-07-07 | End: 2020-07-07

## 2020-07-07 RX ORDER — BUMETANIDE 0.25 MG/ML
1 INJECTION INTRAMUSCULAR; INTRAVENOUS ONCE
Refills: 0 | Status: COMPLETED | OUTPATIENT
Start: 2020-07-07 | End: 2020-07-07

## 2020-07-07 RX ORDER — SPIRONOLACTONE 25 MG/1
25 TABLET, FILM COATED ORAL DAILY
Refills: 0 | Status: DISCONTINUED | OUTPATIENT
Start: 2020-07-07 | End: 2020-07-16

## 2020-07-07 RX ORDER — BUMETANIDE 0.25 MG/ML
1 INJECTION INTRAMUSCULAR; INTRAVENOUS ONCE
Refills: 0 | Status: DISCONTINUED | OUTPATIENT
Start: 2020-07-07 | End: 2020-07-07

## 2020-07-07 RX ORDER — LANOLIN ALCOHOL/MO/W.PET/CERES
3 CREAM (GRAM) TOPICAL AT BEDTIME
Refills: 0 | Status: DISCONTINUED | OUTPATIENT
Start: 2020-07-07 | End: 2020-07-16

## 2020-07-07 RX ORDER — FERROUS SULFATE 325(65) MG
325 TABLET ORAL DAILY
Refills: 0 | Status: DISCONTINUED | OUTPATIENT
Start: 2020-07-07 | End: 2020-07-16

## 2020-07-07 RX ORDER — CEFTRIAXONE 500 MG/1
1000 INJECTION, POWDER, FOR SOLUTION INTRAMUSCULAR; INTRAVENOUS EVERY 24 HOURS
Refills: 0 | Status: DISCONTINUED | OUTPATIENT
Start: 2020-07-08 | End: 2020-07-08

## 2020-07-07 RX ORDER — LIDOCAINE HYDROCHLORIDE AND EPINEPHRINE 10; 10 MG/ML; UG/ML
20 INJECTION, SOLUTION INFILTRATION; PERINEURAL ONCE
Refills: 0 | Status: COMPLETED | OUTPATIENT
Start: 2020-07-07 | End: 2020-07-07

## 2020-07-07 RX ORDER — CEFTRIAXONE 500 MG/1
1000 INJECTION, POWDER, FOR SOLUTION INTRAMUSCULAR; INTRAVENOUS ONCE
Refills: 0 | Status: COMPLETED | OUTPATIENT
Start: 2020-07-07 | End: 2020-07-07

## 2020-07-07 RX ORDER — CEFTRIAXONE 500 MG/1
INJECTION, POWDER, FOR SOLUTION INTRAMUSCULAR; INTRAVENOUS
Refills: 0 | Status: DISCONTINUED | OUTPATIENT
Start: 2020-07-07 | End: 2020-07-08

## 2020-07-07 RX ADMIN — SPIRONOLACTONE 25 MILLIGRAM(S): 25 TABLET, FILM COATED ORAL at 08:41

## 2020-07-07 RX ADMIN — Medication 3 MILLIGRAM(S): at 02:06

## 2020-07-07 RX ADMIN — MILRINONE LACTATE 2.6 MICROGRAM(S)/KG/MIN: 1 INJECTION, SOLUTION INTRAVENOUS at 20:02

## 2020-07-07 RX ADMIN — HEPARIN SODIUM 5000 UNIT(S): 5000 INJECTION INTRAVENOUS; SUBCUTANEOUS at 21:57

## 2020-07-07 RX ADMIN — BUMETANIDE 1 MILLIGRAM(S): 0.25 INJECTION INTRAMUSCULAR; INTRAVENOUS at 01:12

## 2020-07-07 RX ADMIN — Medication 0.12 MILLIGRAM(S): at 08:41

## 2020-07-07 RX ADMIN — HEPARIN SODIUM 5000 UNIT(S): 5000 INJECTION INTRAVENOUS; SUBCUTANEOUS at 06:04

## 2020-07-07 RX ADMIN — LIDOCAINE HYDROCHLORIDE AND EPINEPHRINE 20 MILLILITER(S): 10; 10 INJECTION, SOLUTION INFILTRATION; PERINEURAL at 12:35

## 2020-07-07 RX ADMIN — BUMETANIDE 2 MILLIGRAM(S): 0.25 INJECTION INTRAMUSCULAR; INTRAVENOUS at 06:05

## 2020-07-07 RX ADMIN — HEPARIN SODIUM 5000 UNIT(S): 5000 INJECTION INTRAVENOUS; SUBCUTANEOUS at 13:35

## 2020-07-07 RX ADMIN — Medication 325 MILLIGRAM(S): at 08:41

## 2020-07-07 RX ADMIN — MILRINONE LACTATE 2.6 MICROGRAM(S)/KG/MIN: 1 INJECTION, SOLUTION INTRAVENOUS at 07:35

## 2020-07-07 RX ADMIN — BUMETANIDE 2 MILLIGRAM(S): 0.25 INJECTION INTRAMUSCULAR; INTRAVENOUS at 17:00

## 2020-07-07 RX ADMIN — CEFTRIAXONE 100 MILLIGRAM(S): 500 INJECTION, POWDER, FOR SOLUTION INTRAMUSCULAR; INTRAVENOUS at 16:50

## 2020-07-07 NOTE — PROGRESS NOTE PEDS - SUBJECTIVE AND OBJECTIVE BOX
INTERVAL HISTORY: Overnight placed on O2 for comfort reasons. Complains of orthopnea. He is -1.2 L since admission and -800 negative overnight. He is receiving 2 mg IV Bumex q 12 and responds well to it. Continues on milrinone at 0.1 mcg/kg/min. Plan to perform pericardial tap this afternoon.     RESPIRATORY SUPPORT: RA, sats ~88%-90%    NUTRITION: Sodium and Cholesterol restricted diet     INTAKE/OUTPUT:   @ 07:01  -   @ 07:00  --------------------------------------------------------  IN: 31.2 mL / OUT: 850 mL / NET: -818.8 mL    INTRAVASCULAR ACCESS: PIV     MEDICATIONS:  buMETAnide Injectable 2 milliGRAM(s) IV Push every 12 hours  digoxin     Tablet 0.125 milliGRAM(s) Oral daily  milrinone Infusion 0.125 MICROgram(s)/kG/Min IV Continuous <Continuous>  spironolactone 25 milliGRAM(s) Oral daily  melatonin 3 milliGRAM(s) Oral at bedtime  ferrous    sulfate 325 milliGRAM(s) Oral daily  heparin   Injectable 5000 Unit(s) SubCutaneous every 8 hours    PHYSICAL EXAMINATION:  Vital signs - Weight (kg): 69.2 ( @ 18:43)  T(C): 36.3 (20 @ 12:00), Max: 36.9 (20 @ 14:48)  HR: 88 (20 @ 12:00) (74 - 101)  BP: 108/53 (20 @ 12:00) (97/63 - 133/96)  RR: 20 (20 @ 12:00) (13 - 28)  SpO2: 92% (20 @ 12:00) (88% - 97%)    General - non-dysmorphic appearance, well-developed, in no distress.  Skin - no rash, no desquamation, no cyanosis.  Eyes / ENT - no conjunctival injection, sclerae anicteric, external ears & nares normal, mucous membranes moist.  Pulmonary - normal inspiratory effort, no retractions, lungs clear to auscultation bilaterally, no wheezes, no rales.  Cardiovascular - normal rate, regular rhythm, normal S1 & S2, no murmurs, no rubs, no gallops, capillary refill < 2sec, normal pulses.  Gastrointestinal - soft, non-distended, non-tender, no hepatomegaly (liver palpable *cm below right costal margin).  Musculoskeletal - no joint swelling, no clubbing, no edema.  Neurologic / Psychiatric - alert, oriented as age-appropriate, affect appropriate, moves all extremities, normal tone.    LABORATORY TESTS:                          11.2  CBC:   8.76 )-----------( 319   (20 @ 01:10)                          35.0               128   |  89    |  11                 Ca: 8.7    BMP:   ----------------------------< 135    M.0   (20 @ 01:10)             4.0    |  25    | 0.65               Ph: 3.6      LFT:     TPro: 5.7 / Alb: 2.9 / TBili: 0.6 / DBili: x / AST: 13 / ALT: 9 / AlkPhos: 133   (20 @ 01:10)    COAG: PT: 15.8 / PTT: 31.4 / INR: 1.36   (20 @ 06:00)     CARDIAC MARKERS:             High-Sensitivity Troponin: 11   (20 @ 06:00)             CK: x / CKMB: x   (20 @ 06:00)             Pro-BNP: x   (20 @ 06:00)  CARDIAC MARKERS:             High-Sensitivity Troponin: 11   (20 @ 15:57)             CK: x / CKMB: x   (20 @ 15:57)             Pro-BNP: 772.7   (20 @ 15:57)      VBG:   pH: 7.49 / pCO2: 37 / pO2: 39 / HCO3: 28 / Base Excess: 4.1 / SaO2: 71.5   (20 @ 15:57)    IMAGING STUDIES:  Electrocardiogram - 2020. Sinus rhythm at 92 bpm with a prolonged GA interval (230 msec). Complete Right Bundle Branch Block.  Chest x-ray - (20) Left pleural effusion     Echocardiogram -   2020. (Please see full report for more details)   The right (single ventricle) function is severely diminished as compared to the study of 2020.  There is phasic flow in the IVC to Fontan circuit and the right sided Justice shunt is not clearly defined but there does not appear to be any obstruction to flow.  The pulmonary arteries continue to be non-visualized well. The study from 2020 showed sinus rhythm with a very prolonged GA interval. Wenkebach periodicity was also noted and there were rare PVCs an couplets. DISCHARGE CRITERIA:   - Off all IV medications   - Has a heart failure oral home regimen   - Peritoneal tap performed.   - Optimization of fluid balance     INTERVAL HISTORY: Overnight placed on O2 for comfort reasons. Complains of orthopnea. He is -1.2 L since admission and -800 negative overnight. He is receiving 2 mg IV Bumex q 12 and responds well to it. Continues on milrinone at 0.1 mcg/kg/min. Plan to perform pericardial tap this afternoon.     RESPIRATORY SUPPORT: RA, sats ~88%-90%    NUTRITION: Sodium and Cholesterol restricted diet     INTAKE/OUTPUT:   @ 07:01  -   @ 07:00  --------------------------------------------------------  IN: 31.2 mL / OUT: 850 mL / NET: -818.8 mL    INTRAVASCULAR ACCESS: PIV     MEDICATIONS:  buMETAnide Injectable 2 milliGRAM(s) IV Push every 12 hours  digoxin     Tablet 0.125 milliGRAM(s) Oral daily  milrinone Infusion 0.125 MICROgram(s)/kG/Min IV Continuous <Continuous>  spironolactone 25 milliGRAM(s) Oral daily  melatonin 3 milliGRAM(s) Oral at bedtime  ferrous    sulfate 325 milliGRAM(s) Oral daily  heparin   Injectable 5000 Unit(s) SubCutaneous every 8 hours    PHYSICAL EXAMINATION:  Vital signs - Weight (kg): 69.2 ( @ 18:43)  T(C): 36.3 (20 @ 12:00), Max: 36.9 (20 @ 14:48)  HR: 88 (20 @ 12:00) (74 - 101)  BP: 108/53 (20 @ 12:00) (97/63 - 133/96)  RR: 20 (20 @ 12:00) (13 - 28)  SpO2: 92% (20 @ 12:00) (88% - 97%)    General - non-dysmorphic appearance, well-developed, in no distress.  Skin - no rash, no desquamation, no cyanosis.  Eyes / ENT - no conjunctival injection, sclerae anicteric, external ears & nares normal, mucous membranes moist.  Pulmonary - normal inspiratory effort, no retractions, lungs clear to auscultation bilaterally, no wheezes, no rales.  Cardiovascular - normal rate, regular rhythm, normal S1 & single S2, 2/6 holosystolic murmur at the LLSB and apex, no rubs, no gallops, capillary refill < 2sec, normal pulses.  Gastrointestinal - soft, non-distended, non-tender, no hepatomegaly (liver palpable *cm below right costal margin).  Musculoskeletal - no joint swelling, no clubbing, no edema.  Neurologic / Psychiatric - alert, oriented as age-appropriate, affect appropriate, moves all extremities, normal tone.    LABORATORY TESTS:                          11.2  CBC:   8.76 )-----------( 319   (20 @ 01:10)                          35.0               128   |  89    |  11                 Ca: 8.7    BMP:   ----------------------------< 135    M.0   (20 @ 01:10)             4.0    |  25    | 0.65               Ph: 3.6      LFT:     TPro: 5.7 / Alb: 2.9 / TBili: 0.6 / DBili: x / AST: 13 / ALT: 9 / AlkPhos: 133   (20 @ 01:10)    COAG: PT: 15.8 / PTT: 31.4 / INR: 1.36   (20 @ 06:00)     CARDIAC MARKERS:             High-Sensitivity Troponin: 11   (20 @ 06:00)             CK: x / CKMB: x   (20 @ 06:00)             Pro-BNP: x   (20 @ 06:00)  CARDIAC MARKERS:             High-Sensitivity Troponin: 11   (20 @ 15:57)             CK: x / CKMB: x   (20 @ 15:57)             Pro-BNP: 772.7   (20 @ 15:57)      VBG:   pH: 7.49 / pCO2: 37 / pO2: 39 / HCO3: 28 / Base Excess: 4.1 / SaO2: 71.5   (20 @ 15:57)    IMAGING STUDIES:  Electrocardiogram - 2020. Sinus rhythm at 92 bpm with a prolonged GA interval (230 msec). Complete Right Bundle Branch Block.  Chest x-ray - (20) Left pleural effusion     Echocardiogram -   2020. (Please see full report for more details)   The right (single ventricle) function is severely diminished as compared to the study of 2020.  There is phasic flow in the IVC to Fontan circuit and the right sided Justice shunt is not clearly defined but there does not appear to be any obstruction to flow.  The pulmonary arteries continue to be non-visualized well. The study from 2020 showed sinus rhythm with a very prolonged GA interval. Wenkebach periodicity was also noted and there were rare PVCs an couplets.

## 2020-07-07 NOTE — PROGRESS NOTE ADULT - ASSESSMENT
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  Sent from clinic for IV diuresis and milrinone therapy.      #Neuro   - No acute issues    #Cardiovascular  Acute Decompensated Heart Failure   - Sent by [facility] for diuresis and milrinone therapy   - continue diuresis with Bumex gtt and home aldactone   - continue milrinone gtt   - Continue home digoxin   - HOLD home lisinopril per Congenital Heart team   - Paracentesis to improve breathing mechanics; abdomen distended and tympanic   - [facility] arrangements per Peds Congenital Heart team    #Respiratory   - Patient baseline sat 88%   - Currently satting 95% on 2L nc   - Will try to avoid intubation if possible; he would be very high risk due to his pulmonary hypertension    #Gastrointestinal  Ascites   - Likely secondary to heart failure vs cirrhosis   - Will do paracentesis, send studies including SAAG and culture  Gastritis   - Continue home protonix   - History of GI bleed    #Renal   - Will diurese with Bumex gtt and aldactone   - Baseline Cr appears to be ~0.8    #Infectious Disease   - No active issues    #Endo   - No acute issues    #Heme  Anemia   - Has baseline anemia ~8.0

## 2020-07-07 NOTE — PROGRESS NOTE ADULT - SUBJECTIVE AND OBJECTIVE BOX
CHIEF COMPLAINT: Patient is a 29y old  Male who presents with a chief complaint of Acute decompensated heart failure (06 Jul 2020 18:50)    Interval Events:  Overnight had good UOP with 4mg bumex total (net negative 1200mL).  Feels well.  Eating and drinking well.    REVIEW OF SYSTEMS:  In additional the that documented in the HPI, the additional ROS was obtained:    CONSTITUTIONAL: No fever, no chills  EYES: no change in vision, no blurred vision  HEENT: no trouble swallowing, no trouble speaking, no sore throat  NECK: no pain, no stiffness  CV: no chest pain, no palpitations  RESP: no cough, no shortness of breath  GI: no abdominal pain, no nausea, no vomiting, no diarrhea, no constipation, no BRBPR or melena  : No dysuria, no frequency  NEURO: no headache, no new numbness, no weakness, no dizziness  SKIN: no new rashes  HEME: No easy bruising or bleeding  MSK: No joint pain, no recent trauma  Tariq Abernathy M.D. -Resident     OBJECTIVE:  ICU Vital Signs Last 24 Hrs  T(C): 35.8 (07 Jul 2020 07:46), Max: 36.9 (06 Jul 2020 14:48)  T(F): 96.5 (07 Jul 2020 07:46), Max: 98.5 (06 Jul 2020 14:48)  HR: 88 (07 Jul 2020 11:00) (74 - 101)  BP: 113/57 (07 Jul 2020 11:00) (97/63 - 133/96)  BP(mean): 70 (07 Jul 2020 11:00) (62 - 93)  ABP: --  ABP(mean): --  RR: 28 (07 Jul 2020 11:00) (13 - 28)  SpO2: 94% (07 Jul 2020 11:00) (88% - 97%)        07-06 @ 07:01  -  07-07 @ 07:00  --------------------------------------------------------  IN: 31.2 mL / OUT: 850 mL / NET: -818.8 mL    07-07 @ 07:01  -  07-07 @ 11:58  --------------------------------------------------------  IN: 7.8 mL / OUT: 775 mL / NET: -767.2 mL      CAPILLARY BLOOD GLUCOSE      PHYSICAL EXAM:  Vital signs reviewed.  CONSTITUTIONAL: NAD, awake, interactive  HEAD: Normocephalic; atraumatic  EYES: EOMI, no nystagmus, no conjunctival injection, no scleral icterus  MOUTH/THROAT:  MMM  NECK: Trachea midline, no JVD  CV: Normal S1, S2; no audible murmurs; extremities WWP  RESP: normal work of breathing; CTAB, no stridor  ABD: soft, distended, nontender  : Deferred  MSK/EXT: no edema, +prominent vasculature on legs b/l.  normal ROM in all four extremities, no deformities  SKIN: No gross rashes or lesions on exposed skin, no significant trophic changes  NEURO: aaox3, moving all extremities purposefully and spontaneously     HOSPITAL MEDICATIONS:  MEDICATIONS  (STANDING):  buMETAnide Injectable 2 milliGRAM(s) IV Push every 12 hours  chlorhexidine 4% Liquid 1 Application(s) Topical <User Schedule>  digoxin     Tablet 0.125 milliGRAM(s) Oral daily  ferrous    sulfate 325 milliGRAM(s) Oral daily  heparin   Injectable 5000 Unit(s) SubCutaneous every 8 hours  melatonin 3 milliGRAM(s) Oral at bedtime  milrinone Infusion 0.125 MICROgram(s)/kG/Min (2.6 mL/Hr) IV Continuous <Continuous>  spironolactone 25 milliGRAM(s) Oral daily    MEDICATIONS  (PRN):      LABS:  (07-07 @ 01:10)                        11.2  8.76 )-----------( 319                 35.0    Neutrophils = 7.66 (87.5%)  Lymphocytes = 0.38 (4.3%)  Eosinophils = 0.05 (0.6%)  Basophils = 0.03 (0.3%)  Monocytes = 0.59 (6.7%)  Bands = --%    WBC Trend: 8.76<--, 11.02<--  Hb Trend: 11.2<--, 12.3<--  Plt Trend: 319<--, 404<--  07-07    128<L>  |  89<L>  |  11  ----------------------------<  135<H>  4.0   |  25  |  0.65    Ca    8.7      07 Jul 2020 01:10  Phos  3.6     07-07  Mg     2.0     07-07    TPro  5.7<L>  /  Alb  2.9<L>  /  TBili  0.6  /  DBili  x   /  AST  13  /  ALT  9   /  AlkPhos  133<H>  07-07    Creatinine Trend: 0.65<--, 0.65<--  PT/INR - ( 07 Jul 2020 06:00 )   PT: 15.8 SEC;   INR: 1.36     PTT - ( 07 Jul 2020 06:00 )  PTT:31.4 SEC      Venous Blood Gas:  07-06 @ 15:57  7.49/37/39/28/71.5  VBG Lactate: 1.2    RADIOLOGY:  Ultrasound: ascites on bedside ultrasound exam, along with pleural effusion on L>R

## 2020-07-07 NOTE — PROGRESS NOTE ADULT - ASSESSMENT
· Assessment		  30 yo M w/ PMHx hypoplastic L heart s/p José procedure, GIB (1/2020) presenting w/ acute on chronic ADHF and worsening ascites, hepatology consulted for ascites management.     Etiology of patient's worsening ascites likely from congestive hepatopathy and subsequent cirrhosis. However difficult to determine whether ascites is entirely attributed to heart failure, or whether prolonged liver damage and cirrhosis have led to independent problem. Additionally, will test for chronic liver conditions.     Recommendations:  - paracentesis, please send cell count, albumin, protein, cultures  - serologies: HAV, HBVsAb, HBVsAg, HCV Ab, ceruloplasm, alpha-1 antitrypsin, AFP, iron sat/ferritin/TIBC, immunoglobulin panel (IgG, IgA, IgM), MICHELLE, anti-smooth muscle, anti-mitochondrial, Anti-liver kidney microsomal antibody  - imaging: RUQ US w/ doppler to r/o PVT / HVT  - patient would benefit from evaluation of fibrosis, likely with MR elastography, which can be done as outpatient. Additionally, patient will need imaging for liver mass lesion, as most recent abdominal imaging is CT from 8/2019. Will start with US as described above, but if patient gets MR elastography as outpatient, would recommend getting MRI liver at same time  - if patient goes for cardiac catheterization, would consider doing wedged hepatic venous pressure, to further elucidate whether ascites is purely from cardiac pressures or whether independent portal pressures are contributing  - diuretics management per MICU/cardiology    Thank you for this interesting consult. Hepatology will continue to follow.     Sylvester Mccray, PGY4  Gastroenterology Fellow  Available on [employer] Teams  [phone] ([place] Pager)    After 5pm, please contact the on-call GI fellow. [phone]

## 2020-07-07 NOTE — PROGRESS NOTE ADULT - ATTENDING COMMENTS
Patient feels well at rest.  Ascites to have paracentesis today.  Lab studies to assess liver disease pending.  Continue evaluation as planned.

## 2020-07-07 NOTE — PROGRESS NOTE ADULT - SUBJECTIVE AND OBJECTIVE BOX
Chief Complaint:  Patient is a 29y old  Male who presents with a chief complaint of Acute decompensated heart failure (2020 11:57)      Interval Events: Yesterday patient admitted to MICU, started on milrinone, diuresing well. Continues to be in mild resp distress, difficulty finishing sentences. States abd distention slightly improved. Labs notable for improving hyponatremia (124 -> 128), and leukocytosis (11 ->8), LFT's stable. Plan for paracentesis today.     Allergies:  No Known Allergies      Hospital Medications:  buMETAnide Injectable 2 milliGRAM(s) IV Push every 12 hours  chlorhexidine 4% Liquid 1 Application(s) Topical <User Schedule>  digoxin     Tablet 0.125 milliGRAM(s) Oral daily  ferrous    sulfate 325 milliGRAM(s) Oral daily  heparin   Injectable 5000 Unit(s) SubCutaneous every 8 hours  melatonin 3 milliGRAM(s) Oral at bedtime  milrinone Infusion 0.125 MICROgram(s)/kG/Min IV Continuous <Continuous>  spironolactone 25 milliGRAM(s) Oral daily        PHYSICAL EXAM:   Vital Signs:  Vital Signs Last 24 Hrs  T(C): 36.3 (2020 12:00), Max: 36.9 (2020 14:48)  T(F): 97.3 (2020 12:00), Max: 98.5 (2020 14:48)  HR: 87 (2020 13:33) (74 - 101)  BP: 101/55 (2020 13:33) (97/63 - 133/96)  BP(mean): 66 (2020 13:33) (62 - 93)  RR: 22 (2020 13:33) (13 - 28)  SpO2: 98% (2020 13:33) (88% - 98%)  Daily     Daily Weight in k.2 (2020 18:43)    GENERAL:  No acute distress  HEENT:  Normocephalic/atraumatic, no scleral icterus  CHEST:  Clear to auscultation bilaterally, no wheezes/rales/ronchi, no accessory muscle use  HEART:  Regular rate and rhythm, no murmurs/rubs/gallops  ABDOMEN:  distended, soft, +BS, + hepatomegaly  EXTREMITIES: No cyanosis, clubbing, or edema  SKIN:  chronic LE vascular congestion  NEURO:  Alert and oriented x 3, no asterixis      LABS:                        11.2   8.76  )-----------( 319      ( 2020 01:10 )             35.0     Mean Cell Volume: 79.0 fL (20 @ 01:10)    07-    128<L>  |  89<L>  |  11  ----------------------------<  135<H>  4.0   |  25  |  0.65    Ca    8.7      2020 01:10  Phos  3.6       Mg     2.0         TPro  5.7<L>  /  Alb  2.9<L>  /  TBili  0.6  /  DBili  x   /  AST  13  /  ALT  9   /  AlkPhos  133<H>      LIVER FUNCTIONS - ( 2020 01:10 )  Alb: 2.9 g/dL / Pro: 5.7 g/dL / ALK PHOS: 133 u/L / ALT: 9 u/L / AST: 13 u/L / GGT: x           PT/INR - ( 2020 06:00 )   PT: 15.8 SEC;   INR: 1.36          PTT - ( 2020 06:00 )  PTT:31.4 SEC    Imaging:  Prior GI procedures:  EGD 2020  Findings:   The examined esophagus was normal.   The Z-line was regular and was found 39 cm from the incisors.   The entire examined stomach was normal. Biopsies were taken with a cold    forceps for Helicobacter pylori testing. (PATH: HP NEGATIVE)   The duodenal bulb was normal.   The 2nd part of the duodenum was normal. Bilious fluid was seen    throughout the duodenum.   There was mild friability and self-limited blood oozing in the first    part of the duodenum after endoscope withdrawal. No underlying lesion    noted. Likely secondary trauma from passage of endoscope.     Colonoscopy 2020  Findings:   The perianal and digital rectal examinations were normal.   A 3 mm polyp was found in the sigmoid colon. The polyp was sessile. The    polyp was removed with a cold snare. Resection and retrieval were    complete. (PATH: HP)   Bilious fluid with blood-tinged appearance was found in the ascending    colon and cecum. No underlying lesion was seen. No active bleeding was    seen.   The terminal ileum appeared normal. Dark brown stool was seen in the    terminal ileum.    VCE 2020:   Gastric passage time: 16h  Small bowel passage time: 2h 6m  Findings:   Esophagus: grossly normal  Stomach: Small clean based gastric ulcer, possibly from recent biopsy site  Duodenum: Proximal duodenum (d@) noted for patchy erythema and fresh blood. There was no additional blood or underlying lesions seen in the remainder of the small bowel, though visualization was limited by prep quality.   Colon: Poor visualization 2/2 prep quality, but scant blood was noted

## 2020-07-07 NOTE — PROGRESS NOTE PEDS - ASSESSMENT
To summarize, Arie is a 30 y/o with history of HLHS s/p Fontan palliation. The 3 stage repair was performed at [facility]. Extracardiac Fontan completion was performed in 1997 by Dr Bennett followed by device closure of the Fenestration is 1999 with cardiac cath. He had been lost to follow up for several years and last year started following up with our adult congenital team. Unfortunately his presentation is consistent with failing Fontan with worsening congestive heart failure in the form of severe ascites, and significant diminution of his single ventricle function. To summarize, Arie is a 28 y/o with history of HLHS s/p Fontan palliation. The 3 stage repair was performed at [facility]. Extracardiac Fontan completion was performed in 1997 by Dr Bennett followed by device closure of the Fenestration is 1999 with cardiac cath. He had been lost to follow up for several years and last year started following up with our adult congenital team. Unfortunately his presentation is consistent with failing Fontan with worsening congestive heart failure in the form of severe ascites, and significant diminution of his single ventricle function. He is admitted for optimization of the heart failure management and peritoneal tap. Because of the complex physiology of single ventricle and his tenuous hemodynamic state, he needs continuous ICU monitoring.   Plan   CV:   - Continuous tele monitoring. Please page Cardiology if there is concern of any arrhythmias.   - Continue Bumex 2 mg IV q 12 hr for now. He is responding appropriately to it. Fluid goal at least negative ~ 1 L/day. Continue Aldactone.   - Continue milrinone at 0.1 mc/kg/min. Continue the current dose of digoxin, may need digoxin dose to be increased before discharge.   - Plan for a cardiac cath during this admission.   - Repeat echo tomorrow to re-assess cardiac function. To be performed by Pediatric Cardiology service.   Renal:   - Strict I's and O's.  - Will continue to monitor electrolytes and BUN/Cr   Resp:   - On RA, goal sats >88%. Uses O2 as needed for comfort.   GI:   - Plan for peritoneal tap this afternoon.   - Hepatology consulted and recommended labs and MRI elastography to rule out fibrosis. Appreciate the recs.

## 2020-07-07 NOTE — PROGRESS NOTE ADULT - ATTENDING COMMENTS
Agree with above. Seen and examined with team on rounds. Therapeutic paracentesis today, continue milrinone and bumex.

## 2020-07-08 LAB
A1AT SERPL-MCNC: 255 MG/DL — HIGH (ref 90–200)
ALBUMIN SERPL ELPH-MCNC: 2.5 G/DL — LOW (ref 3.3–5)
ALBUMIN SERPL ELPH-MCNC: 2.8 G/DL — LOW (ref 3.3–5)
ALBUMIN SERPL ELPH-MCNC: 3 G/DL — LOW (ref 3.3–5)
ALP SERPL-CCNC: 127 U/L — HIGH (ref 40–120)
ALP SERPL-CCNC: 127 U/L — HIGH (ref 40–120)
ALP SERPL-CCNC: 138 U/L — HIGH (ref 40–120)
ALT FLD-CCNC: 10 U/L — SIGNIFICANT CHANGE UP (ref 4–41)
ALT FLD-CCNC: 8 U/L — SIGNIFICANT CHANGE UP (ref 4–41)
ALT FLD-CCNC: 8 U/L — SIGNIFICANT CHANGE UP (ref 4–41)
ANION GAP SERPL CALC-SCNC: 11 MMO/L — SIGNIFICANT CHANGE UP (ref 7–14)
ANION GAP SERPL CALC-SCNC: 6 MMO/L — LOW (ref 7–14)
ANION GAP SERPL CALC-SCNC: 7 MMO/L — SIGNIFICANT CHANGE UP (ref 7–14)
AST SERPL-CCNC: 10 U/L — SIGNIFICANT CHANGE UP (ref 4–40)
AST SERPL-CCNC: 13 U/L — SIGNIFICANT CHANGE UP (ref 4–40)
AST SERPL-CCNC: 13 U/L — SIGNIFICANT CHANGE UP (ref 4–40)
BASOPHILS # BLD AUTO: 0.05 K/UL — SIGNIFICANT CHANGE UP (ref 0–0.2)
BASOPHILS # BLD AUTO: 0.06 K/UL — SIGNIFICANT CHANGE UP (ref 0–0.2)
BASOPHILS # BLD AUTO: 0.06 K/UL — SIGNIFICANT CHANGE UP (ref 0–0.2)
BASOPHILS NFR BLD AUTO: 0.6 % — SIGNIFICANT CHANGE UP (ref 0–2)
BASOPHILS NFR BLD AUTO: 0.7 % — SIGNIFICANT CHANGE UP (ref 0–2)
BASOPHILS NFR BLD AUTO: 0.9 % — SIGNIFICANT CHANGE UP (ref 0–2)
BILIRUB SERPL-MCNC: 0.2 MG/DL — SIGNIFICANT CHANGE UP (ref 0.2–1.2)
BILIRUB SERPL-MCNC: 0.3 MG/DL — SIGNIFICANT CHANGE UP (ref 0.2–1.2)
BILIRUB SERPL-MCNC: 0.3 MG/DL — SIGNIFICANT CHANGE UP (ref 0.2–1.2)
BLD GP AB SCN SERPL QL: NEGATIVE — SIGNIFICANT CHANGE UP
BUN SERPL-MCNC: 14 MG/DL — SIGNIFICANT CHANGE UP (ref 7–23)
BUN SERPL-MCNC: 17 MG/DL — SIGNIFICANT CHANGE UP (ref 7–23)
BUN SERPL-MCNC: 21 MG/DL — SIGNIFICANT CHANGE UP (ref 7–23)
CALCIUM SERPL-MCNC: 8.4 MG/DL — SIGNIFICANT CHANGE UP (ref 8.4–10.5)
CALCIUM SERPL-MCNC: 8.6 MG/DL — SIGNIFICANT CHANGE UP (ref 8.4–10.5)
CALCIUM SERPL-MCNC: 8.7 MG/DL — SIGNIFICANT CHANGE UP (ref 8.4–10.5)
CERULOPLASMIN SERPL-MCNC: 25 MG/DL — SIGNIFICANT CHANGE UP (ref 15–30)
CHLORIDE SERPL-SCNC: 92 MMOL/L — LOW (ref 98–107)
CHLORIDE SERPL-SCNC: 92 MMOL/L — LOW (ref 98–107)
CHLORIDE SERPL-SCNC: 93 MMOL/L — LOW (ref 98–107)
CO2 SERPL-SCNC: 27 MMOL/L — SIGNIFICANT CHANGE UP (ref 22–31)
CO2 SERPL-SCNC: 28 MMOL/L — SIGNIFICANT CHANGE UP (ref 22–31)
CO2 SERPL-SCNC: 32 MMOL/L — HIGH (ref 22–31)
CREAT SERPL-MCNC: 0.66 MG/DL — SIGNIFICANT CHANGE UP (ref 0.5–1.3)
CREAT SERPL-MCNC: 0.69 MG/DL — SIGNIFICANT CHANGE UP (ref 0.5–1.3)
CREAT SERPL-MCNC: 0.72 MG/DL — SIGNIFICANT CHANGE UP (ref 0.5–1.3)
EOSINOPHIL # BLD AUTO: 0.05 K/UL — SIGNIFICANT CHANGE UP (ref 0–0.5)
EOSINOPHIL # BLD AUTO: 0.07 K/UL — SIGNIFICANT CHANGE UP (ref 0–0.5)
EOSINOPHIL # BLD AUTO: 0.08 K/UL — SIGNIFICANT CHANGE UP (ref 0–0.5)
EOSINOPHIL NFR BLD AUTO: 0.6 % — SIGNIFICANT CHANGE UP (ref 0–6)
EOSINOPHIL NFR BLD AUTO: 1 % — SIGNIFICANT CHANGE UP (ref 0–6)
EOSINOPHIL NFR BLD AUTO: 1 % — SIGNIFICANT CHANGE UP (ref 0–6)
FERRITIN SERPL-MCNC: 171.5 NG/ML — SIGNIFICANT CHANGE UP (ref 30–400)
FOLATE SERPL-MCNC: 8.4 NG/ML — SIGNIFICANT CHANGE UP (ref 4.7–20)
GLUCOSE SERPL-MCNC: 104 MG/DL — HIGH (ref 70–99)
GLUCOSE SERPL-MCNC: 141 MG/DL — HIGH (ref 70–99)
GLUCOSE SERPL-MCNC: 91 MG/DL — SIGNIFICANT CHANGE UP (ref 70–99)
HAPTOGLOB SERPL-MCNC: 311 MG/DL — HIGH (ref 34–200)
HCT VFR BLD CALC: 30 % — LOW (ref 39–50)
HCT VFR BLD CALC: 34.6 % — LOW (ref 39–50)
HCT VFR BLD CALC: 35.3 % — LOW (ref 39–50)
HGB BLD-MCNC: 11.2 G/DL — LOW (ref 13–17)
HGB BLD-MCNC: 11.4 G/DL — LOW (ref 13–17)
HGB BLD-MCNC: 9.9 G/DL — LOW (ref 13–17)
IGA FLD-MCNC: 113 MG/DL — SIGNIFICANT CHANGE UP (ref 70–400)
IGG FLD-MCNC: 797 MG/DL — SIGNIFICANT CHANGE UP (ref 700–1600)
IGM SERPL-MCNC: 32 MG/DL — LOW (ref 40–230)
IMM GRANULOCYTES NFR BLD AUTO: 0.5 % — SIGNIFICANT CHANGE UP (ref 0–1.5)
IMM GRANULOCYTES NFR BLD AUTO: 0.6 % — SIGNIFICANT CHANGE UP (ref 0–1.5)
IMM GRANULOCYTES NFR BLD AUTO: 0.7 % — SIGNIFICANT CHANGE UP (ref 0–1.5)
IRON SATN MFR SERPL: 247 UG/DL — SIGNIFICANT CHANGE UP (ref 155–535)
IRON SATN MFR SERPL: 28 UG/DL — LOW (ref 45–165)
LDH SERPL L TO P-CCNC: 170 U/L — SIGNIFICANT CHANGE UP (ref 135–225)
LYMPHOCYTES # BLD AUTO: 0.41 K/UL — LOW (ref 1–3.3)
LYMPHOCYTES # BLD AUTO: 0.44 K/UL — LOW (ref 1–3.3)
LYMPHOCYTES # BLD AUTO: 0.51 K/UL — LOW (ref 1–3.3)
LYMPHOCYTES # BLD AUTO: 5.6 % — LOW (ref 13–44)
LYMPHOCYTES # BLD AUTO: 5.9 % — LOW (ref 13–44)
LYMPHOCYTES # BLD AUTO: 6.2 % — LOW (ref 13–44)
MAGNESIUM SERPL-MCNC: 2 MG/DL — SIGNIFICANT CHANGE UP (ref 1.6–2.6)
MAGNESIUM SERPL-MCNC: 2.1 MG/DL — SIGNIFICANT CHANGE UP (ref 1.6–2.6)
MAGNESIUM SERPL-MCNC: 2.1 MG/DL — SIGNIFICANT CHANGE UP (ref 1.6–2.6)
MCHC RBC-ENTMCNC: 25.9 PG — LOW (ref 27–34)
MCHC RBC-ENTMCNC: 26.1 PG — LOW (ref 27–34)
MCHC RBC-ENTMCNC: 26.1 PG — LOW (ref 27–34)
MCHC RBC-ENTMCNC: 32.3 % — SIGNIFICANT CHANGE UP (ref 32–36)
MCHC RBC-ENTMCNC: 32.4 % — SIGNIFICANT CHANGE UP (ref 32–36)
MCHC RBC-ENTMCNC: 33 % — SIGNIFICANT CHANGE UP (ref 32–36)
MCV RBC AUTO: 78.9 FL — LOW (ref 80–100)
MCV RBC AUTO: 80.2 FL — SIGNIFICANT CHANGE UP (ref 80–100)
MCV RBC AUTO: 80.7 FL — SIGNIFICANT CHANGE UP (ref 80–100)
MITOCHONDRIA AB SER-ACNC: SIGNIFICANT CHANGE UP
MONOCYTES # BLD AUTO: 0.68 K/UL — SIGNIFICANT CHANGE UP (ref 0–0.9)
MONOCYTES # BLD AUTO: 0.71 K/UL — SIGNIFICANT CHANGE UP (ref 0–0.9)
MONOCYTES # BLD AUTO: 0.73 K/UL — SIGNIFICANT CHANGE UP (ref 0–0.9)
MONOCYTES NFR BLD AUTO: 8.6 % — SIGNIFICANT CHANGE UP (ref 2–14)
MONOCYTES NFR BLD AUTO: 9.3 % — SIGNIFICANT CHANGE UP (ref 2–14)
MONOCYTES NFR BLD AUTO: 9.8 % — SIGNIFICANT CHANGE UP (ref 2–14)
NEUTROPHILS # BLD AUTO: 5.66 K/UL — SIGNIFICANT CHANGE UP (ref 1.8–7.4)
NEUTROPHILS # BLD AUTO: 6.52 K/UL — SIGNIFICANT CHANGE UP (ref 1.8–7.4)
NEUTROPHILS # BLD AUTO: 6.9 K/UL — SIGNIFICANT CHANGE UP (ref 1.8–7.4)
NEUTROPHILS NFR BLD AUTO: 81.8 % — HIGH (ref 43–77)
NEUTROPHILS NFR BLD AUTO: 83 % — HIGH (ref 43–77)
NEUTROPHILS NFR BLD AUTO: 83.2 % — HIGH (ref 43–77)
NRBC # FLD: 0 K/UL — SIGNIFICANT CHANGE UP (ref 0–0)
PHOSPHATE SERPL-MCNC: 3.7 MG/DL — SIGNIFICANT CHANGE UP (ref 2.5–4.5)
PHOSPHATE SERPL-MCNC: 4 MG/DL — SIGNIFICANT CHANGE UP (ref 2.5–4.5)
PHOSPHATE SERPL-MCNC: 4.4 MG/DL — SIGNIFICANT CHANGE UP (ref 2.5–4.5)
PLATELET # BLD AUTO: 258 K/UL — SIGNIFICANT CHANGE UP (ref 150–400)
PLATELET # BLD AUTO: 270 K/UL — SIGNIFICANT CHANGE UP (ref 150–400)
PLATELET # BLD AUTO: 317 K/UL — SIGNIFICANT CHANGE UP (ref 150–400)
PMV BLD: 9 FL — SIGNIFICANT CHANGE UP (ref 7–13)
PMV BLD: 9.4 FL — SIGNIFICANT CHANGE UP (ref 7–13)
PMV BLD: SIGNIFICANT CHANGE UP FL (ref 7–13)
POTASSIUM SERPL-MCNC: 4 MMOL/L — SIGNIFICANT CHANGE UP (ref 3.5–5.3)
POTASSIUM SERPL-MCNC: 4.1 MMOL/L — SIGNIFICANT CHANGE UP (ref 3.5–5.3)
POTASSIUM SERPL-MCNC: 4.3 MMOL/L — SIGNIFICANT CHANGE UP (ref 3.5–5.3)
POTASSIUM SERPL-SCNC: 4 MMOL/L — SIGNIFICANT CHANGE UP (ref 3.5–5.3)
POTASSIUM SERPL-SCNC: 4.1 MMOL/L — SIGNIFICANT CHANGE UP (ref 3.5–5.3)
POTASSIUM SERPL-SCNC: 4.3 MMOL/L — SIGNIFICANT CHANGE UP (ref 3.5–5.3)
PROT SERPL-MCNC: 5.1 G/DL — LOW (ref 6–8.3)
PROT SERPL-MCNC: 5.4 G/DL — LOW (ref 6–8.3)
PROT SERPL-MCNC: 5.8 G/DL — LOW (ref 6–8.3)
RBC # BLD: 3.8 M/UL — LOW (ref 4.2–5.8)
RBC # BLD: 4.29 M/UL — SIGNIFICANT CHANGE UP (ref 4.2–5.8)
RBC # BLD: 4.4 M/UL — SIGNIFICANT CHANGE UP (ref 4.2–5.8)
RBC # FLD: 17.1 % — HIGH (ref 10.3–14.5)
RBC # FLD: 17.2 % — HIGH (ref 10.3–14.5)
RBC # FLD: 17.2 % — HIGH (ref 10.3–14.5)
RETICS #: 132 K/UL — HIGH (ref 25–125)
RETICS/RBC NFR: 3 % — HIGH (ref 0.5–2.5)
REVIEW TO FOLLOW: YES — SIGNIFICANT CHANGE UP
RH IG SCN BLD-IMP: POSITIVE — SIGNIFICANT CHANGE UP
SMOOTH MUSCLE AB SER-ACNC: SIGNIFICANT CHANGE UP
SODIUM SERPL-SCNC: 126 MMOL/L — LOW (ref 135–145)
SODIUM SERPL-SCNC: 131 MMOL/L — LOW (ref 135–145)
SODIUM SERPL-SCNC: 131 MMOL/L — LOW (ref 135–145)
UIBC SERPL-MCNC: 218.5 UG/DL — SIGNIFICANT CHANGE UP (ref 110–370)
VIT B12 SERPL-MCNC: 664 PG/ML — SIGNIFICANT CHANGE UP (ref 200–900)
WBC # BLD: 6.92 K/UL — SIGNIFICANT CHANGE UP (ref 3.8–10.5)
WBC # BLD: 7.86 K/UL — SIGNIFICANT CHANGE UP (ref 3.8–10.5)
WBC # BLD: 8.29 K/UL — SIGNIFICANT CHANGE UP (ref 3.8–10.5)
WBC # FLD AUTO: 6.92 K/UL — SIGNIFICANT CHANGE UP (ref 3.8–10.5)
WBC # FLD AUTO: 7.86 K/UL — SIGNIFICANT CHANGE UP (ref 3.8–10.5)
WBC # FLD AUTO: 8.29 K/UL — SIGNIFICANT CHANGE UP (ref 3.8–10.5)

## 2020-07-08 PROCEDURE — 74177 CT ABD & PELVIS W/CONTRAST: CPT | Mod: 26

## 2020-07-08 PROCEDURE — 93325 DOPPLER ECHO COLOR FLOW MAPG: CPT | Mod: 26

## 2020-07-08 PROCEDURE — 76870 US EXAM SCROTUM: CPT | Mod: 26

## 2020-07-08 PROCEDURE — 93303 ECHO TRANSTHORACIC: CPT | Mod: 26

## 2020-07-08 PROCEDURE — 93320 DOPPLER ECHO COMPLETE: CPT | Mod: 26

## 2020-07-08 PROCEDURE — 99233 SBSQ HOSP IP/OBS HIGH 50: CPT | Mod: GC

## 2020-07-08 PROCEDURE — 99291 CRITICAL CARE FIRST HOUR: CPT

## 2020-07-08 PROCEDURE — 71045 X-RAY EXAM CHEST 1 VIEW: CPT | Mod: 26

## 2020-07-08 RX ORDER — LIDOCAINE AND PRILOCAINE CREAM 25; 25 MG/G; MG/G
1 CREAM TOPICAL ONCE
Refills: 0 | Status: COMPLETED | OUTPATIENT
Start: 2020-07-08 | End: 2020-07-08

## 2020-07-08 RX ORDER — CEFTRIAXONE 500 MG/1
2000 INJECTION, POWDER, FOR SOLUTION INTRAMUSCULAR; INTRAVENOUS EVERY 24 HOURS
Refills: 0 | Status: COMPLETED | OUTPATIENT
Start: 2020-07-09 | End: 2020-07-12

## 2020-07-08 RX ORDER — BUMETANIDE 0.25 MG/ML
2 INJECTION INTRAMUSCULAR; INTRAVENOUS EVERY 8 HOURS
Refills: 0 | Status: DISCONTINUED | OUTPATIENT
Start: 2020-07-08 | End: 2020-07-15

## 2020-07-08 RX ORDER — CEFTRIAXONE 500 MG/1
INJECTION, POWDER, FOR SOLUTION INTRAMUSCULAR; INTRAVENOUS
Refills: 0 | Status: COMPLETED | OUTPATIENT
Start: 2020-07-08 | End: 2020-07-12

## 2020-07-08 RX ORDER — CEFTRIAXONE 500 MG/1
2000 INJECTION, POWDER, FOR SOLUTION INTRAMUSCULAR; INTRAVENOUS ONCE
Refills: 0 | Status: COMPLETED | OUTPATIENT
Start: 2020-07-08 | End: 2020-07-08

## 2020-07-08 RX ADMIN — CEFTRIAXONE 100 MILLIGRAM(S): 500 INJECTION, POWDER, FOR SOLUTION INTRAMUSCULAR; INTRAVENOUS at 12:38

## 2020-07-08 RX ADMIN — LIDOCAINE AND PRILOCAINE CREAM 1 APPLICATION(S): 25; 25 CREAM TOPICAL at 21:30

## 2020-07-08 RX ADMIN — BUMETANIDE 2 MILLIGRAM(S): 0.25 INJECTION INTRAMUSCULAR; INTRAVENOUS at 12:38

## 2020-07-08 RX ADMIN — BUMETANIDE 2 MILLIGRAM(S): 0.25 INJECTION INTRAMUSCULAR; INTRAVENOUS at 22:32

## 2020-07-08 NOTE — PROGRESS NOTE ADULT - SUBJECTIVE AND OBJECTIVE BOX
Chief Complaint:  Patient is a 29y old  Male who presents with a chief complaint of Acute decompensated heart failure (08 Jul 2020 10:02)      Interval Events: Yesterday patient underwent LVP, 3.8L removed. Cell counts consistent with SBP, started on CTX. Patient urinated well. On interview this morning, reported worsening R scrotal swelling, had been noted in outpatient setting and was planned for scrotal US and  follow up, but missed due to hospital admission.   Reports resp status is improved     Allergies:  No Known Allergies      Hospital Medications:  buMETAnide Injectable 2 milliGRAM(s) IV Push every 8 hours  cefTRIAXone   IVPB      cefTRIAXone   IVPB 2000 milliGRAM(s) IV Intermittent once  chlorhexidine 4% Liquid 1 Application(s) Topical <User Schedule>  digoxin     Tablet 0.125 milliGRAM(s) Oral daily  ferrous    sulfate 325 milliGRAM(s) Oral daily  heparin   Injectable 5000 Unit(s) SubCutaneous every 8 hours  melatonin 3 milliGRAM(s) Oral at bedtime  milrinone Infusion 0.125 MICROgram(s)/kG/Min IV Continuous <Continuous>  spironolactone 25 milliGRAM(s) Oral daily        PHYSICAL EXAM:   Vital Signs:  Vital Signs Last 24 Hrs  T(C): 36.4 (08 Jul 2020 08:00), Max: 37.3 (08 Jul 2020 00:00)  T(F): 97.5 (08 Jul 2020 08:00), Max: 99.2 (08 Jul 2020 00:00)  HR: 82 (08 Jul 2020 08:00) (73 - 96)  BP: 114/68 (08 Jul 2020 08:00) (97/72 - 135/61)  BP(mean): 77 (08 Jul 2020 08:00) (59 - 102)  RR: 17 (08 Jul 2020 08:00) (13 - 31)  SpO2: 93% (08 Jul 2020 08:00) (83% - 98%)  Daily     Daily     GENERAL:  No acute distress  HEENT:  Normocephalic/atraumtic,  no scleral icterus  CHEST:  Clear to auscultation bilaterally, no wheezes/rales/ronchi, no accessory muscle use  HEART:  Regular rate and rhythm, no murmurs/rubs/gallops  ABDOMEN:  Soft, non-tender, distended  : R scrotal swelling, nontender, reducible into abdomen, L groin inguinal hernia  EXTREMITIES:  No cyanosis, clubbing, or edema  SKIN:  No rash/erythema/ecchymoses/petechiae/wounds/abscess/warm/dry  NEURO:  Alert and oriented x 3, no asterixis, no tremor    LABS:  CBC: 7.8>9.9 (from 12)<258  BMP: 126/4.1/92/27/21/0.6/141  LFT: wnl    Ascites fluid:  Cell count: 13K RBC, 1522 WBC, 83%N    Ferritin: 254  TIBC: 205  IgG 797  IgA 113  IgM 3.4    Alpha-1 AT: 270    HAV: IgG positive  HBV: immune  HCV: neg

## 2020-07-08 NOTE — PROGRESS NOTE PEDS - SUBJECTIVE AND OBJECTIVE BOX
DISCHARGE CRITERIA:   - Off all IV medications   - Has a heart failure oral home regimen   - Peritoneal tap performed.   - Optimization of fluid balance     INTERVAL HISTORY:     RESPIRATORY SUPPORT: RA, sats ~88%-90%    NUTRITION: Sodium and Cholesterol restricted diet     INTAKE/OUTPUT:     @ 07:01  -   @ 07:00  --------------------------------------------------------  IN: 162.4 mL / OUT: 1925 mL / NET: -1762.6 mL      INTRAVASCULAR ACCESS: PIV     MEDICATIONS:  buMETAnide Injectable 2 milliGRAM(s) IV Push every 12 hours  digoxin     Tablet 0.125 milliGRAM(s) Oral daily  milrinone Infusion 0.125 MICROgram(s)/kG/Min IV Continuous <Continuous>  spironolactone 25 milliGRAM(s) Oral daily  melatonin 3 milliGRAM(s) Oral at bedtime  ferrous    sulfate 325 milliGRAM(s) Oral daily  heparin   Injectable 5000 Unit(s) SubCutaneous every 8 hours    PHYSICAL EXAMINATION:  Vital signs - Weight (kg): 69.2 ( @ 18:43)  T(C): 36.3 (20 @ 12:00), Max: 36.9 (20 @ 14:48)  HR: 88 (20 @ 12:00) (74 - 101)  BP: 108/53 (20 @ 12:00) (97/63 - 133/96)  RR: 20 (20 @ 12:00) (13 - 28)  SpO2: 92% (20 @ 12:00) (88% - 97%)    General - non-dysmorphic appearance, well-developed, in no distress.  Skin - no rash, no desquamation, no cyanosis.  Eyes / ENT - no conjunctival injection, sclerae anicteric, external ears & nares normal, mucous membranes moist.  Pulmonary - normal inspiratory effort, no retractions, lungs clear to auscultation bilaterally, no wheezes, no rales.  Cardiovascular - normal rate, regular rhythm, normal S1 & single S2, 2/6 holosystolic murmur at the LLSB and apex, no rubs, no gallops, capillary refill < 2sec, normal pulses.  Gastrointestinal - soft, non-distended, non-tender, no hepatomegaly (liver palpable *cm below right costal margin).  Musculoskeletal - no joint swelling, no clubbing, no edema.  Neurologic / Psychiatric - alert, oriented as age-appropriate, affect appropriate, moves all extremities, normal tone.    LABORATORY TESTS:                          11.2  CBC:   8.76 )-----------( 319   (20 @ 01:10)                          35.0               128   |  89    |  11                 Ca: 8.7    BMP:   ----------------------------< 135    M.0   (20 @ 01:10)             4.0    |  25    | 0.65               Ph: 3.6      LFT:     TPro: 5.7 / Alb: 2.9 / TBili: 0.6 / DBili: x / AST: 13 / ALT: 9 / AlkPhos: 133   (20 @ 01:10)    COAG: PT: 15.8 / PTT: 31.4 / INR: 1.36   (20 @ 06:00)     CARDIAC MARKERS:             High-Sensitivity Troponin: 11   (20 @ 06:00)             CK: x / CKMB: x   (20 @ 06:00)             Pro-BNP: x   (20 @ 06:00)  CARDIAC MARKERS:             High-Sensitivity Troponin: 11   (20 @ 15:57)             CK: x / CKMB: x   (20 @ 15:57)             Pro-BNP: 772.7   (20 @ 15:57)      VBG:   pH: 7.49 / pCO2: 37 / pO2: 39 / HCO3: 28 / Base Excess: 4.1 / SaO2: 71.5   (20 @ 15:57)    IMAGING STUDIES:  Electrocardiogram - 2020. Sinus rhythm at 92 bpm with a prolonged [place] interval (230 msec). Complete Right Bundle Branch Block.  Chest x-ray - (20) Left pleural effusion     Echocardiogram -   2020. (Please see full report for more details)   The right (single ventricle) function is severely diminished as compared to the study of 2020.  There is phasic flow in the IVC to Fontan circuit and the right sided Justice shunt is not clearly defined but there does not appear to be any obstruction to flow.  The pulmonary arteries continue to be non-visualized well. The study from 2020 showed sinus rhythm with a very prolonged [place] interval. Wenkebach periodicity was also noted and there were rare PVCs an couplets. DISCHARGE CRITERIA:   - Off all IV medications   - Has a heart failure oral home regimen   - Peritoneal tap performed.   - Optimization of fluid balance     INTERVAL HISTORY: s/p peritoneal tap yesterday and 4 L fluids was drained. Responding well to diuretics this am with net fluid balance -1700. Continues on Bumex 2 mg IV q 12. Peritoneal fluid suggestive of SBE therefore, started on antibiotics. Continues to be afebrile.     RESPIRATORY SUPPORT: RA, sats ~88%-90%    NUTRITION: Sodium and Cholesterol restricted diet     INTAKE/OUTPUT:     @ 07:01  -  - @ 07:00  --------------------------------------------------------  IN: 162.4 mL / OUT: 1925 mL / NET: -1762.6 mL      INTRAVASCULAR ACCESS: PIV     MEDICATIONS:  buMETAnide Injectable 2 milliGRAM(s) IV Push every 12 hours  digoxin     Tablet 0.125 milliGRAM(s) Oral daily  milrinone Infusion 0.125 MICROgram(s)/kG/Min IV Continuous <Continuous>  spironolactone 25 milliGRAM(s) Oral daily  melatonin 3 milliGRAM(s) Oral at bedtime  ferrous    sulfate 325 milliGRAM(s) Oral daily  heparin   Injectable 5000 Unit(s) SubCutaneous every 8 hours    PHYSICAL EXAMINATION:  ICU Vital Signs Last 24 Hrs  T(C): 36.1 (2020 16:00), Max: 37.3 (2020 00:00)  T(F): 96.9 (2020 16:00), Max: 99.2 (2020 00:00)  HR: 88 (2020 16:01) (73 - 94)  BP: 111/66 (2020 16:00) (97/72 - 135/61)  BP(mean): 75 (2020 16:00) (59 - 102)  RR: 26 (2020 16:01) (13 - 31)  SpO2: 98% (2020 16:01) (83% - 98%)  General - non-dysmorphic appearance, well-developed, in no distress.  Skin - no rash, no desquamation, no cyanosis.  Eyes / ENT - no conjunctival injection, sclerae anicteric, external ears & nares normal, mucous membranes moist.  Pulmonary - normal inspiratory effort, no retractions, lungs clear to auscultation bilaterally, no wheezes, no rales.  Cardiovascular - normal rate, regular rhythm, normal S1 & single S2, 2/6 holosystolic murmur at the LLSB and apex, no rubs, no gallops, capillary refill < 2sec, normal pulses.  Gastrointestinal - soft, distended, non-tender, hepatomegaly difficult to assess because of ascities.  Musculoskeletal - no joint swelling, no clubbing, no edema.  Neurologic / Psychiatric - alert, oriented as age-appropriate, affect appropriate, moves all extremities, normal tone.    LABORATORY TESTS:                            11.2  CBC:   6.92 )-----------( 270   (20 @ 12:55)                          34.6               131   |  93    |  14                 Ca: 8.6    BMP:   ----------------------------< 91     M.1   (20 @ 12:55)             4.0    |  32    | 0.66               Ph: 3.7      LFT:     TPro: 5.4 / Alb: 2.8 / TBili: 0.3 / DBili: x / AST: 13 / ALT: 8 / AlkPhos: 127   (20 @ 12:55)      IMAGING STUDIES:  Electrocardiogram - 2020. Sinus rhythm at 92 bpm with a prolonged [place] interval (230 msec). Complete Right Bundle Branch Block.  Tele: NSR with Wenkebach, No ectopy or arrhythmias.   Chest x-ray - (20) Left pleural effusion     Echocardiogram -   2020. (Please see full report for more details)   The right (single ventricle) function is severely diminished as compared to the study of 2020.  There is phasic flow in the IVC to Fontan circuit and the right sided Justice shunt is not clearly defined but there does not appear to be any obstruction to flow.  The pulmonary arteries continue to be non-visualized well. The study from 2020 showed sinus rhythm with a very prolonged [place] interval. Wenkebach periodicity was also noted and there were rare PVCs an couplets.

## 2020-07-08 NOTE — PROGRESS NOTE ADULT - ASSESSMENT
Impression:  30 yo M w/ PMHx hypoplastic L heart s/p José procedure, GIB (1/2020) presenting w/ acute on chronic ADHF and worsening ascites, hepatology consulted for ascites management, pt now s/p LVP w/ SBP.     Etiology of patient's worsening ascites likely from congestive hepatopathy and subsequent cirrhosis. However difficult to determine whether ascites is entirely attributed to heart failure and congestive hepatopathy, or whether prolonged liver damage and cirrhosis have led to independent liver fibrosis/cirrhosis. Understanding this can help guide Mr. Acevedo's candidacy for heart transplant. To evaluate his liver fibrosis and/or portal pressures, we can recommend three potential options. First would be assessment of portal pressures, which can be done during a cardiac catheterization, by measuring wedged hepatic venous pressures. This can be coordinated either by interventional cardiology or interventional radiology at time of cardiac catheterization. Second, to assess level of fibrosis, as an outpatient Mr. Acevedo can undergo MR elastography (not done as inpatient). He either way warrants liver imaging to assess for mass lesion, and therefore can undergo MRI liver/ MR elastography as outpatient. Third, and most invasive, would be a liver biopsy to assess level of fibrosis.     Additionally, patient's ascites fluid was positive for SBP by cell count. It is not uncommon for hospitalized patients with ascites to have asymptomatic SBP, and it may reflect transient bacterial translocation across bowel mucosa. His prior GIB (2/2020) which demonstrated duodenitis and was treated with APC likely does not explain the current SBP. He may have developed new bowel inflammation (colitis or duodenetis) to explain the SBP, or there may be no clear precipitant for the SBP. Nevertheless, we will recommend further imaging to evaluate the bowel walls, as well as recommend treatment for SBP. Timing of further cardiac intervention as it related to SBP treatment to be coordinated by cardiology.     Recommendations:  - treatment for SBP: c/w CTX daily, would recommend albumin 1.5g/kg today, and 1g/kg on 7/10 for renal protection in setting of SBP  - would recommend repeat paracentesis on 7/10 to assess improvement in cell count and response to abx therapy  - would recommend CT A/P with PO and IV contrast to assess bowel wall for colitis/duodenitis as possible source for SBP  - f/u serology labs:  HAV, HBVsAb, HBVsAg, HCV Ab, ceruloplasm, alpha-1 antitrypsin, AFP, iron sat/ferritin/TIBC, immunoglobulin panel (IgG, IgA, IgM), MICHELLE, anti-smooth muscle, anti-mitochondrial, Anti-liver kidney microsomal antibody  - for worsening R scrotal swelling, would recommend scrotal ultrasound. Can consider  consult  - evaluate of fibrosis as described above  - evaluate portal pressures as described above  - timing of cardiac cath as per cardiology, likely wait until SBP treated  - diuretics management per MICU/cardiology    Thank you for this interesting consult. Hepatology will continue to follow.     Sylvester Mccray, PGY4  Gastroenterology Fellow  Available on [employer] Teams  [phone] (Long Range Pager)    After 5pm, please contact the on-call GI fellow. [phone] Impression:  28 yo M w/ PMHx hypoplastic L heart s/p Fontan procedure, GIB (1/2020) presenting w/ acute on chronic ADHF and worsening ascites, hepatology consulted for ascites management, pt now s/p LVP w/ SBP.     Etiology of patient's worsening ascites likely from congestive hepatopathy and subsequent cirrhosis. However difficult to determine whether ascites is entirely attributed to heart failure and congestive hepatopathy, or whether prolonged liver damage and cirrhosis have led to independent liver fibrosis/cirrhosis. Understanding this can help guide Mr. Acevedo's candidacy for heart transplant. To evaluate his liver fibrosis and/or portal pressures, we can recommend three potential options. First would be assessment of portal pressures, which can be done during a cardiac catheterization, by measuring wedged hepatic venous pressures. This can be coordinated either by interventional cardiology or interventional radiology at time of cardiac catheterization. Second, to assess level of fibrosis, as an outpatient Mr. Acevedo can undergo MR elastography (not done as inpatient). He either way warrants liver imaging to assess for mass lesion, and therefore can undergo MRI liver/ MR elastography as outpatient. Third, and most invasive, would be a liver biopsy to assess level of fibrosis.     Additionally, patient's ascites fluid was positive for SBP by cell count. It is not uncommon for hospitalized patients with ascites to have asymptomatic SBP, and it may reflect transient bacterial translocation across bowel mucosa. His prior GIB (2/2020) which demonstrated duodenitis and was treated with APC likely does not explain the current SBP. He may have developed new bowel inflammation (colitis or duodenetis) to explain the SBP, or there may be no clear precipitant for the SBP. Nevertheless, we will recommend further imaging to evaluate the bowel walls, as well as recommend treatment for SBP. Timing of further cardiac intervention as it related to SBP treatment to be coordinated by cardiology.     Recommendations:  - treatment for SBP: c/w CTX daily, would recommend albumin 1.5g/kg today, and 1g/kg on 7/10 for renal protection in setting of SBP  - would recommend repeat paracentesis on 7/10 to assess improvement in cell count and response to abx therapy  - would recommend CT A/P with PO and IV contrast to assess bowel wall for colitis/duodenitis as possible source for SBP  - f/u serology labs:  HAV, HBVsAb, HBVsAg, HCV Ab, ceruloplasm, alpha-1 antitrypsin, AFP, iron sat/ferritin/TIBC, immunoglobulin panel (IgG, IgA, IgM), MICHELLE, anti-smooth muscle, anti-mitochondrial, Anti-liver kidney microsomal antibody  - for worsening R scrotal swelling, would recommend scrotal ultrasound. Can consider  consult  - evaluate of fibrosis as described above  - evaluate portal pressures as described above  - timing of cardiac cath as per cardiology, likely wait until SBP treated  - diuretics management per MICU/cardiology    Thank you for this interesting consult. Hepatology will continue to follow.     Sylvester Mccray, PGY4  Gastroenterology Fellow  Available on [employer] Teams  [phone] (Long Range Pager)    After 5pm, please contact the on-call GI fellow. [phone]

## 2020-07-08 NOTE — PROGRESS NOTE ADULT - ASSESSMENT
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  Sent from clinic for IV diuresis and milrinone therapy.      #Neuro   - No acute issues    #Cardiovascular  Acute Decompensated Heart Failure   - continue diuresis with Bumex 2mg q8h and home aldactone   - continue milrinone gtt   - Continue home digoxin   - HOLD home lisinopril per Congenital Heart team   - Breathing more comfortable after paracentesis   - LHC arrangements per Peds Congenital Heart team   - Per Peds Cards, CXR today and will send alpha-1 antitrypsin stool sample    #Respiratory   - Patient baseline sat 88%   - Currently satting 95% on room air    #Gastrointestinal  Spontaneous bacterial peritonitis   - Peritoneal fluid analysis demonstrates PMNs 1211, gram stain with PMNs no organism seen.  Diagnostic for SBP.  Patient remains asymptomatic, no fevers, no WBC elevation, no abdominal pain   - Ceftriaxone 2g q24, 5 day course ending 7/12  Ascites   - Likely secondary to heart failure vs cirrhosis   - SAAG 0.7, glucose 113, , protein 3.6 all consistent with ascites from heart failure  Gastritis   - Continue home protonix   - History of GI bleed    #Renal   - Will diurese with Bumex gtt and aldactone   - Baseline Cr appears to be ~0.8    #Infectious Disease  Spontaneous Bacterial Peritonitis   - as above, ceftriaxone 2g q24 ending 7/12    #Endo   - No acute issues    #Heme  Anemia   - Has baseline anemia usually ~13.0   - Current hgb 9.9; all cell lines down including plts and wbc   - Will get CBC q12 to monitor, and have active type and screen   - Will do anemia workup including iron, TIBC, ferritin, folate, b12, cbc, retic

## 2020-07-08 NOTE — PROGRESS NOTE ADULT - ATTENDING COMMENTS
This patient has developed ascites and now found to have infected ascites.  Given treatment with cautery for duodenal bleeding in March 2020, suggest abdominal/pelvic CT scan to assess santino-duodenal area for potential infectious focus. Repeat paracentesis in 48 hours would assure proper response to antibiotics.  Hold cardiac cath until infection resolved. Suggest  evaluation of scrotum enlargement.    To accurately assess hepatic fibrosis/cirrhosis patient could have wedged hepatic venous pressure measurement and trans jugular liver biopsy.  This would give most reliable measure of cirrhosis.  noninvasive measures can be used for preliminary assessment.

## 2020-07-08 NOTE — PROGRESS NOTE ADULT - SUBJECTIVE AND OBJECTIVE BOX
CHIEF COMPLAINT: Patient is a 29y old  Male who presents with a chief complaint of Acute decompensated heart failure (08 Jul 2020 12:16)    Interval Events:  Overnight diuresed well, net -1.7L plus 4L from paracentesis.  Feels well, more comfortable sleeping while reclining.      REVIEW OF SYSTEMS:  In additional the that documented in the HPI, the additional ROS was obtained:    CONSTITUTIONAL: No fever, no chills  EYES: no change in vision, no blurred vision  HEENT: no trouble swallowing, no trouble speaking, no sore throat  NECK: no pain, no stiffness  CV: no chest pain, no palpitations  RESP: no cough, no shortness of breath  GI: no abdominal pain, no nausea, no vomiting, no diarrhea, no constipation, no BRBPR or melena  : No dysuria, no frequency; + L sided inguinal pain not associated with urination thinks related to hydrocele  NEURO: no headache, no new numbness, no weakness, no dizziness  SKIN: no new rashes  HEME: No easy bruising or bleeding  MSK: No joint pain, no recent trauma  Tariq Abernathy M.D. -Resident     OBJECTIVE:  ICU Vital Signs Last 24 Hrs  T(C): 36.4 (08 Jul 2020 08:00), Max: 37.3 (08 Jul 2020 00:00)  T(F): 97.5 (08 Jul 2020 08:00), Max: 99.2 (08 Jul 2020 00:00)  HR: 82 (08 Jul 2020 08:00) (73 - 96)  BP: 114/68 (08 Jul 2020 08:00) (97/72 - 135/61)  BP(mean): 77 (08 Jul 2020 08:00) (59 - 102)  ABP: --  ABP(mean): --  RR: 17 (08 Jul 2020 08:00) (13 - 31)  SpO2: 93% (08 Jul 2020 08:00) (83% - 98%)        07-07 @ 07:01  -  07-08 @ 07:00  --------------------------------------------------------  IN: 162.4 mL / OUT: 1925 mL / NET: -1762.6 mL    07-08 @ 07:01  -  07-08 @ 12:41  --------------------------------------------------------  IN: 10.4 mL / OUT: 0 mL / NET: 10.4 mL      CAPILLARY BLOOD GLUCOSE    PHYSICAL EXAM:  Vital signs reviewed.  CONSTITUTIONAL: NAD, awake, interactive, sitting in chair eating breakfast  HEAD: Normocephalic; atraumatic  EYES: EOMI, no nystagmus, no conjunctival injection, no scleral icterus  MOUTH/THROAT:  MMM  NECK: Trachea midline, no JVD  CV: Normal S1, S2; no audible murmurs; extremities WWP  RESP: normal work of breathing; CTAB, no stridor  ABD: soft, distended but smaller than yesterday, nontender  : swollen R testicle without pain, L scrotum less swelling but some pain in inguinal region.  MSK/EXT: no edema, +prominent vasculature on legs b/l.  normal ROM in all four extremities, no deformities  SKIN: No gross rashes or lesions on exposed skin, no significant trophic changes  NEURO: aaox3, moving all extremities purposefully and spontaneously     HOSPITAL MEDICATIONS:  MEDICATIONS  (STANDING):  buMETAnide Injectable 2 milliGRAM(s) IV Push every 8 hours  cefTRIAXone   IVPB      cefTRIAXone   IVPB 2000 milliGRAM(s) IV Intermittent once  chlorhexidine 4% Liquid 1 Application(s) Topical <User Schedule>  digoxin     Tablet 0.125 milliGRAM(s) Oral daily  ferrous    sulfate 325 milliGRAM(s) Oral daily  heparin   Injectable 5000 Unit(s) SubCutaneous every 8 hours  melatonin 3 milliGRAM(s) Oral at bedtime  milrinone Infusion 0.125 MICROgram(s)/kG/Min (2.6 mL/Hr) IV Continuous <Continuous>  spironolactone 25 milliGRAM(s) Oral daily    MEDICATIONS  (PRN):      LABS:  (07-08 @ 03:19)                        9.9  7.86 )-----------( 258                 30.0    Neutrophils = 6.52 (83.0%)  Lymphocytes = 0.44 (5.6%)  Eosinophils = 0.08 (1.0%)  Basophils = 0.05 (0.6%)  Monocytes = 0.73 (9.3%)  Bands = --%    WBC Trend: 7.86<--, 8.76<--, 11.02<--  Hb Trend: 9.9<--, 11.2<--, 12.3<--  Plt Trend: 258<--, 319<--, 404<--  07-08    126<L>  |  92<L>  |  21  ----------------------------<  141<H>  4.1   |  27  |  0.69    Ca    8.4      08 Jul 2020 03:19  Phos  4.4     07-08  Mg     2.1     07-08    TPro  5.1<L>  /  Alb  2.5<L>  /  TBili  0.3  /  DBili  x   /  AST  10  /  ALT  8   /  AlkPhos  127<H>  07-08    Creatinine Trend: 0.69<--, 0.65<--, 0.65<--  PT/INR - ( 07 Jul 2020 06:00 )   PT: 15.8 SEC;   INR: 1.36     PTT - ( 07 Jul 2020 06:00 )  PTT:31.4 SEC    Venous Blood Gas:  07-06 @ 15:57  7.49/37/39/28/71.5  VBG Lactate: 1.2    MICROBIOLOGY:   Peritoneal Cx: pending    RADIOLOGY:  Ultrasound: bedside u/s abdomen shows no layering of fluid, no hematocrit sign.  Some ascites not enough for therapeutic paracentesis.  Reviewed and interpreted by me

## 2020-07-08 NOTE — PROGRESS NOTE PEDS - ASSESSMENT
To summarize, Arie is a 28 y/o with history of HLHS s/p Fontan palliation. The 3 stage repair was performed at [facility]. Extracardiac Fontan completion was performed in 1997 by Dr Bennett followed by device closure of the Fenestration is 1999 with cardiac cath. He had been lost to follow up for several years and last year started following up with our adult congenital team. Unfortunately his presentation is consistent with failing Fontan with worsening congestive heart failure in the form of severe ascites, and significant diminution of his single ventricle function. He is admitted for optimization of the heart failure management and peritoneal tap. Because of the complex physiology of single ventricle and his tenuous hemodynamic state, he needs continuous ICU monitoring.   Plan   CV:   - Continuous tele monitoring. Please page Cardiology if there is concern of any arrhythmias.   - Continue Bumex 2 mg IV q 12 hr for now. He is responding appropriately to it. Fluid goal at least negative ~ 1 L/day. Continue Aldactone.   - Continue milrinone at 0.1 mc/kg/min. Continue the current dose of digoxin, may need digoxin dose to be increased before discharge.   - Plan for a cardiac cath during this admission.   - Repeat echo tomorrow to re-assess cardiac function. To be performed by Pediatric Cardiology service.   Renal:   - Strict I's and O's.  - Will continue to monitor electrolytes and BUN/Cr   Resp:   - On RA, goal sats >88%. Uses O2 as needed for comfort.   GI:   - Plan for peritoneal tap this afternoon.   - Hepatology consulted and recommended labs and MRI elastography to rule out fibrosis. Appreciate the recs. To summarize, Arie is a 28 y/o with history of HLHS s/p Fontan palliation. The 3 stage repair was performed at [facility]. Extracardiac Fontan completion was performed in 1997 by Dr Bennett followed by device closure of the Fenestration is 1999 with cardiac cath. He had been lost to follow up for several years and last year started following up with our adult congenital team. Unfortunately his presentation is consistent with failing Fontan with worsening congestive heart failure in the form of severe ascites, and significant diminution of his single ventricle function. He is admitted for optimization of the heart failure management and peritoneal tap. Because of the complex physiology of single ventricle and his tenuous hemodynamic state, he needs continuous ICU monitoring.   Plan   CV:   - Continuous tele monitoring. Please page Cardiology if there is concern of any arrhythmias.   - Continue Bumex 2 mg IV q 12 hr for now. He is responding appropriately to it. Fluid goal at least negative ~ 1 L/day. Continue Aldactone.   - Continue milrinone at 0.1 mc/kg/min. Continue the current dose of digoxin, may need digoxin dose to be increased before discharge.   - Because of the active infection, cardiac cath will be deferred for now.   - Sildenafil has not been started because of the borderline low BP's at night, in the setting of an active infection also.   - Recommend to start low dose nor-epi drip because of borderline low BP's in the setting of active infection, even though patient clinically appears well. Assess urine output, perfusion and monitor BP's closely after nor epi is started.   Renal/:   - Strict I's and O's.  - Will continue to monitor electrolytes and BUN/Cr   - Scrotal US ordered, f/u on results and consult urology depending upon results.   Resp:   - On RA, goal sats >88%. Uses O2 as needed for comfort.   GI:   - Plan for peritoneal tap this afternoon.   - Hepatology consulted and recommended labs and MRI elastography to rule out fibrosis. Appreciate the recs.   - We would recommend to do abdominal CT to rule out micro-perforations, in the light of SBP infection. To summarize, Arie is a 30 y/o with history of HLHS s/p Fontan palliation. The 3 stage repair was performed at [facility]. Extracardiac Fontan completion was performed in 1997 by Dr Bennett followed by device closure of the Fenestration is 1999 with cardiac cath. He had been lost to follow up for several years and last year started following up with our adult congenital team. Unfortunately his presentation is consistent with failing Fontan with worsening congestive heart failure in the form of severe ascites, and significant diminution of his single ventricle function. He is admitted for optimization of the heart failure management and peritoneal tap. Because of the complex physiology of single ventricle and his tenuous hemodynamic state, he needs continuous ICU monitoring.   Plan   CV:   - Continuous tele monitoring. Please page Cardiology if there is concern of any arrhythmias.   - Continue Bumex 2 mg IV q 12 hr for now. He is responding appropriately to it. Fluid goal at least negative ~ 1 L/day. Continue Aldactone.   - Continue milrinone at 0.1 mc/kg/min. Continue the current dose of digoxin, may need digoxin dose to be increased before discharge.   - Because of the active infection, cardiac cath will be deferred for now.   - Sildenafil has not been started because of the borderline low BP's at night, in the setting of an active infection also.   - Recommend to start low dose nor-epi drip because of borderline low BP's in the setting of active infection, even though patient clinically appears well. Assess urine output, perfusion and monitor BP's closely after nor epi is started.   Renal/:   - Strict I's and O's.  - Will continue to monitor electrolytes and BUN/Cr   - Scrotal US ordered, f/u on results and consult urology depending upon results.   Resp:   - On RA, goal sats >88%. Uses O2 as needed for comfort.   GI:   - s/p peritoneal tap.   - Peritoneal fluid analysis suggestive of SBP, started on Ceftriaxone.   - Hepatology consulted and recommended labs and MRI elastography to rule out fibrosis. Appreciate the recs.   - We would recommend to do abdominal CT to rule out micro-perforations, in the light of SBP infection.

## 2020-07-08 NOTE — INPATIENT CERTIFICATION FOR MEDICARE PATIENTS - RISKS OF ADVERSE EVENTS
Concern for renal deterioration/Concern for worsening infectious process/Concern for cardiopulmonary deterioration

## 2020-07-08 NOTE — PROGRESS NOTE PEDS - SUBJECTIVE AND OBJECTIVE BOX
INTERVAL HISTORY: *    RESPIRATORY SUPPORT:     NUTRITION: * (*kcal/kg/day)    INTAKE/OUTPUT:   @ 07:01  -   @ 07:00  --------------------------------------------------------  IN: 162.4 mL / OUT: 1925 mL / NET: -1762.6 mL    CHEST TUBE OUTPUT: * mL/24h, * mL/12h    INTRAVASCULAR ACCESS: *    MEDICATIONS:  digoxin     Tablet 0.125 milliGRAM(s) Oral daily  milrinone Infusion 0.125 MICROgram(s)/kG/Min IV Continuous <Continuous>  spironolactone 25 milliGRAM(s) Oral daily  cefTRIAXone   IVPB      cefTRIAXone   IVPB 1000 milliGRAM(s) IV Intermittent every 24 hours  melatonin 3 milliGRAM(s) Oral at bedtime  ferrous    sulfate 325 milliGRAM(s) Oral daily  heparin   Injectable 5000 Unit(s) SubCutaneous every 8 hours    PHYSICAL EXAMINATION:  Vital signs - Weight (kg): 69.2 ( @ 18:43)  T(C): 36.8 (20 @ 04:00), Max: 37.3 (20 @ 00:00)  HR: 82 (20 @ 08:00) (73 - 96)  BP: 114/68 (20 @ 08:00) (97/72 - 135/61)  ABP: --  RR: 17 (20 @ 08:00) (13 - 31)  SpO2: 93% (20 @ 08:00) (83% - 98%)  CVP(mm Hg): --  General - non-dysmorphic appearance, well-developed, in no distress.  Skin - no rash, no desquamation, no cyanosis.  Eyes / ENT - no conjunctival injection, sclerae anicteric, external ears & nares normal, mucous membranes moist.  Pulmonary - normal inspiratory effort, no retractions, lungs clear to auscultation bilaterally, no wheezes, no rales.  Cardiovascular - normal rate, regular rhythm, normal S1 & S2, no murmurs, no rubs, no gallops, capillary refill < 2sec, normal pulses.  Gastrointestinal - soft, non-distended, non-tender, no hepatomegaly (liver palpable *cm below right costal margin).  Musculoskeletal - no joint swelling, no clubbing, no edema.  Neurologic / Psychiatric - alert, oriented as age-appropriate, affect appropriate, moves all extremities, normal tone.    LABORATORY TESTS:                          9.9  CBC:   7.86 )-----------( 258   (20 @ 03:19)                          30.0               126   |  92    |  21                 Ca: 8.4    BMP:   ----------------------------< 141    M.1   (20 @ 03:19)             4.1    |  27    | 0.69               Ph: 4.4      LFT:     TPro: 5.1 / Alb: 2.5 / TBili: 0.3 / DBili: x / AST: 10 / ALT: 8 / AlkPhos: 127   (20 @ 03:19)    COAG: PT: 15.8 / PTT: 31.4 / INR: 1.36   (20 @ 06:00)     CARDIAC MARKERS:             High-Sensitivity Troponin: 11   (20 @ 06:00)             CK: x / CKMB: x   (20 @ 06:00)             Pro-BNP: x   (20 @ 06:00)  CARDIAC MARKERS:             High-Sensitivity Troponin: 11   (20 @ 15:57)             CK: x / CKMB: x   (20 @ 15:57)             Pro-BNP: 772.7   (20 @ 15:57)        VBG:   pH: 7.49 / pCO2: 37 / pO2: 39 / HCO3: 28 / Base Excess: 4.1 / SaO2: 71.5   (20 @ 15:57)    IMAGING STUDIES:  Electrocardiogram - (*date)      Telemetry - (*dates) normal sinus rhythm, no ectopy, no arrhythmias.    Chest x-ray - (*date)     Echocardiogram - (*date)     Other - (*date)

## 2020-07-08 NOTE — PROGRESS NOTE ADULT - ATTENDING COMMENTS
Agree with above. Seen and examined with team on rounds. 4 L of ascites removed yesterday. Fluid diagnostic for SBP, now on ceftriaxone. Supportive care. Continue milrinone and Bumex.

## 2020-07-09 LAB
A1AT SERPL-MCNC: 257 MG/DL — HIGH (ref 90–200)
ALBUMIN SERPL ELPH-MCNC: 2.6 G/DL — LOW (ref 3.3–5)
ALBUMIN SERPL ELPH-MCNC: 2.7 G/DL — LOW (ref 3.3–5)
ALP SERPL-CCNC: 120 U/L — SIGNIFICANT CHANGE UP (ref 40–120)
ALP SERPL-CCNC: 135 U/L — HIGH (ref 40–120)
ALT FLD-CCNC: 12 U/L — SIGNIFICANT CHANGE UP (ref 4–41)
ALT FLD-CCNC: 9 U/L — SIGNIFICANT CHANGE UP (ref 4–41)
ANA TITR SER: NEGATIVE — SIGNIFICANT CHANGE UP
ANION GAP SERPL CALC-SCNC: 13 MMO/L — SIGNIFICANT CHANGE UP (ref 7–14)
ANION GAP SERPL CALC-SCNC: 9 MMO/L — SIGNIFICANT CHANGE UP (ref 7–14)
AST SERPL-CCNC: 11 U/L — SIGNIFICANT CHANGE UP (ref 4–40)
AST SERPL-CCNC: 29 U/L — SIGNIFICANT CHANGE UP (ref 4–40)
BASOPHILS # BLD AUTO: 0.05 K/UL — SIGNIFICANT CHANGE UP (ref 0–0.2)
BASOPHILS # BLD AUTO: 0.05 K/UL — SIGNIFICANT CHANGE UP (ref 0–0.2)
BASOPHILS NFR BLD AUTO: 0.6 % — SIGNIFICANT CHANGE UP (ref 0–2)
BASOPHILS NFR BLD AUTO: 0.7 % — SIGNIFICANT CHANGE UP (ref 0–2)
BILIRUB SERPL-MCNC: 0.2 MG/DL — SIGNIFICANT CHANGE UP (ref 0.2–1.2)
BILIRUB SERPL-MCNC: 0.2 MG/DL — SIGNIFICANT CHANGE UP (ref 0.2–1.2)
BUN SERPL-MCNC: 22 MG/DL — SIGNIFICANT CHANGE UP (ref 7–23)
BUN SERPL-MCNC: 22 MG/DL — SIGNIFICANT CHANGE UP (ref 7–23)
CALCIUM SERPL-MCNC: 8.4 MG/DL — SIGNIFICANT CHANGE UP (ref 8.4–10.5)
CALCIUM SERPL-MCNC: 8.5 MG/DL — SIGNIFICANT CHANGE UP (ref 8.4–10.5)
CHLORIDE SERPL-SCNC: 91 MMOL/L — LOW (ref 98–107)
CHLORIDE SERPL-SCNC: 92 MMOL/L — LOW (ref 98–107)
CO2 SERPL-SCNC: 25 MMOL/L — SIGNIFICANT CHANGE UP (ref 22–31)
CO2 SERPL-SCNC: 27 MMOL/L — SIGNIFICANT CHANGE UP (ref 22–31)
CREAT SERPL-MCNC: 0.7 MG/DL — SIGNIFICANT CHANGE UP (ref 0.5–1.3)
CREAT SERPL-MCNC: 0.77 MG/DL — SIGNIFICANT CHANGE UP (ref 0.5–1.3)
EOSINOPHIL # BLD AUTO: 0.04 K/UL — SIGNIFICANT CHANGE UP (ref 0–0.5)
EOSINOPHIL # BLD AUTO: 0.07 K/UL — SIGNIFICANT CHANGE UP (ref 0–0.5)
EOSINOPHIL NFR BLD AUTO: 0.5 % — SIGNIFICANT CHANGE UP (ref 0–6)
EOSINOPHIL NFR BLD AUTO: 1 % — SIGNIFICANT CHANGE UP (ref 0–6)
GLUCOSE SERPL-MCNC: 110 MG/DL — HIGH (ref 70–99)
GLUCOSE SERPL-MCNC: 157 MG/DL — HIGH (ref 70–99)
HBV CORE AB SER-ACNC: NONREACTIVE — SIGNIFICANT CHANGE UP
HBV CORE IGM SER-ACNC: NONREACTIVE — SIGNIFICANT CHANGE UP
HCT VFR BLD CALC: 29.2 % — LOW (ref 39–50)
HCT VFR BLD CALC: 31.4 % — LOW (ref 39–50)
HGB BLD-MCNC: 10.4 G/DL — LOW (ref 13–17)
HGB BLD-MCNC: 9.7 G/DL — LOW (ref 13–17)
IMM GRANULOCYTES NFR BLD AUTO: 0.4 % — SIGNIFICANT CHANGE UP (ref 0–1.5)
IMM GRANULOCYTES NFR BLD AUTO: 0.5 % — SIGNIFICANT CHANGE UP (ref 0–1.5)
LYMPHOCYTES # BLD AUTO: 0.48 K/UL — LOW (ref 1–3.3)
LYMPHOCYTES # BLD AUTO: 0.48 K/UL — LOW (ref 1–3.3)
LYMPHOCYTES # BLD AUTO: 6 % — LOW (ref 13–44)
LYMPHOCYTES # BLD AUTO: 6.6 % — LOW (ref 13–44)
MAGNESIUM SERPL-MCNC: 1.8 MG/DL — SIGNIFICANT CHANGE UP (ref 1.6–2.6)
MAGNESIUM SERPL-MCNC: 2 MG/DL — SIGNIFICANT CHANGE UP (ref 1.6–2.6)
MCHC RBC-ENTMCNC: 26.1 PG — LOW (ref 27–34)
MCHC RBC-ENTMCNC: 26.3 PG — LOW (ref 27–34)
MCHC RBC-ENTMCNC: 33.1 % — SIGNIFICANT CHANGE UP (ref 32–36)
MCHC RBC-ENTMCNC: 33.2 % — SIGNIFICANT CHANGE UP (ref 32–36)
MCV RBC AUTO: 78.7 FL — LOW (ref 80–100)
MCV RBC AUTO: 79.1 FL — LOW (ref 80–100)
MONOCYTES # BLD AUTO: 0.69 K/UL — SIGNIFICANT CHANGE UP (ref 0–0.9)
MONOCYTES # BLD AUTO: 0.7 K/UL — SIGNIFICANT CHANGE UP (ref 0–0.9)
MONOCYTES NFR BLD AUTO: 8.6 % — SIGNIFICANT CHANGE UP (ref 2–14)
MONOCYTES NFR BLD AUTO: 9.6 % — SIGNIFICANT CHANGE UP (ref 2–14)
NEUTROPHILS # BLD AUTO: 5.99 K/UL — SIGNIFICANT CHANGE UP (ref 1.8–7.4)
NEUTROPHILS # BLD AUTO: 6.75 K/UL — SIGNIFICANT CHANGE UP (ref 1.8–7.4)
NEUTROPHILS NFR BLD AUTO: 81.7 % — HIGH (ref 43–77)
NEUTROPHILS NFR BLD AUTO: 83.8 % — HIGH (ref 43–77)
NRBC # FLD: 0 K/UL — SIGNIFICANT CHANGE UP (ref 0–0)
NRBC # FLD: 0 K/UL — SIGNIFICANT CHANGE UP (ref 0–0)
PHOSPHATE SERPL-MCNC: 3.9 MG/DL — SIGNIFICANT CHANGE UP (ref 2.5–4.5)
PHOSPHATE SERPL-MCNC: 4.5 MG/DL — SIGNIFICANT CHANGE UP (ref 2.5–4.5)
PLATELET # BLD AUTO: 262 K/UL — SIGNIFICANT CHANGE UP (ref 150–400)
PLATELET # BLD AUTO: 315 K/UL — SIGNIFICANT CHANGE UP (ref 150–400)
PMV BLD: 9.6 FL — SIGNIFICANT CHANGE UP (ref 7–13)
PMV BLD: 9.7 FL — SIGNIFICANT CHANGE UP (ref 7–13)
POTASSIUM SERPL-MCNC: 3.9 MMOL/L — SIGNIFICANT CHANGE UP (ref 3.5–5.3)
POTASSIUM SERPL-MCNC: 4.6 MMOL/L — SIGNIFICANT CHANGE UP (ref 3.5–5.3)
POTASSIUM SERPL-SCNC: 3.9 MMOL/L — SIGNIFICANT CHANGE UP (ref 3.5–5.3)
POTASSIUM SERPL-SCNC: 4.6 MMOL/L — SIGNIFICANT CHANGE UP (ref 3.5–5.3)
PROT SERPL-MCNC: 5 G/DL — LOW (ref 6–8.3)
PROT SERPL-MCNC: 5.7 G/DL — LOW (ref 6–8.3)
RBC # BLD: 3.69 M/UL — LOW (ref 4.2–5.8)
RBC # BLD: 3.99 M/UL — LOW (ref 4.2–5.8)
RBC # FLD: 17.2 % — HIGH (ref 10.3–14.5)
RBC # FLD: 17.3 % — HIGH (ref 10.3–14.5)
SODIUM SERPL-SCNC: 128 MMOL/L — LOW (ref 135–145)
SODIUM SERPL-SCNC: 129 MMOL/L — LOW (ref 135–145)
WBC # BLD: 7.32 K/UL — SIGNIFICANT CHANGE UP (ref 3.8–10.5)
WBC # BLD: 8.05 K/UL — SIGNIFICANT CHANGE UP (ref 3.8–10.5)
WBC # FLD AUTO: 7.32 K/UL — SIGNIFICANT CHANGE UP (ref 3.8–10.5)
WBC # FLD AUTO: 8.05 K/UL — SIGNIFICANT CHANGE UP (ref 3.8–10.5)

## 2020-07-09 PROCEDURE — 99291 CRITICAL CARE FIRST HOUR: CPT

## 2020-07-09 PROCEDURE — 99233 SBSQ HOSP IP/OBS HIGH 50: CPT | Mod: GC

## 2020-07-09 RX ADMIN — CEFTRIAXONE 100 MILLIGRAM(S): 500 INJECTION, POWDER, FOR SOLUTION INTRAMUSCULAR; INTRAVENOUS at 10:00

## 2020-07-09 RX ADMIN — BUMETANIDE 2 MILLIGRAM(S): 0.25 INJECTION INTRAMUSCULAR; INTRAVENOUS at 14:48

## 2020-07-09 RX ADMIN — Medication 20 MILLIGRAM(S): at 18:11

## 2020-07-09 RX ADMIN — BUMETANIDE 2 MILLIGRAM(S): 0.25 INJECTION INTRAMUSCULAR; INTRAVENOUS at 06:10

## 2020-07-09 RX ADMIN — Medication 20 MILLIGRAM(S): at 12:37

## 2020-07-09 RX ADMIN — BUMETANIDE 2 MILLIGRAM(S): 0.25 INJECTION INTRAMUSCULAR; INTRAVENOUS at 21:50

## 2020-07-09 NOTE — PROGRESS NOTE ADULT - ASSESSMENT
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  Sent from clinic for IV diuresis and milrinone therapy.      #Neuro   - No acute issues    #Cardiovascular  Acute Decompensated Heart Failure   - continue diuresis with Bumex 2mg q8h and home aldactone; goal at least 1L net negative per day   - continue milrinone gtt   - Continue home digoxin   - HOLD home lisinopril per Congenital Heart team   - [facility] arrangements per Peds Congenital Heart team   - Per Peds Cards, alpha 1 antitrypsin phenotype in AM, start sildenafil 20mg q12 and monitor BP closely    #Respiratory   - Patient baseline sat 88%   - Currently satting 89-95% on room air    #Gastrointestinal  Spontaneous bacterial peritonitis   - Peritoneal fluid analysis demonstrates PMNs 1211, gram stain with PMNs no organism seen.  Diagnostic for SBP.  Patient remains asymptomatic, no fevers, no WBC elevation, no abdominal pain   - Ceftriaxone 2g q24, 5 day course ending 7/12   - Per Hepatology, repeat diagnostic paracentesis on 7/10 to evaluate response.  Would defer procedure if no safe pocket of fluid to sample.  Ascites   - Likely secondary to heart failure vs cirrhosis   - SAAG 0.7, glucose 113, , protein 3.6 all consistent with ascites from heart failure  Gastritis   - Continue home protonix   - History of GI bleed   - Patient notes dark stools since restarting iron, Hgb remains stable    #Renal   - Continue diuresis with Bumex gtt and aldactone   - Baseline Cr appears to be ~0.8    #Infectious Disease  Spontaneous Bacterial Peritonitis   - as above, ceftriaxone 2g q24 ending 7/12   - Repeat diagnostic paracentesis 7/10    #Endo   - No acute issues    #Heme  Anemia   - Has baseline anemia usually ~13.0   - Current hgb 9-11 this hospitalization; all cell lines down including plts and wbc   - Continue to get CBC q12 to monitor, and have active type and screen   - Anemia labs indicate no hemolysis, adequate folate and B12, and slight iron deficiency.  Will continue iron supplementation

## 2020-07-09 NOTE — PROGRESS NOTE ADULT - ASSESSMENT
Impression:  30 yo M w/ PMHx hypoplastic L heart s/p Fontan procedure, GIB (1/2020) presenting w/ acute on chronic ADHF and worsening ascites, hepatology consulted for ascites management, pt now s/p LVP w/ SBP.     Etiology of patient's worsening ascites likely from congestive hepatopathy and subsequent cirrhosis. However difficult to determine whether ascites is entirely attributed to heart failure and congestive hepatopathy, or whether prolonged liver damage and cirrhosis have led to independent liver fibrosis/cirrhosis. Understanding this can help guide Mr. Acevedo's candidacy for heart transplant. To evaluate his liver fibrosis and/or portal pressures, we can recommend three potential options. First would be assessment of portal pressures, which can be done during a cardiac catheterization, by measuring wedged hepatic venous pressures. This can be coordinated either by interventional cardiology or interventional radiology at time of cardiac catheterization. Second, to assess level of fibrosis, as an outpatient Mr. Acevedo can undergo MR elastography (not done as inpatient). He either way warrants liver imaging to assess for mass lesion, and therefore can undergo MRI liver/ MR elastography as outpatient. Third, and most invasive, would be a liver biopsy to assess level of fibrosis.     Additionally, patient's ascites fluid was positive for SBP by cell count. It is not uncommon for hospitalized patients with ascites to have asymptomatic SBP, and it may reflect transient bacterial translocation across bowel mucosa. His prior GIB (2/2020) which demonstrated duodenitis and was treated with APC likely does not explain the current SBP. He may have developed new bowel inflammation (colitis or duodenetis) to explain the SBP, or there may be no clear precipitant for the SBP. Nevertheless, we will recommend further imaging to evaluate the bowel walls, as well as recommend treatment for SBP. Timing of further cardiac intervention as it related to SBP treatment to be coordinated by cardiology.     Recommendations:  - treatment for SBP: c/w CTX daily, can defer albumin infusion as patient with poor cardiac function and systemically volume overload  - if tapable pocket, would recommend repeat paracentesis on 7/10 to assess improvement in cell count and response to abx therapy  - f/u serology labs:  HAV, HBVsAb, HBVsAg, HCV Ab, ceruloplasm, alpha-1 antitrypsin (phenotype), AFP, iron sat/ferritin/TIBC, immunoglobulin panel (IgG, IgA, IgM), MICHELLE, anti-smooth muscle, anti-mitochondrial, Anti-liver kidney microsomal antibody  - evaluate of fibrosis as described above  - evaluate portal pressures as described above  - timing of cardiac cath as per cardiology, likely wait until SBP treated  - diuretics management per MICU/cardiology    Thank you for this interesting consult. Hepatology will continue to follow.     Sylvester Mccray, PGY4  Gastroenterology Fellow  Available on [employer] Teams  [phone] (Long Range Pager)    After 5pm, please contact the on-call GI fellow. [phone] Impression:  30 yo M w/ PMHx hypoplastic L heart s/p Fontan procedure, GIB (1/2020) presenting w/ acute on chronic ADHF and worsening ascites, hepatology consulted for ascites management, pt now s/p LVP w/ SBP.     Etiology of patient's worsening ascites likely from congestive hepatopathy and subsequent cirrhosis. However difficult to determine whether ascites is entirely attributed to heart failure and congestive hepatopathy, or whether prolonged liver damage and cirrhosis have led to independent liver fibrosis/cirrhosis. Understanding this can help guide Mr. Acevedo's candidacy for heart transplant. To evaluate his liver fibrosis and/or portal pressures, we can recommend three potential options. First would be assessment of portal pressures by measuring wedged hepatic venous pressures. Second, to assess level of fibrosis, as an outpatient Mr. Acevedo can undergo MR elastography (not done as inpatient). He either way warrants liver imaging to assess for mass lesion, and therefore can undergo MRI liver/ MR elastography as outpatient. Third, and most invasive, would be a liver biopsy to assess level of fibrosis.     Additionally, patient's ascites fluid was positive for SBP by cell count. It is not uncommon for hospitalized patients with ascites to have asymptomatic SBP, and it may reflect transient bacterial translocation across bowel mucosa. His prior GIB (2/2020) which demonstrated duodenitis and was treated with APC likely does not explain the current SBP. He may have developed new bowel inflammation (colitis or duodenetis) to explain the SBP, or there may be no clear precipitant for the SBP. Nevertheless, we will recommend further imaging to evaluate the bowel walls, as well as recommend treatment for SBP. Timing of further cardiac intervention as it related to SBP treatment to be coordinated by cardiology.     Recommendations:  - treatment for SBP: c/w CTX daily, can defer albumin infusion as patient with poor cardiac function and systemically volume overload  - if tapable pocket, would recommend repeat paracentesis on 7/10 to assess improvement in cell count and response to abx therapy  - f/u serology labs:  HAV, HBVsAb, HBVsAg, HCV Ab, ceruloplasm, alpha-1 antitrypsin (phenotype), AFP, iron sat/ferritin/TIBC, immunoglobulin panel (IgG, IgA, IgM), MICHELLE, anti-smooth muscle, anti-mitochondrial, Anti-liver kidney microsomal antibody  - evaluate of fibrosis as described above  - evaluate portal pressures as described above  - timing of cardiac cath as per cardiology, likely wait until SBP treated  - diuretics management per MICU/cardiology    Thank you for this interesting consult. Hepatology will continue to follow.     Sylvester Mccray, PGY4  Gastroenterology Fellow  Available on [employer] Teams  [phone] (Long Range Pager)    After 5pm, please contact the on-call GI fellow. [phone]

## 2020-07-09 NOTE — PROGRESS NOTE ADULT - SUBJECTIVE AND OBJECTIVE BOX
Chief Complaint:  Patient is a 29y old  Male who presents with a chief complaint of Acute decompensated heart failure (09 Jul 2020 09:04)      Interval Events: Yesterday patient underwent scrotal US, notable for hydrocele. Patient also underwent CT A/P for possible source of SBP, read pending. AM labs notable for Hg 11.4 -> 9.7 (has been fluctuating 9-11 during hospitalization), otherwise labs wnl. Patient reports continues to have dark stools, per overnight RN was dark formed, not melanic.     Allergies:  No Known Allergies      Hospital Medications:  buMETAnide Injectable 2 milliGRAM(s) IV Push every 8 hours  cefTRIAXone   IVPB      cefTRIAXone   IVPB 2000 milliGRAM(s) IV Intermittent every 24 hours  chlorhexidine 4% Liquid 1 Application(s) Topical <User Schedule>  digoxin     Tablet 0.125 milliGRAM(s) Oral daily  ferrous    sulfate 325 milliGRAM(s) Oral daily  heparin   Injectable 5000 Unit(s) SubCutaneous every 8 hours  melatonin 3 milliGRAM(s) Oral at bedtime  milrinone Infusion 0.125 MICROgram(s)/kG/Min IV Continuous <Continuous>  sildenafil (REVATIO) 20 milliGRAM(s) Oral every 12 hours  spironolactone 25 milliGRAM(s) Oral daily        PHYSICAL EXAM:   Vital Signs:  Vital Signs Last 24 Hrs  T(C): 36.8 (09 Jul 2020 00:00), Max: 36.8 (08 Jul 2020 20:00)  T(F): 98.2 (09 Jul 2020 00:00), Max: 98.2 (08 Jul 2020 20:00)  HR: 85 (09 Jul 2020 06:10) (74 - 95)  BP: 110/63 (09 Jul 2020 06:10) (97/71 - 126/74)  BP(mean): 75 (09 Jul 2020 06:10) (75 - 89)  RR: 18 (09 Jul 2020 06:10) (16 - 27)  SpO2: 93% (09 Jul 2020 06:10) (89% - 98%)  Daily     Daily     GENERAL:  No acute distress  HEENT:  Normocephalic/atraumtic,  no scleral icterus  CHEST:  Clear to auscultation bilaterally, no wheezes/rales/ronchi, no accessory muscle use  HEART:  Regular rate and rhythm, no murmurs/rubs/gallops  ABDOMEN:  Soft, non-tender, distended  : R scrotal swelling, nontender, reducible into abdomen, L groin inguinal hernia  EXTREMITIES:  No cyanosis, clubbing, or edema  SKIN:  No rash/erythema/ecchymoses/petechiae/wounds/abscess/warm/dry  NEURO:  Alert and oriented x 3, no asterixis, no tremor      LABS:  CBC: 7.32>9.7 (from 11.4, has been fluctuating 9-11)<262  BMP: 128/3.9/92/27/22/0.7/110  LFT: 11/9 | 120 | 0.2 | 5/2.6    Ascites fluid:  Cell count: 13K RBC, 1522 WBC, 83%N    Ferritin: 254  TIBC: 205  IgG 797  IgA 113  IgM 3.4    Alpha-1 AT: 270    HAV: IgG positive  HBV: immune  HCV: neg        Imaging: Chief Complaint:  Patient is a 29y old  Male who presents with a chief complaint of Acute decompensated heart failure (09 Jul 2020 09:04)      Interval Events: Yesterday patient underwent scrotal US, notable for hydrocele. Patient also underwent CT A/P for possible source of SBP, read pending. AM labs notable for Hg 11.4 -> 9.7 (has been fluctuating 9-11 during hospitalization), otherwise labs wnl. Patient reports continues to have dark stools, per overnight RN was dark formed, not melanic.     Allergies:  No Known Allergies      Hospital Medications:  buMETAnide Injectable 2 milliGRAM(s) IV Push every 8 hours  cefTRIAXone   IVPB      cefTRIAXone   IVPB 2000 milliGRAM(s) IV Intermittent every 24 hours  chlorhexidine 4% Liquid 1 Application(s) Topical <User Schedule>  digoxin     Tablet 0.125 milliGRAM(s) Oral daily  ferrous    sulfate 325 milliGRAM(s) Oral daily  heparin   Injectable 5000 Unit(s) SubCutaneous every 8 hours  melatonin 3 milliGRAM(s) Oral at bedtime  milrinone Infusion 0.125 MICROgram(s)/kG/Min IV Continuous <Continuous>  sildenafil (REVATIO) 20 milliGRAM(s) Oral every 12 hours  spironolactone 25 milliGRAM(s) Oral daily        PHYSICAL EXAM:   Vital Signs:  Vital Signs Last 24 Hrs  T(C): 36.8 (09 Jul 2020 00:00), Max: 36.8 (08 Jul 2020 20:00)  T(F): 98.2 (09 Jul 2020 00:00), Max: 98.2 (08 Jul 2020 20:00)  HR: 85 (09 Jul 2020 06:10) (74 - 95)  BP: 110/63 (09 Jul 2020 06:10) (97/71 - 126/74)  BP(mean): 75 (09 Jul 2020 06:10) (75 - 89)  RR: 18 (09 Jul 2020 06:10) (16 - 27)  SpO2: 93% (09 Jul 2020 06:10) (89% - 98%)  Daily     Daily     GENERAL:  No acute distress  HEENT:  Normocephalic/atraumtic,  no scleral icterus  CHEST:  Clear to auscultation bilaterally, no wheezes/rales/ronchi, no accessory muscle use  HEART:  Regular rate and rhythm, no murmurs/rubs/gallops  ABDOMEN:  Soft, non-tender, distended. RLQ para site c/d/i  : R scrotal swelling, nontender, reducible into abdomen, L groin inguinal hernia  EXTREMITIES:  No cyanosis, clubbing, or edema  SKIN:  No rash/erythema/ecchymoses/petechiae/wounds/abscess/warm/dry  NEURO:  Alert and oriented x 3, no asterixis, no tremor      LABS:  CBC: 7.32>9.7 (from 11.4, has been fluctuating 9-11)<262  BMP: 128/3.9/92/27/22/0.7/110  LFT: 11/9 | 120 | 0.2 | 5/2.6    Ascites fluid:  Cell count: 13K RBC, 1522 WBC, 83%N    Ferritin: 254  TIBC: 205  IgG 797  IgA 113  IgM 3.4    Alpha-1 AT: 270    HAV: IgG positive  HBV: immune  HCV: neg        Imaging:  CT A/P (7/8/20):  Small to moderate volume ascites with mild peritoneal enhancement, compatible with known peritonitis. No free air.  Large left pleural effusion.

## 2020-07-09 NOTE — PROGRESS NOTE ADULT - ATTENDING COMMENTS
Agree with above. Seen and examined with team on rounds. Continue diuresis with milrinone and bumex. Start Sildenafil today BID. On antibiotics for SBP. Supportive care.

## 2020-07-09 NOTE — CONSULT NOTE ADULT - ASSESSMENT
This is a 28 yo M w/ PMHx hypoplastic L heart s/p Fontan procedure, admitted for decompensated heart failure 2/2 fontan failure c/b worsening ascites, now with 8.7cm right hydrocele & Left varicoceles.  Patient is currently being optimized for cardiac cath and is being treated for heart failure.  Increased abdominal fluid and pressure likely the etiology behind the hydrocele development, and it is likely to improve after these issues are addressed.     Recommendation:  - No acute surgical intervention,   - Heart issues take precedence, and will likely improve hydrocele  - Patient can follow with Dr. Grant as an outpatient.    To be discussed with attending    Urology  [id number] This is a 28 yo M w/ PMHx hypoplastic L heart s/p Fontan procedure, admitted for decompensated heart failure 2/2 fontan failure c/b worsening ascites, now with 8.7cm right hydrocele & Left varicoceles.  Patient is currently being optimized for cardiac cath and is being treated for heart failure.  Increased abdominal fluid and pressure likely the etiology behind the hydrocele development, and it is likely to improve after these issues are addressed.     Recommendation:  - Aspiration is an option, but hydrocele will likely return 2/2 ascites  - No acute surgical intervention,   - Heart issues take precedence, and will likely improve hydrocele  - Patient can follow with Dr. Crowder as an outpatient. [phone]    Urology  [id number] This is a 28 yo M w/ PMHx hypoplastic L heart s/p Fontan procedure, admitted for decompensated heart failure 2/2 fontan failure c/b worsening ascites, now with 8.7cm right hydrocele & Left varicoceles.  Patient is currently being optimized for cardiac cath and is being treated for heart failure.  Increased abdominal fluid and pressure likely the etiology behind the hydrocele development, and it is likely to improve after these issues are addressed.     Recommendation:  - Aspiration is an option, but hydrocele will likely return relatively quickly 2/2 ascites  - No acute surgical intervention,   - Heart issues take precedence, and will likely improve hydrocele  - Patient can follow with Dr. Crowder as an outpatient. [phone]    Urology  [id number]

## 2020-07-09 NOTE — PROGRESS NOTE ADULT - SUBJECTIVE AND OBJECTIVE BOX
CHIEF COMPLAINT: Patient is a 29y old  Male who presents with a chief complaint of Acute decompensated heart failure (09 Jul 2020 09:43)    Interval Events: Overnight, good urine output.  CT scan obtained.  No SOB while reclining although some reflux which is not a new issue for him.  Feels well, no complaints.      REVIEW OF SYSTEMS:  In additional the that documented in the HPI, the additional ROS was obtained:    CONSTITUTIONAL: No fever, no chills  EYES: no change in vision, no blurred vision  HEENT: no trouble swallowing, no trouble speaking, no sore throat  NECK: no pain, no stiffness  CV: no chest pain, no palpitations  RESP: no cough, no shortness of breath  GI: +reflux while lying flat.  no abdominal pain, no nausea, no vomiting, no diarrhea, no constipation, no BRBPR or melena  : No dysuria, no frequency  NEURO: no headache, no new numbness, no weakness, no dizziness  SKIN: no new rashes  HEME: No easy bruising or bleeding  MSK: No joint pain, no recent trauma  Tariq Abernathy M.D. -Resident     OBJECTIVE:  ICU Vital Signs Last 24 Hrs  T(C): 36.8 (09 Jul 2020 00:00), Max: 36.8 (08 Jul 2020 20:00)  T(F): 98.2 (09 Jul 2020 00:00), Max: 98.2 (08 Jul 2020 20:00)  HR: 85 (09 Jul 2020 06:10) (74 - 95)  BP: 110/63 (09 Jul 2020 06:10) (97/71 - 126/74)  BP(mean): 75 (09 Jul 2020 06:10) (75 - 89)  ABP: --  ABP(mean): --  RR: 18 (09 Jul 2020 06:10) (16 - 27)  SpO2: 93% (09 Jul 2020 06:10) (89% - 98%)        07-08 @ 07:01  -  07-09 @ 07:00  --------------------------------------------------------  IN: 302 mL / OUT: 1625 mL / NET: -1323 mL      CAPILLARY BLOOD GLUCOSE    PHYSICAL EXAM:  Vital signs reviewed.  CONSTITUTIONAL: NAD, awake, interactive, sitting in chair eating breakfast  HEAD: Normocephalic; atraumatic  EYES: EOMI, no nystagmus, no conjunctival injection, no scleral icterus  MOUTH/THROAT:  MMM  NECK: Trachea midline, no JVD  CV: Normal S1, S2; no audible murmurs; extremities WWP  RESP: normal work of breathing; CTAB, no stridor  ABD: soft, distended but smaller than yesterday, nontender  : deferred  MSK/EXT: no edema, +prominent vasculature on legs b/l.  normal ROM in all four extremities, no deformities  SKIN: No gross rashes or lesions on exposed skin, no significant trophic changes  NEURO: aaox3, moving all extremities purposefully and spontaneously     HOSPITAL MEDICATIONS:  MEDICATIONS  (STANDING):  buMETAnide Injectable 2 milliGRAM(s) IV Push every 8 hours  cefTRIAXone   IVPB      cefTRIAXone   IVPB 2000 milliGRAM(s) IV Intermittent every 24 hours  chlorhexidine 4% Liquid 1 Application(s) Topical <User Schedule>  digoxin     Tablet 0.125 milliGRAM(s) Oral daily  ferrous    sulfate 325 milliGRAM(s) Oral daily  heparin   Injectable 5000 Unit(s) SubCutaneous every 8 hours  melatonin 3 milliGRAM(s) Oral at bedtime  milrinone Infusion 0.125 MICROgram(s)/kG/Min (2.6 mL/Hr) IV Continuous <Continuous>  sildenafil (REVATIO) 20 milliGRAM(s) Oral every 12 hours  spironolactone 25 milliGRAM(s) Oral daily    MEDICATIONS  (PRN):      LABS:  (07-09 @ 06:22)                        9.7  7.32 )-----------( 262                 29.2    Neutrophils = 5.99 (81.7%)  Lymphocytes = 0.48 (6.6%)  Eosinophils = 0.07 (1.0%)  Basophils = 0.05 (0.7%)  Monocytes = 0.70 (9.6%)  Bands = --%    WBC Trend: 7.32<--, 8.29<--, 6.92<--  Hb Trend: 9.7<--, 11.4<--, 11.2<--, 9.9<--, 11.2<--  Plt Trend: 262<--, 317<--, 270<--, 258<--, 319<--  07-09    128<L>  |  92<L>  |  22  ----------------------------<  110<H>  3.9   |  27  |  0.70    Ca    8.5      09 Jul 2020 06:22  Phos  4.5     07-09  Mg     2.0     07-09    TPro  5.0<L>  /  Alb  2.6<L>  /  TBili  0.2  /  DBili  x   /  AST  11  /  ALT  9   /  AlkPhos  120  07-09    Creatinine Trend: 0.70<--, 0.72<--, 0.66<--, 0.69<--, 0.65<--, 0.65<--    MICROBIOLOGY:   Blood Cx: ngtd  Peritoneal Cx: PMNs on gram stain, no growth to date    RADIOLOGY:   CT: small ascites, no obvious source of SBP

## 2020-07-09 NOTE — PROGRESS NOTE ADULT - ATTENDING COMMENTS
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  Has recent onset ascites and paracentesis documents spontaneous bacterial peritonitis.  Now on antibiotics. Suggest paracentesis on antibiotics 48 hours if adequate ascites present for follow up paracentesis.  No alternative cause for liver disease evident to date.  Abdominal CT did not show any inflammatory focus as nidus for ascites infection.      If patient is to be considered for heart transplant, definitive evaluation of liver would be best clarified with transjugular liver biopsy and wedged hepatic venous pressures.

## 2020-07-09 NOTE — CONSULT NOTE ADULT - SUBJECTIVE AND OBJECTIVE BOX
HPI  This is a 28 yo M w/ PMHx hypoplastic L heart s/p Fontan procedure, GIB, admitted for decompensated heart failure and worsening ascites, also with UGI angioectasias requiring transfusion.  Urology is consulted for right hydrocele. pt is s/p Fontan procedure and has ascites treated w/ bumex.     PAST MEDICAL & SURGICAL HISTORY:  Anxiety  Melena  Anemia  HLHS (hypoplastic left heart syndrome)  H/O cardiac catheterization: device closure of fenestration  Status post bidirectional Justice operation  S/P Renea operation  S/P Fontan procedure      MEDICATIONS  (STANDING):  buMETAnide Injectable 2 milliGRAM(s) IV Push every 8 hours  cefTRIAXone   IVPB      cefTRIAXone   IVPB 2000 milliGRAM(s) IV Intermittent every 24 hours  chlorhexidine 4% Liquid 1 Application(s) Topical <User Schedule>  digoxin     Tablet 0.125 milliGRAM(s) Oral daily  ferrous    sulfate 325 milliGRAM(s) Oral daily  heparin   Injectable 5000 Unit(s) SubCutaneous every 8 hours  melatonin 3 milliGRAM(s) Oral at bedtime  milrinone Infusion 0.125 MICROgram(s)/kG/Min (2.6 mL/Hr) IV Continuous <Continuous>  sildenafil (REVATIO) 20 milliGRAM(s) Oral every 12 hours  spironolactone 25 milliGRAM(s) Oral daily    MEDICATIONS  (PRN):      FAMILY HISTORY:  Family history of neoplasm of uncertain behavior of connective and other soft tissue: mother      Allergies    No Known Allergies    Intolerances        SOCIAL HISTORY:    REVIEW OF SYSTEMS: Otherwise negative as stated in HPI    Physical Exam  Vital signs  T(C): 37.2 (07-09-20 @ 12:00), Max: 37.2 (07-09-20 @ 09:00)  HR: 80 (07-09-20 @ 12:00)  BP: 118/86 (07-09-20 @ 12:00)  SpO2: 90% (07-09-20 @ 12:00)  Wt(kg): --    Output    OUT:    Voided: 1625 mL  Total OUT: 1625 mL    Total NET: -1625 mL      OUT:    Voided: 1125 mL  Total OUT: 1125 mL    Total NET: -1125 mL          Gen:  NAD    Pulm:  No respiratory distress  	  CV:  RRR    GI:  S/ND/NT    :      MSK:      LABS:      07-09 @ 06:22    WBC 7.32  / Hct 29.2  / SCr 0.70     07-08 @ 21:20    WBC 8.29  / Hct 35.3  / SCr 0.72     07-09    128<L>  |  92<L>  |  22  ----------------------------<  110<H>  3.9   |  27  |  0.70    Ca    8.5      09 Jul 2020 06:22  Phos  4.5     07-09  Mg     2.0     07-09    TPro  5.0<L>  /  Alb  2.6<L>  /  TBili  0.2  /  DBili  x   /  AST  11  /  ALT  9   /  AlkPhos  120  07-09          Urine Cx:  Blood Cx:    RADIOLOGY: HPI  This is a 30 yo M w/ PMHx hypoplastic L heart s/p Fontan procedure, admitted for decompensated heart failure and worsening ascites, also with UGI angiectasias requiring transfusion, now with large right hydrocele. Pt states his failing Fontan has led to significant heart failure c/b worsening ascites. this has led to development of a R hydrocele, which he first noticed about a month ago.  The past few weeks the hydrocele has grown significantly and he is scheduled to follow with Dr. Hanks on Tuesday, but because of his optimization for cardiac cath he wished to establish care while an inpatient.  Pt also states he has discomfort on left side intermittently, and has noticed swelling of the veins near his teste.  Pt is s/p paracentesis and noticed improvement of the hydrocele at this time.  Pt denies right testicular pain, nausea, vomiting, fevers chills, dysuria, hematuria.     PAST MEDICAL & SURGICAL HISTORY:  Anxiety  Melena  Anemia  HLHS (hypoplastic left heart syndrome)  H/O cardiac catheterization: device closure of fenestration  Status post bidirectional Justice operation  S/P Renea operation  S/P Fontan procedure      MEDICATIONS  (STANDING):  buMETAnide Injectable 2 milliGRAM(s) IV Push every 8 hours  cefTRIAXone   IVPB      cefTRIAXone   IVPB 2000 milliGRAM(s) IV Intermittent every 24 hours  chlorhexidine 4% Liquid 1 Application(s) Topical <User Schedule>  digoxin     Tablet 0.125 milliGRAM(s) Oral daily  ferrous    sulfate 325 milliGRAM(s) Oral daily  heparin   Injectable 5000 Unit(s) SubCutaneous every 8 hours  melatonin 3 milliGRAM(s) Oral at bedtime  milrinone Infusion 0.125 MICROgram(s)/kG/Min (2.6 mL/Hr) IV Continuous <Continuous>  sildenafil (REVATIO) 20 milliGRAM(s) Oral every 12 hours  spironolactone 25 milliGRAM(s) Oral daily    MEDICATIONS  (PRN):      FAMILY HISTORY:  Family history of neoplasm of uncertain behavior of connective and other soft tissue: mother      Allergies    No Known Allergies    Intolerances        SOCIAL HISTORY:    REVIEW OF SYSTEMS: Otherwise negative as stated in HPI    Physical Exam  Vital signs  T(C): 37.2 (07-09-20 @ 12:00), Max: 37.2 (07-09-20 @ 09:00)  HR: 80 (07-09-20 @ 12:00)  BP: 118/86 (07-09-20 @ 12:00)  SpO2: 90% (07-09-20 @ 12:00)  Wt(kg): --    Output    OUT:    Voided: 1625 mL  Total OUT: 1625 mL    Total NET: -1625 mL      OUT:    Voided: 1125 mL  Total OUT: 1125 mL    Total NET: -1125 mL          Gen:  NAD    Pulm:  No respiratory distress  	  CV:  RRR    GI:  S/nontender/distended suprapubic,     : circumcised penis, no lesions or discharges.  Left hemiscrotum testicle soft, nontender, normal lie no masses varicocele palpated, right hemiscrotom enlarged, firm fluid filled nontender.       MSK: full str and ROM      LABS:      07-09 @ 06:22    WBC 7.32  / Hct 29.2  / SCr 0.70     07-08 @ 21:20    WBC 8.29  / Hct 35.3  / SCr 0.72     07-09    128<L>  |  92<L>  |  22  ----------------------------<  110<H>  3.9   |  27  |  0.70    Ca    8.5      09 Jul 2020 06:22  Phos  4.5     07-09  Mg     2.0     07-09    TPro  5.0<L>  /  Alb  2.6<L>  /  TBili  0.2  /  DBili  x   /  AST  11  /  ALT  9   /  AlkPhos  120  07-09          Culture - Body Fluid with Gram Stain (07.07.20 @ 18:20)    Gram Stain:   polymorphonuclear leukocytes seen  No organisms seen  by cytocentrifuge    Specimen Source: .Body Fluid Peritoneal Fluid    Culture Results:   No growth        RADIOLOGY:  < from: US Testicles (07.08.20 @ 15:06) >    EXAM:  US SCROTUM AND CONTENTS        PROCEDURE DATE:  Jul 8 2020         INTERPRETATION:  CLINICAL INFORMATION: Right-sided scrotal edema. Heart failure. Ascites.    COMPARISON: None available.    TECHNIQUE: Testicular ultrasound utilizing color and spectral Doppler.    FINDINGS:    RIGHT:  Right testis: 3.4 x 1.8 x 2.5 cm. Normal echogenicity and echotexture with no masses or areas of architectural distortion. Normal arterial and venous blood flow pattern.  Right epididymis: Within normal limits.  Right hydrocele: Large right hydrocele, measuring 8.7 x 7.7 x 7.9 cm.  Right varicocele: Small right varicocele.    LEFT:   Left testis: 3.5 x 1.9 x 2.5 cm. Normal echogenicity and echotexture with no masses or areas of architectural distortion. Normal arterial and venous blood flow pattern.  Left epididymis: Within normal limits.  Left hydrocele: None.  Left varicocele: Left-sided varicocele.    IMPRESSION:     Large right hydrocele.      < end of copied text > HPI  This is a 30 yo M w/ PMHx hypoplastic L heart s/p Fontan procedure, admitted for decompensated heart failure and worsening ascites, also with UGI angiectasias requiring transfusion, now with large right hydrocele. Pt states his failing Fontan has led to significant heart failure c/b worsening ascites. this has led to development of a R hydrocele, which he first noticed about a month ago.  The past few weeks the hydrocele has grown significantly and he is scheduled to follow with Dr. Hanks on Tuesday, but because of his optimization for cardiac cath he wished to establish care while an inpatient.  Pt also states he has discomfort on left side intermittently, and has noticed swelling of the veins near his teste.  Pt is s/p paracentesis and noticed improvement of the hydrocele at this time.  Pt denies right testicular pain, nausea, vomiting, fevers chills, dysuria, hematuria.     PAST MEDICAL & SURGICAL HISTORY:  Anxiety  Melena  Anemia  HLHS (hypoplastic left heart syndrome)  H/O cardiac catheterization: device closure of fenestration  Status post bidirectional Justice operation  S/P Renea operation  S/P Fontan procedure      MEDICATIONS  (STANDING):  buMETAnide Injectable 2 milliGRAM(s) IV Push every 8 hours  cefTRIAXone   IVPB      cefTRIAXone   IVPB 2000 milliGRAM(s) IV Intermittent every 24 hours  chlorhexidine 4% Liquid 1 Application(s) Topical <User Schedule>  digoxin     Tablet 0.125 milliGRAM(s) Oral daily  ferrous    sulfate 325 milliGRAM(s) Oral daily  heparin   Injectable 5000 Unit(s) SubCutaneous every 8 hours  melatonin 3 milliGRAM(s) Oral at bedtime  milrinone Infusion 0.125 MICROgram(s)/kG/Min (2.6 mL/Hr) IV Continuous <Continuous>  sildenafil (REVATIO) 20 milliGRAM(s) Oral every 12 hours  spironolactone 25 milliGRAM(s) Oral daily    MEDICATIONS  (PRN):      FAMILY HISTORY:  Family history of neoplasm of uncertain behavior of connective and other soft tissue: mother      Allergies    No Known Allergies    Intolerances        SOCIAL HISTORY:    REVIEW OF SYSTEMS: Otherwise negative as stated in HPI    Physical Exam  Vital signs  T(C): 37.2 (07-09-20 @ 12:00), Max: 37.2 (07-09-20 @ 09:00)  HR: 80 (07-09-20 @ 12:00)  BP: 118/86 (07-09-20 @ 12:00)  SpO2: 90% (07-09-20 @ 12:00)  Wt(kg): --    Output    OUT:    Voided: 1625 mL  Total OUT: 1625 mL    Total NET: -1625 mL      OUT:    Voided: 1125 mL  Total OUT: 1125 mL    Total NET: -1125 mL          Gen:  NAD    Pulm:  No respiratory distress  	  CV:  RRR    GI:  S/nontender/distended suprapubic,     : circumcised penis, no lesions or discharges.  Left hemiscrotum testicle soft, nontender, normal lie no masses, + varicocele palpated, right hemiscrotom enlarged, firm fluid filled nontender.       MSK: full str and ROM      LABS:      07-09 @ 06:22    WBC 7.32  / Hct 29.2  / SCr 0.70     07-08 @ 21:20    WBC 8.29  / Hct 35.3  / SCr 0.72     07-09    128<L>  |  92<L>  |  22  ----------------------------<  110<H>  3.9   |  27  |  0.70    Ca    8.5      09 Jul 2020 06:22  Phos  4.5     07-09  Mg     2.0     07-09    TPro  5.0<L>  /  Alb  2.6<L>  /  TBili  0.2  /  DBili  x   /  AST  11  /  ALT  9   /  AlkPhos  120  07-09          Culture - Body Fluid with Gram Stain (07.07.20 @ 18:20)    Gram Stain:   polymorphonuclear leukocytes seen  No organisms seen  by cytocentrifuge    Specimen Source: .Body Fluid Peritoneal Fluid    Culture Results:   No growth        RADIOLOGY:  < from: US Testicles (07.08.20 @ 15:06) >    EXAM:  US SCROTUM AND CONTENTS        PROCEDURE DATE:  Jul 8 2020         INTERPRETATION:  CLINICAL INFORMATION: Right-sided scrotal edema. Heart failure. Ascites.    COMPARISON: None available.    TECHNIQUE: Testicular ultrasound utilizing color and spectral Doppler.    FINDINGS:    RIGHT:  Right testis: 3.4 x 1.8 x 2.5 cm. Normal echogenicity and echotexture with no masses or areas of architectural distortion. Normal arterial and venous blood flow pattern.  Right epididymis: Within normal limits.  Right hydrocele: Large right hydrocele, measuring 8.7 x 7.7 x 7.9 cm.  Right varicocele: Small right varicocele.    LEFT:   Left testis: 3.5 x 1.9 x 2.5 cm. Normal echogenicity and echotexture with no masses or areas of architectural distortion. Normal arterial and venous blood flow pattern.  Left epididymis: Within normal limits.  Left hydrocele: None.  Left varicocele: Left-sided varicocele.    IMPRESSION:     Large right hydrocele.      < end of copied text >

## 2020-07-10 LAB
ALBUMIN SERPL ELPH-MCNC: 2.9 G/DL — LOW (ref 3.3–5)
ALBUMIN SERPL ELPH-MCNC: 2.9 G/DL — LOW (ref 3.3–5)
ALP SERPL-CCNC: 139 U/L — HIGH (ref 40–120)
ALP SERPL-CCNC: 155 U/L — HIGH (ref 40–120)
ALT FLD-CCNC: 12 U/L — SIGNIFICANT CHANGE UP (ref 4–41)
ALT FLD-CCNC: 12 U/L — SIGNIFICANT CHANGE UP (ref 4–41)
ANION GAP SERPL CALC-SCNC: 10 MMO/L — SIGNIFICANT CHANGE UP (ref 7–14)
ANION GAP SERPL CALC-SCNC: 9 MMO/L — SIGNIFICANT CHANGE UP (ref 7–14)
AST SERPL-CCNC: 12 U/L — SIGNIFICANT CHANGE UP (ref 4–40)
AST SERPL-CCNC: 13 U/L — SIGNIFICANT CHANGE UP (ref 4–40)
BASOPHILS # BLD AUTO: 0.05 K/UL — SIGNIFICANT CHANGE UP (ref 0–0.2)
BASOPHILS # BLD AUTO: 0.06 K/UL — SIGNIFICANT CHANGE UP (ref 0–0.2)
BASOPHILS NFR BLD AUTO: 0.7 % — SIGNIFICANT CHANGE UP (ref 0–2)
BASOPHILS NFR BLD AUTO: 0.7 % — SIGNIFICANT CHANGE UP (ref 0–2)
BILIRUB SERPL-MCNC: 0.2 MG/DL — SIGNIFICANT CHANGE UP (ref 0.2–1.2)
BILIRUB SERPL-MCNC: < 0.2 MG/DL — LOW (ref 0.2–1.2)
BUN SERPL-MCNC: 23 MG/DL — SIGNIFICANT CHANGE UP (ref 7–23)
BUN SERPL-MCNC: 23 MG/DL — SIGNIFICANT CHANGE UP (ref 7–23)
CALCIUM SERPL-MCNC: 8.3 MG/DL — LOW (ref 8.4–10.5)
CALCIUM SERPL-MCNC: 8.6 MG/DL — SIGNIFICANT CHANGE UP (ref 8.4–10.5)
CHLORIDE SERPL-SCNC: 92 MMOL/L — LOW (ref 98–107)
CHLORIDE SERPL-SCNC: 93 MMOL/L — LOW (ref 98–107)
CO2 SERPL-SCNC: 28 MMOL/L — SIGNIFICANT CHANGE UP (ref 22–31)
CO2 SERPL-SCNC: 30 MMOL/L — SIGNIFICANT CHANGE UP (ref 22–31)
CREAT SERPL-MCNC: 0.9 MG/DL — SIGNIFICANT CHANGE UP (ref 0.5–1.3)
CREAT SERPL-MCNC: 0.91 MG/DL — SIGNIFICANT CHANGE UP (ref 0.5–1.3)
EOSINOPHIL # BLD AUTO: 0.08 K/UL — SIGNIFICANT CHANGE UP (ref 0–0.5)
EOSINOPHIL # BLD AUTO: 0.1 K/UL — SIGNIFICANT CHANGE UP (ref 0–0.5)
EOSINOPHIL NFR BLD AUTO: 1 % — SIGNIFICANT CHANGE UP (ref 0–6)
EOSINOPHIL NFR BLD AUTO: 1.5 % — SIGNIFICANT CHANGE UP (ref 0–6)
GLUCOSE SERPL-MCNC: 134 MG/DL — HIGH (ref 70–99)
GLUCOSE SERPL-MCNC: 93 MG/DL — SIGNIFICANT CHANGE UP (ref 70–99)
HCT VFR BLD CALC: 30.1 % — LOW (ref 39–50)
HCT VFR BLD CALC: 30.2 % — LOW (ref 39–50)
HGB BLD-MCNC: 10 G/DL — LOW (ref 13–17)
HGB BLD-MCNC: 9.4 G/DL — LOW (ref 13–17)
IMM GRANULOCYTES NFR BLD AUTO: 0.7 % — SIGNIFICANT CHANGE UP (ref 0–1.5)
IMM GRANULOCYTES NFR BLD AUTO: 1 % — SIGNIFICANT CHANGE UP (ref 0–1.5)
LYMPHOCYTES # BLD AUTO: 0.57 K/UL — LOW (ref 1–3.3)
LYMPHOCYTES # BLD AUTO: 0.63 K/UL — LOW (ref 1–3.3)
LYMPHOCYTES # BLD AUTO: 6.9 % — LOW (ref 13–44)
LYMPHOCYTES # BLD AUTO: 9.2 % — LOW (ref 13–44)
MAGNESIUM SERPL-MCNC: 1.9 MG/DL — SIGNIFICANT CHANGE UP (ref 1.6–2.6)
MAGNESIUM SERPL-MCNC: 2 MG/DL — SIGNIFICANT CHANGE UP (ref 1.6–2.6)
MCHC RBC-ENTMCNC: 25.3 PG — LOW (ref 27–34)
MCHC RBC-ENTMCNC: 27 PG — SIGNIFICANT CHANGE UP (ref 27–34)
MCHC RBC-ENTMCNC: 31.2 % — LOW (ref 32–36)
MCHC RBC-ENTMCNC: 33.1 % — SIGNIFICANT CHANGE UP (ref 32–36)
MCV RBC AUTO: 80.9 FL — SIGNIFICANT CHANGE UP (ref 80–100)
MCV RBC AUTO: 81.4 FL — SIGNIFICANT CHANGE UP (ref 80–100)
MONOCYTES # BLD AUTO: 0.79 K/UL — SIGNIFICANT CHANGE UP (ref 0–0.9)
MONOCYTES # BLD AUTO: 0.92 K/UL — HIGH (ref 0–0.9)
MONOCYTES NFR BLD AUTO: 11.1 % — SIGNIFICANT CHANGE UP (ref 2–14)
MONOCYTES NFR BLD AUTO: 11.5 % — SIGNIFICANT CHANGE UP (ref 2–14)
NEUTROPHILS # BLD AUTO: 5.22 K/UL — SIGNIFICANT CHANGE UP (ref 1.8–7.4)
NEUTROPHILS # BLD AUTO: 6.55 K/UL — SIGNIFICANT CHANGE UP (ref 1.8–7.4)
NEUTROPHILS NFR BLD AUTO: 76.4 % — SIGNIFICANT CHANGE UP (ref 43–77)
NEUTROPHILS NFR BLD AUTO: 79.3 % — HIGH (ref 43–77)
NON-GYNECOLOGICAL CYTOLOGY STUDY: SIGNIFICANT CHANGE UP
NRBC # FLD: 0 K/UL — SIGNIFICANT CHANGE UP (ref 0–0)
NRBC # FLD: 0 K/UL — SIGNIFICANT CHANGE UP (ref 0–0)
PHOSPHATE SERPL-MCNC: 3.2 MG/DL — SIGNIFICANT CHANGE UP (ref 2.5–4.5)
PHOSPHATE SERPL-MCNC: 4.2 MG/DL — SIGNIFICANT CHANGE UP (ref 2.5–4.5)
PLATELET # BLD AUTO: 284 K/UL — SIGNIFICANT CHANGE UP (ref 150–400)
PLATELET # BLD AUTO: 314 K/UL — SIGNIFICANT CHANGE UP (ref 150–400)
PMV BLD: 9.5 FL — SIGNIFICANT CHANGE UP (ref 7–13)
PMV BLD: 9.6 FL — SIGNIFICANT CHANGE UP (ref 7–13)
POTASSIUM SERPL-MCNC: 3.4 MMOL/L — LOW (ref 3.5–5.3)
POTASSIUM SERPL-MCNC: 3.8 MMOL/L — SIGNIFICANT CHANGE UP (ref 3.5–5.3)
POTASSIUM SERPL-SCNC: 3.4 MMOL/L — LOW (ref 3.5–5.3)
POTASSIUM SERPL-SCNC: 3.8 MMOL/L — SIGNIFICANT CHANGE UP (ref 3.5–5.3)
PROT SERPL-MCNC: 5.3 G/DL — LOW (ref 6–8.3)
PROT SERPL-MCNC: 5.7 G/DL — LOW (ref 6–8.3)
RBC # BLD: 3.71 M/UL — LOW (ref 4.2–5.8)
RBC # BLD: 3.72 M/UL — LOW (ref 4.2–5.8)
RBC # FLD: 17.2 % — HIGH (ref 10.3–14.5)
RBC # FLD: 17.4 % — HIGH (ref 10.3–14.5)
SODIUM SERPL-SCNC: 130 MMOL/L — LOW (ref 135–145)
SODIUM SERPL-SCNC: 132 MMOL/L — LOW (ref 135–145)
WBC # BLD: 6.84 K/UL — SIGNIFICANT CHANGE UP (ref 3.8–10.5)
WBC # BLD: 8.26 K/UL — SIGNIFICANT CHANGE UP (ref 3.8–10.5)
WBC # FLD AUTO: 6.84 K/UL — SIGNIFICANT CHANGE UP (ref 3.8–10.5)
WBC # FLD AUTO: 8.26 K/UL — SIGNIFICANT CHANGE UP (ref 3.8–10.5)

## 2020-07-10 PROCEDURE — 99291 CRITICAL CARE FIRST HOUR: CPT

## 2020-07-10 PROCEDURE — 99233 SBSQ HOSP IP/OBS HIGH 50: CPT | Mod: GC

## 2020-07-10 PROCEDURE — 99222 1ST HOSP IP/OBS MODERATE 55: CPT

## 2020-07-10 RX ORDER — POTASSIUM CHLORIDE 20 MEQ
40 PACKET (EA) ORAL ONCE
Refills: 0 | Status: COMPLETED | OUTPATIENT
Start: 2020-07-10 | End: 2020-07-10

## 2020-07-10 RX ADMIN — BUMETANIDE 2 MILLIGRAM(S): 0.25 INJECTION INTRAMUSCULAR; INTRAVENOUS at 22:13

## 2020-07-10 RX ADMIN — Medication 20 MILLIGRAM(S): at 18:58

## 2020-07-10 RX ADMIN — Medication 40 MILLIEQUIVALENT(S): at 09:31

## 2020-07-10 RX ADMIN — Medication 20 MILLIGRAM(S): at 06:20

## 2020-07-10 RX ADMIN — BUMETANIDE 2 MILLIGRAM(S): 0.25 INJECTION INTRAMUSCULAR; INTRAVENOUS at 06:20

## 2020-07-10 RX ADMIN — CEFTRIAXONE 100 MILLIGRAM(S): 500 INJECTION, POWDER, FOR SOLUTION INTRAMUSCULAR; INTRAVENOUS at 10:00

## 2020-07-10 RX ADMIN — BUMETANIDE 2 MILLIGRAM(S): 0.25 INJECTION INTRAMUSCULAR; INTRAVENOUS at 14:00

## 2020-07-10 NOTE — PROGRESS NOTE ADULT - ATTENDING COMMENTS
Patient with hypoplastic left heart and chronic passive congestion. Now on antibiotics for spontaneous bacterial peritonitis.  Suggest abdominal ultrasound exam to assess if sufficient ascites to allow paracentesis to assure adequate response to antibiotics.  Patient being optimized for cardiac cath.  Suggest plan to perform wedged hepatic venous pressure gradient and transjugular liver biopsy, either at time of cardiac cath or as separate procedure.  This information will help to inform plans for future consideration of heart transplant or other interventions.

## 2020-07-10 NOTE — DIETITIAN INITIAL EVALUATION ADULT. - OTHER INFO
Patient is a 29y old  Male who presents with a chief complaint of Acute decompensated heart failure . Pt. reports following Keto diet prior to admission , no known food allergies, fairly compliant to therapeutic diet - low salt . Wt. decrease in hospital due to fluid loss .

## 2020-07-10 NOTE — PROGRESS NOTE ADULT - SUBJECTIVE AND OBJECTIVE BOX
CHIEF COMPLAINT: Patient is a 29y old  Male who presents with a chief complaint of Acute decompensated heart failure (10 Jul 2020 09:37)    Interval Events:  Overnight no events, BP tolerating sildenifil.  No shortness of breath, can lie flat.  no abdominal pain, no chest pain.  Stooling well.  Urinating well with good UOP.    REVIEW OF SYSTEMS:  In additional the that documented in the HPI, the additional ROS was obtained:    CONSTITUTIONAL: No fever, no chills  EYES: no change in vision, no blurred vision  HEENT: no trouble swallowing, no trouble speaking, no sore throat  NECK: no pain, no stiffness  CV: no chest pain, no palpitations  RESP: no cough, no shortness of breath  GI: no abdominal pain, no nausea, no vomiting, no diarrhea, no constipation, no BRBPR or melena  : No dysuria, no frequency  NEURO: no headache, no new numbness, no weakness, no dizziness  SKIN: no new rashes  HEME: No easy bruising or bleeding  MSK: No joint pain, no recent trauma  Tariq Abernathy M.D. -Resident     OBJECTIVE:  ICU Vital Signs Last 24 Hrs  T(C): 36.7 (10 Jul 2020 04:00), Max: 37.3 (09 Jul 2020 20:00)  T(F): 98 (10 Jul 2020 04:00), Max: 99.2 (09 Jul 2020 20:00)  HR: 76 (10 Jul 2020 09:00) (70 - 82)  BP: 119/54 (10 Jul 2020 06:19) (96/50 - 124/64)  BP(mean): 69 (10 Jul 2020 06:19) (60 - 78)  ABP: --  ABP(mean): --  RR: 19 (10 Jul 2020 09:00) (14 - 31)  SpO2: 99% (10 Jul 2020 09:00) (89% - 99%)    07-09 @ 07:01  -  07-10 @ 07:00  --------------------------------------------------------  IN: 496.8 mL / OUT: 2850 mL / NET: -2353.2 mL    07-10 @ 07:01  -  07-10 @ 12:07  --------------------------------------------------------  IN: 257.5 mL / OUT: 0 mL / NET: 257.5 mL    CAPILLARY BLOOD GLUCOSE      PHYSICAL EXAM:  Vital signs reviewed.  CONSTITUTIONAL: NAD, awake, interactive, sitting in chair eating breakfast  HEAD: Normocephalic; atraumatic  EYES: EOMI, no nystagmus, no conjunctival injection, no scleral icterus  MOUTH/THROAT:  MMM  NECK: Trachea midline, no JVD  CV: Normal S1, S2; no audible murmurs; extremities WWP  RESP: normal work of breathing; CTAB, no stridor  ABD: soft, distended but smaller than yesterday, nontender  : deferred  MSK/EXT: no edema, +prominent vasculature on legs b/l.  normal ROM in all four extremities, no deformities  SKIN: No gross rashes or lesions on exposed skin, no significant trophic changes  NEURO: aaox3, moving all extremities purposefully and spontaneously    HOSPITAL MEDICATIONS:  MEDICATIONS  (STANDING):  buMETAnide Injectable 2 milliGRAM(s) IV Push every 8 hours  cefTRIAXone   IVPB      cefTRIAXone   IVPB 2000 milliGRAM(s) IV Intermittent every 24 hours  chlorhexidine 4% Liquid 1 Application(s) Topical <User Schedule>  digoxin     Tablet 0.125 milliGRAM(s) Oral daily  ferrous    sulfate 325 milliGRAM(s) Oral daily  heparin   Injectable 5000 Unit(s) SubCutaneous every 8 hours  melatonin 3 milliGRAM(s) Oral at bedtime  milrinone Infusion 0.125 MICROgram(s)/kG/Min (2.6 mL/Hr) IV Continuous <Continuous>  sildenafil (REVATIO) 20 milliGRAM(s) Oral every 12 hours  spironolactone 25 milliGRAM(s) Oral daily    MEDICATIONS  (PRN):  aluminum hydroxide/magnesium hydroxide/simethicone Suspension 30 milliLiter(s) Oral every 6 hours PRN Dyspepsia      LABS:  (07-10 @ 06:40)                        10.0  6.84 )-----------( 284                 30.2    Neutrophils = 5.22 (76.4%)  Lymphocytes = 0.63 (9.2%)  Eosinophils = 0.10 (1.5%)  Basophils = 0.05 (0.7%)  Monocytes = 0.79 (11.5%)  Bands = --%    WBC Trend: 6.84<--, 8.05<--, 7.32<--  Hb Trend: 10.0<--, 10.4<--, 9.7<--, 11.4<--, 11.2<--  Plt Trend: 284<--, 315<--, 262<--, 317<--, 270<--  07-10    132<L>  |  93<L>  |  23  ----------------------------<  93  3.4<L>   |  30  |  0.91    Ca    8.6      10 Jul 2020 06:40  Phos  4.2     07-10  Mg     2.0     07-10    TPro  5.3<L>  /  Alb  2.9<L>  /  TBili  0.2  /  DBili  x   /  AST  12  /  ALT  12  /  AlkPhos  139<H>  07-10    Creatinine Trend: 0.91<--, 0.77<--, 0.70<--, 0.72<--, 0.66<--, 0.69<--      MICROBIOLOGY:   Blood Cx: ngtd  Peritoneal cx: ngtd

## 2020-07-10 NOTE — PROGRESS NOTE ADULT - SUBJECTIVE AND OBJECTIVE BOX
Subjective    Seen and examined.  No complaints, minimal pain.    Objective    Vital signs  T(F): , Max: 99.2 (07-09-20 @ 20:00)  HR: 84 (07-10-20 @ 13:00)  BP: 94/60 (07-10-20 @ 11:00)  SpO2: 96% (07-10-20 @ 13:00)  Wt(kg): --    Output     OUT:    Voided: 2850 mL  Total OUT: 2850 mL    Total NET: -2850 mL          Physical Exam  Gen NAD  Abd ascites    circumcised penis, no lesions or discharges.  Left hemiscrotum testicle soft, nontender, normal lie no masses, right hemiscrotom enlarged, firm fluid filled nontender.     Labs      07-10 @ 06:40    WBC 6.84  / Hct 30.2  / SCr 0.91     07-09 @ 18:45    WBC 8.05  / Hct 31.4  / SCr 0.77       Imaging  < from: CT Abdomen and Pelvis w/ IV Cont (07.08.20 @ 22:21) >    EXAM:  CT ABDOMEN AND PELVIS IC        PROCEDURE DATE:  Jul 8 2020         INTERPRETATION:  CLINICAL INFORMATION: New ascites, cirrhosis. Evaluate for microperforation causing SBP.     COMPARISON: Abdominal ultrasound 7/7/2020.    PROCEDURE:   CT of the Abdomen and Pelvis was performed with intravenous contrast.   Intravenous contrast: 90 ml Omnipaque 350. 10 ml discarded.  Oral contrast: None.  Sagittal and coronal reformats were performed.    FINDINGS:  LOWER CHEST: Large left pleural effusion with left lower lobe collapse. Status Fontan procedure.    LIVER: Cirrhosis.  BILE DUCTS: Normal caliber.  GALLBLADDER: Wall edema, nonspecific in the setting of ascites.  SPLEEN: Within normal limits.  PANCREAS: Within normal limits.  ADRENALS: Within normal limits.  KIDNEYS/URETERS: Within normal limits.    BLADDER: Minimally distended.  REPRODUCTIVE ORGANS: Prostate within normal limits.    BOWEL: No bowel obstruction. Appendix is normal.  PERITONEUM: Small to moderate volume ascites. There is enhancement and thickening of the peritoneum in the cul-de-sac. No free air.  VESSELS: Within normal limits.  RETROPERITONEUM/LYMPH NODES: Enlarged mesenteric lymph nodes are nonspecific. For example, a 1.8 cm node on image 4, series 83).  ABDOMINAL WALL: Bilateral fluid containing inguinal hernias. Right hydrocele.  BONES: Within normal limits.    IMPRESSION:     Small to moderate volume ascites with mild peritoneal enhancement, compatible with known peritonitis. No free air.    Large left pleural effusion.              CHING SURESH M.D., RADIOLOGY RESIDENT  This document has been electronically signed.  CECIL VERDUGO M.D., ATTENDING RADIOLOGIST  This document has been electronically signed. Jul 9 2020  9:33AM                  < end of copied text >

## 2020-07-10 NOTE — PROGRESS NOTE PEDS - ASSESSMENT
To summarize, Arie is a 28 y/o with history of HLHS s/p Fontan palliation. The 3 stage repair was performed at [facility]. Extracardiac Fontan completion was performed in 1997 by Dr Bennett followed by device closure of the Fenestration is 1999 with cardiac cath. He had been lost to follow up for several years and last year started following up with our adult congenital team. Unfortunately his presentation is consistent with failing Fontan with worsening congestive heart failure in the form of severe ascites, and significant diminution of his single ventricle function. He is admitted for optimization of the heart failure management and peritoneal tap. Because of the complex physiology of single ventricle and his tenuous hemodynamic state, he needs continuous ICU monitoring.   Plan   CV:   - Continuous tele monitoring. Please page Cardiology if there is concern of any arrhythmias.   - Continue Bumex 2 mg IV q 12 hr for now. He is responding appropriately to it. Fluid goal at least negative ~ 1 L/day. Continue Aldactone.   - Continue milrinone at 0.1 mc/kg/min. Continue the current dose of digoxin, may need digoxin dose to be increased before discharge.   - Because of the active infection, cardiac cath will be deferred for now.   - Sildenafil has not been started because of the borderline low BP's at night, in the setting of an active infection also.   - Recommend to start low dose nor-epi drip because of borderline low BP's in the setting of active infection, even though patient clinically appears well. Assess urine output, perfusion and monitor BP's closely after nor epi is started.   Renal/:   - Strict I's and O's.  - Will continue to monitor electrolytes and BUN/Cr   - Scrotal US ordered, f/u on results and consult urology depending upon results.   Resp:   - On RA, goal sats >88%. Uses O2 as needed for comfort.   GI:   - Plan for peritoneal tap this afternoon.   - Hepatology consulted and recommended labs and MRI elastography to rule out fibrosis. Appreciate the recs.   - We would recommend to do abdominal CT to rule out micro-perforations, in the light of SBP infection. To summarize, Arie is a 28 y/o with history of HLHS s/p Fontan palliation. The 3 stage repair was performed at [facility]. Extracardiac Fontan completion was performed in 1997 by Dr Bennett followed by device closure of the Fenestration is 1999 with cardiac cath. He had been lost to follow up for several years and last year started following up with our adult congenital team. Unfortunately his presentation is consistent with failing Fontan with worsening congestive heart failure in the form of severe ascites, and significant diminution of his single ventricle function. He is admitted for optimization of the heart failure management and peritoneal tap. Because of the complex physiology of single ventricle and his tenuous hemodynamic state, he needs continuous ICU monitoring.   Plan   CV:   - Continuous tele monitoring. Please page Cardiology if there is concern of any arrhythmias.   - Continue Bumex 2 mg IV q 8 hr. Fluid goal at least negative ~ 1 L/day. Continue Aldactone.   - Continue milrinone at 0.1 mc/kg/min. Continue the current dose of digoxin, may need digoxin dose to be increased before discharge.   - Because of the active infection, cardiac cath was deferred. Tentative plan is for cardiac cath next Tuesday.   - Sildenafil 20 mg TID to optimize hemodynamics.   Renal/:   - Strict I's and O's.  - Will continue to monitor electrolytes and BUN/Cr   - Scrotal US showed hydrocele, consistent with Heart Failure. Urology consulted.   Resp:   - On RA, goal sats >88%. Uses O2 as needed for comfort.   - Plan to repeat CXR on Sunday (7/12) to see if there is a sizeable effusion that would benefit from a tap.   GI:   - s/p peritoneal tap. Drained 4 L.  - Plan was to re-tap and send peritoneal fluid for cell count, however bedside US did not show a fluid pocket that would be amenable to re-tap.   - Hepatology consulted and recommended labs and MRI elastography to rule out fibrosis. Appreciate the recs.   ID:   - Peritoneal fluid suggestive of SBP, started on Ceftriaxone.

## 2020-07-10 NOTE — PROGRESS NOTE ADULT - ASSESSMENT
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  Sent from clinic for IV diuresis and milrinone therapy.      #Neuro   - No acute issues    #Cardiovascular  Acute Decompensated Heart Failure   - continue diuresis with Bumex 2mg q8h and home aldactone; goal at least 1L net negative per day   - continue milrinone gtt   - continue sildenafil 20mg q12h   - Continue home digoxin   - HOLD home lisinopril per Congenital Heart team   - LHC arrangements per Peds Congenital Heart team    #Respiratory   - Patient baseline sat 88%   - Currently satting 89-95% on room air  Left Pleural Effusion   - Peds Cards would like a thoracentesis prior to cardiac cath    #Gastrointestinal  Spontaneous bacterial peritonitis   - Peritoneal fluid analysis demonstrates PMNs 1211, gram stain with PMNs no organism seen.  Diagnostic for SBP.  Patient remains asymptomatic, no fevers, no WBC elevation, no abdominal pain   - Ceftriaxone 2g q24, 5 day course ending 7/12   - No viable fluid pocket today for repeat paracentesis  Gastritis   - Continue home protonix   - History of GI bleed   - Patient notes dark stools since restarting iron, Hgb remains stable    #Renal   - Continue diuresis with Bumex gtt and aldactone   - Baseline Cr appears to be ~0.8    #Infectious Disease  Spontaneous Bacterial Peritonitis   - as above, ceftriaxone 2g q24 ending 7/12   - Repeat diagnostic paracentesis 7/10    #Endo   - No acute issues    #Heme  Anemia   - Has baseline anemia usually ~13.0   - Current hgb 9-11 this hospitalization; all cell lines down including plts and wbc   - Continue to get CBC q12 to monitor, and have active type and screen   - Anemia labs indicate no hemolysis, adequate folate and B12, and slight iron deficiency.  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  Sent from clinic for IV diuresis and milrinone therapy.      #Neuro   - No acute issues    #Cardiovascular  Acute Decompensated Heart Failure   - continue diuresis with Bumex 2mg q8h and home aldactone; goal at least 1L net negative per day   - continue milrinone gtt   - continue sildenafil 20mg q12h   - Continue home digoxin   - HOLD home lisinopril per Congenital Heart team   - LHC arrangements per Peds Congenital Heart team    #Respiratory   - Patient baseline sat 88%   - Currently satting 89-95% on room air  Left Pleural Effusion   - Peds Cards would like a thoracentesis prior to cardiac cath   - May defer until Monday for cath Tuesday    #Gastrointestinal  Spontaneous bacterial peritonitis   - Peritoneal fluid analysis demonstrates PMNs 1211, gram stain with PMNs no organism seen.  Diagnostic for SBP.  Patient remains asymptomatic, no fevers, no WBC elevation, no abdominal pain   - Ceftriaxone 2g q24, 5 day course ending 7/12   - No viable fluid pocket today for repeat paracentesis  Gastritis   - Continue home protonix   - History of GI bleed   - Patient notes dark stools since restarting iron, Hgb remains stable    #Renal   - Continue diuresis with Bumex gtt and aldactone   - Baseline Cr appears to be ~0.8    #Infectious Disease  Spontaneous Bacterial Peritonitis   - as above, ceftriaxone 2g q24 ending 7/12   - No viable pocket for repeat para    #Endo   - No acute issues    #Heme  Anemia   - Has baseline anemia usually ~13.0   - Current hgb 9-11 this hospitalization; all cell lines down including plts and wbc   - Continue to get CBC q12 to monitor, and have active type and screen   - Anemia labs indicate no hemolysis, adequate folate and B12, and slight iron deficiency.  Will continue iron supplementation

## 2020-07-10 NOTE — PROGRESS NOTE ADULT - SUBJECTIVE AND OBJECTIVE BOX
Chief Complaint:  Patient is a 29y old  Male who presents with a chief complaint of Acute decompensated heart failure (10 Jul 2020 09:37)      Interval Events: Patient continues on milrinone assisted diuresis, on bumex/aldactone, making good UOP. On CTX for SBP, states abd discomfort improved, no resp distress.     Allergies:  No Known Allergies      Hospital Medications:  aluminum hydroxide/magnesium hydroxide/simethicone Suspension 30 milliLiter(s) Oral every 6 hours PRN  buMETAnide Injectable 2 milliGRAM(s) IV Push every 8 hours  cefTRIAXone   IVPB      cefTRIAXone   IVPB 2000 milliGRAM(s) IV Intermittent every 24 hours  chlorhexidine 4% Liquid 1 Application(s) Topical <User Schedule>  digoxin     Tablet 0.125 milliGRAM(s) Oral daily  ferrous    sulfate 325 milliGRAM(s) Oral daily  heparin   Injectable 5000 Unit(s) SubCutaneous every 8 hours  melatonin 3 milliGRAM(s) Oral at bedtime  milrinone Infusion 0.125 MICROgram(s)/kG/Min IV Continuous <Continuous>  sildenafil (REVATIO) 20 milliGRAM(s) Oral every 12 hours  spironolactone 25 milliGRAM(s) Oral daily        PHYSICAL EXAM:   Vital Signs:  Vital Signs Last 24 Hrs  T(C): 36.7 (10 Jul 2020 04:00), Max: 37.3 (09 Jul 2020 20:00)  T(F): 98 (10 Jul 2020 04:00), Max: 99.2 (09 Jul 2020 20:00)  HR: 76 (10 Jul 2020 09:00) (70 - 82)  BP: 119/54 (10 Jul 2020 06:19) (96/50 - 124/64)  BP(mean): 69 (10 Jul 2020 06:19) (60 - 78)  RR: 19 (10 Jul 2020 09:00) (14 - 31)  SpO2: 99% (10 Jul 2020 09:00) (89% - 99%)  Daily     Daily     GENERAL:  No acute distress  HEENT:  Normocephalic/atraumtic,  no scleral icterus  CHEST:  Clear to auscultation bilaterally, no wheezes/rales/ronchi, no accessory muscle use  HEART:  Regular rate and rhythm, no murmurs/rubs/gallops  ABDOMEN:  Soft, non-tender, distended. RLQ para site c/d/i  : R scrotal swelling, nontender, reducible into abdomen, L groin inguinal hernia  EXTREMITIES:  No cyanosis, clubbing, or edema  SKIN:  No rash/erythema/ecchymoses/petechiae/wounds/abscess/warm/dry  NEURO:  Alert and oriented x 3, no asterixis, no tremor    LABS:                        10.0   6.84  )-----------( 284      ( 10 Jul 2020 06:40 )             30.2     Mean Cell Volume: 81.4 fL (07-10-20 @ 06:40)    07-10    132<L>  |  93<L>  |  23  ----------------------------<  93  3.4<L>   |  30  |  0.91    Ca    8.6      10 Jul 2020 06:40  Phos  4.2     07-10  Mg     2.0     07-10    TPro  5.3<L>  /  Alb  2.9<L>  /  TBili  0.2  /  DBili  x   /  AST  12  /  ALT  12  /  AlkPhos  139<H>  07-10    LIVER FUNCTIONS - ( 10 Jul 2020 06:40 )  Alb: 2.9 g/dL / Pro: 5.3 g/dL / ALK PHOS: 139 u/L / ALT: 12 u/L / AST: 12 u/L / GGT: x             Ascites fluid:  Cell count: 13K RBC, 1522 WBC, 83%N    Ferritin: 254  TIBC: 205  IgG 797  IgA 113  IgM 3.4    Alpha-1 AT: 270    HAV: IgG positive  HBV: immune  HCV: neg          Imaging:  CT A/P (7/8/20):  Small to moderate volume ascites with mild peritoneal enhancement, compatible with known peritonitis. No free air.  Large left pleural effusion.

## 2020-07-10 NOTE — PROGRESS NOTE ADULT - ASSESSMENT
This is a 30 yo M w/ PMHx hypoplastic L heart s/p Fontan procedure, admitted for decompensated heart failure 2/2 fontan failure c/b worsening ascites, now with 8.7cm right hydrocele & Left varicoceles.  Patient is currently being optimized for cardiac cath and is being treated for heart failure.  Increased abdominal fluid and pressure likely the etiology behind the hydrocele development, and it is likely to improve after these issues are addressed.     Recommendation:  - No acute surgical intervention,   - Heart issues take precedence, and will likely improve hydrocele  - Patient can follow with Dr. Crowder as an outpatient for further surgical evaluation [phone]    Urology  [id number]

## 2020-07-10 NOTE — PROGRESS NOTE PEDS - SUBJECTIVE AND OBJECTIVE BOX
DISCHARGE CRITERIA:   - Off all IV medications   - Has a heart failure oral home regimen   - Peritoneal tap performed.   - Optimization of fluid balance     INTERVAL HISTORY:     RESPIRATORY SUPPORT: RA, sats ~88%-90%    NUTRITION: Sodium and Cholesterol restricted diet     INTAKE/OUTPUT:       @ 07:01  -   @ 07:00  --------------------------------------------------------  IN: 302 mL / OUT: 1625 mL / NET: -1323 mL      INTRAVASCULAR ACCESS: PIV     MEDICATIONS:  buMETAnide Injectable 2 milliGRAM(s) IV Push every 8 hours  digoxin     Tablet 0.125 milliGRAM(s) Oral daily  milrinone Infusion 0.125 MICROgram(s)/kG/Min IV Continuous <Continuous>  sildenafil (REVATIO) 20 milliGRAM(s) Oral every 12 hours  spironolactone 25 milliGRAM(s) Oral daily  cefTRIAXone   IVPB      cefTRIAXone   IVPB 2000 milliGRAM(s) IV Intermittent every 24 hours  melatonin 3 milliGRAM(s) Oral at bedtime  ferrous    sulfate 325 milliGRAM(s) Oral daily  heparin   Injectable 5000 Unit(s) SubCutaneous every 8 hours    PHYSICAL EXAMINATION:  ICU Vital Signs Last 24 Hrs  T(C): 37.2 (10 Jul 2020 00:00), Max: 37.3 (2020 20:00)  T(F): 99 (10 Jul 2020 00:00), Max: 99.2 (2020 20:00)  HR: 77 (10 Jul 2020 06:19) (70 - 93)  BP: 119/54 (10 Jul 2020 06:19) (96/50 - 124/64)  BP(mean): 69 (10 Jul 2020 06:19) (60 - 78)  RR: 15 (10 Jul 2020 06:19) (14 - 31)  SpO2: 93% (10 Jul 2020 06:19) (88% - 96%)    General - non-dysmorphic appearance, well-developed, in no distress.  Skin - no rash, no desquamation, no cyanosis.  Eyes / ENT - no conjunctival injection, sclerae anicteric, external ears & nares normal, mucous membranes moist.  Pulmonary - normal inspiratory effort, no retractions, lungs clear to auscultation bilaterally, no wheezes, no rales.  Cardiovascular - normal rate, regular rhythm, normal S1 & single S2, 2/6 holosystolic murmur at the LLSB and apex, no rubs, no gallops, capillary refill < 2sec, normal pulses.  Gastrointestinal - soft, distended, non-tender, hepatomegaly difficult to assess because of ascities.  Musculoskeletal - no joint swelling, no clubbing, no edema.  Neurologic / Psychiatric - alert, oriented as age-appropriate, affect appropriate, moves all extremities, normal tone.    LABORATORY TESTS:                            10.4  CBC:   8.05 )-----------( 315   (20 @ 18:45)                          31.4               129   |  91    |  22                 Ca: 8.4    BMP:   ----------------------------< 157    M.8   (20 @ 18:45)             4.6    |  25    | 0.77               Ph: 3.9      LFT:     TPro: 5.7 / Alb: 2.7 / TBili: 0.2 / DBili: x / AST: 29 / ALT: 12 / AlkPhos: 135   (20 @ 18:45)    COAG: PT: 15.8 / PTT: 31.4 / INR: 1.36   (20 @ 06:00)     CARDIAC MARKERS:             High-Sensitivity Troponin: 11   (20 @ 06:00)      VBG:   pH: 7.49 / pCO2: 37 / pO2: 39 / HCO3: 28 / Base Excess: 4.1 / SaO2: 71.5   (20 @ 15:57)      IMAGING STUDIES:  Electrocardiogram - 2020. Sinus rhythm at 92 bpm with a prolonged NM interval (230 msec). Complete Right Bundle Branch Block.  Tele: NSR with Wenkebach, No ectopy or arrhythmias.   Chest x-ray - (20) Left pleural effusion     Echocardiogram -   2020. (Please see full report for more details)   The right (single ventricle) function is severely diminished as compared to the study of 2020.  There is phasic flow in the IVC to Fontan circuit and the right sided Justice shunt is not clearly defined but there does not appear to be any obstruction to flow.  The pulmonary arteries continue to be non-visualized well. The study from 2020 showed sinus rhythm with a very prolonged NM interval. Wenkebach periodicity was also noted and there were rare PVCs an couplets. DISCHARGE CRITERIA:   - Off all IV medications   - Has a heart failure oral home regimen   - Peritoneal tap performed.   - Optimization of fluid balance     INTERVAL HISTORY: No acute events, continues on RA with baseline sats >88%. Fluid balance -2L/24 hrs. Stable BP's on sildenafil.     RESPIRATORY SUPPORT: RA, sats ~88%-90%    NUTRITION: Sodium and Cholesterol restricted diet     INTAKE/OUTPUT:     @ 07:01  -  -10 @ 07:00  --------------------------------------------------------  IN: 496.8 mL / OUT: 2850 mL / NET: -2353.2 mL    INTRAVASCULAR ACCESS: PIV     MEDICATIONS:  buMETAnide Injectable 2 milliGRAM(s) IV Push every 8 hours  digoxin     Tablet 0.125 milliGRAM(s) Oral daily  milrinone Infusion 0.125 MICROgram(s)/kG/Min IV Continuous <Continuous>  sildenafil (REVATIO) 20 milliGRAM(s) Oral every 12 hours  spironolactone 25 milliGRAM(s) Oral daily  cefTRIAXone   IVPB      cefTRIAXone   IVPB 2000 milliGRAM(s) IV Intermittent every 24 hours  melatonin 3 milliGRAM(s) Oral at bedtime  ferrous    sulfate 325 milliGRAM(s) Oral daily  heparin   Injectable 5000 Unit(s) SubCutaneous every 8 hours      PHYSICAL EXAMINATION:  ICU Vital Signs Last 24 Hrs  T(C): 36.7 (10 Jul 2020 04:00), Max: 37.3 (2020 20:00)  T(F): 98 (10 Jul 2020 04:00), Max: 99.2 (2020 20:00)  HR: 85 (10 Jul 2020 16:00) (70 - 87)  BP: 100/60 (10 Jul 2020 16:00) (94/60 - 124/64)  BP(mean): 73 (10 Jul 2020 16:00) (60 - 93)  RR: 15 (10 Jul 2020 16:00) (14 - 33)  SpO2: 96% (10 Jul 2020 16:00) (89% - 99%)    General - non-dysmorphic appearance, well-developed, in no distress.  Skin - no rash, no desquamation, no cyanosis.  Eyes / ENT - no conjunctival injection, sclerae anicteric, external ears & nares normal, mucous membranes moist.  Pulmonary - normal inspiratory effort, no retractions, lungs clear to auscultation bilaterally, no wheezes, no rales.  Cardiovascular - normal rate, regular rhythm, normal S1 & single S2, 2/6 holosystolic murmur at the LLSB and apex, no rubs, no gallops, capillary refill < 2sec, normal pulses.  Gastrointestinal - soft, distended improved, non-tender, hepatomegaly difficult to assess because of ascities.  Musculoskeletal - no joint swelling, no clubbing, no edema.  Neurologic / Psychiatric - alert, oriented as age-appropriate, affect appropriate, moves all extremities, normal tone.    LABORATORY TESTS:  LABORATORY TESTS:                          10.0  CBC:   6.84 )-----------( 284   (07-10-20 @ 06:40)                          30.2               132   |  93    |  23                 Ca: 8.6    BMP:   ----------------------------< 93     M.0   (07-10-20 @ 06:40)             3.4    |  30    | 0.91               Ph: 4.2      LFT:     TPro: 5.3 / Alb: 2.9 / TBili: 0.2 / DBili: x / AST: 12 / ALT: 12 / AlkPhos: 139   (07-10-20 @ 06:40)    COAG: PT: 15.8 / PTT: 31.4 / INR: 1.36   (20 @ 06:00)     CARDIAC MARKERS:             High-Sensitivity Troponin: 11   (20 @ 06:00)             Pro-BNP: x   (20 @ 06:00)  CARDIAC MARKERS:             High-Sensitivity Troponin: 11   (20 @ 15:57)             Pro-BNP: 772.7   (20 @ 15:57)        VBG:   pH: 7.49 / pCO2: 37 / pO2: 39 / HCO3: 28 / Base Excess: 4.1 / SaO2: 71.5   (20 @ 15:57)        IMAGING STUDIES:  Electrocardiogram - 2020. Sinus rhythm at 92 bpm with a prolonged SD interval (230 msec). Complete Right Bundle Branch Block.  Tele: NSR with Wenkebach, No ectopy or arrhythmias.   Chest x-ray - (20) Left pleural effusion     Echocardiogram -   2020. (Please see full report for more details)   The right (single ventricle) function is severely diminished as compared to the study of 2020.  There is phasic flow in the IVC to Fontan circuit and the right sided Justice shunt is not clearly defined but there does not appear to be any obstruction to flow.  The pulmonary arteries continue to be non-visualized well. The study from 2020 showed sinus rhythm with a very prolonged SD interval. Wenkebach periodicity was also noted and there were rare PVCs an couplets.

## 2020-07-10 NOTE — PROGRESS NOTE ADULT - ATTENDING COMMENTS
Agree with above. Seen and examined with team on rounds. Critically ill requiring frequent bedside visits. Improving respiratory status overall. Will continue milrinone and bumex.

## 2020-07-10 NOTE — PROGRESS NOTE ADULT - ASSESSMENT
Impression:  28 yo M w/ PMHx hypoplastic L heart s/p Fontan procedure, GIB (1/2020) presenting w/ acute on chronic ADHF and worsening ascites, hepatology consulted for ascites management, pt now s/p LVP w/ SBP.     Etiology of patient's worsening ascites likely from congestive hepatopathy and subsequent cirrhosis. However difficult to determine whether ascites is entirely attributed to heart failure and congestive hepatopathy, or whether prolonged liver damage and cirrhosis have led to independent liver fibrosis/cirrhosis. Understanding this can help guide Mr. Acevedo's candidacy for heart transplant. To evaluate his liver fibrosis and/or portal pressures, we can recommend three potential options. First would be assessment of portal pressures by measuring wedged hepatic venous pressures. Second, to assess level of fibrosis, as an outpatient Mr. Acevedo can undergo MR elastography (not done as inpatient). He either way warrants liver imaging to assess for mass lesion, and therefore can undergo MRI liver/ MR elastography as outpatient. Third, and most invasive, would be a liver biopsy to assess level of fibrosis.     Additionally, patient's ascites fluid was positive for SBP by cell count. It is not uncommon for hospitalized patients with ascites to have asymptomatic SBP, and it may reflect transient bacterial translocation across bowel mucosa. His prior GIB (2/2020) which demonstrated duodenitis and was treated with APC likely does not explain the current SBP. CT A/P does not show any bowel inflammation.     Recommendations:  - treatment for SBP: c/w CTX daily, can defer albumin infusion as patient with poor cardiac function and systemically volume overload  - if tapable pocket, would recommend repeat paracentesis today to assess improvement in cell count and response to abx therapy  - f/u serology labs:  HAV, HBVsAb, HBVsAg, HCV Ab, ceruloplasm, alpha-1 antitrypsin (phenotype), AFP, iron sat/ferritin/TIBC, immunoglobulin panel (IgG, IgA, IgM), MICHELLE, anti-smooth muscle, anti-mitochondrial, Anti-liver kidney microsomal antibody  - evaluate of fibrosis as described above  - evaluate portal pressures as described above  - timing of cardiac cath as per cardiology, likely wait until SBP treated  - diuretics management per MICU/cardiology    Thank you for this interesting consult. Hepatology will continue to follow.     Sylvester Mccray, PGY4  Gastroenterology Fellow  Available on [employer] Teams  [phone] (Long Range Pager)    After 5pm, please contact the on-call GI fellow. [phone]

## 2020-07-11 DIAGNOSIS — Z98.890 OTHER SPECIFIED POSTPROCEDURAL STATES: ICD-10-CM

## 2020-07-11 DIAGNOSIS — Q23.4 HYPOPLASTIC LEFT HEART SYNDROME: ICD-10-CM

## 2020-07-11 DIAGNOSIS — I50.43 ACUTE ON CHRONIC COMBINED SYSTOLIC (CONGESTIVE) AND DIASTOLIC (CONGESTIVE) HEART FAILURE: ICD-10-CM

## 2020-07-11 LAB
ALBUMIN SERPL ELPH-MCNC: 2.7 G/DL — LOW (ref 3.3–5)
ALP SERPL-CCNC: 135 U/L — HIGH (ref 40–120)
ALT FLD-CCNC: 12 U/L — SIGNIFICANT CHANGE UP (ref 4–41)
ANION GAP SERPL CALC-SCNC: 9 MMO/L — SIGNIFICANT CHANGE UP (ref 7–14)
AST SERPL-CCNC: 13 U/L — SIGNIFICANT CHANGE UP (ref 4–40)
BASOPHILS # BLD AUTO: 0.05 K/UL — SIGNIFICANT CHANGE UP (ref 0–0.2)
BASOPHILS # BLD AUTO: 0.08 K/UL — SIGNIFICANT CHANGE UP (ref 0–0.2)
BASOPHILS NFR BLD AUTO: 0.7 % — SIGNIFICANT CHANGE UP (ref 0–2)
BASOPHILS NFR BLD AUTO: 1.2 % — SIGNIFICANT CHANGE UP (ref 0–2)
BILIRUB SERPL-MCNC: 0.2 MG/DL — SIGNIFICANT CHANGE UP (ref 0.2–1.2)
BLD GP AB SCN SERPL QL: NEGATIVE — SIGNIFICANT CHANGE UP
BUN SERPL-MCNC: 20 MG/DL — SIGNIFICANT CHANGE UP (ref 7–23)
CALCIUM SERPL-MCNC: 8.4 MG/DL — SIGNIFICANT CHANGE UP (ref 8.4–10.5)
CHLORIDE SERPL-SCNC: 93 MMOL/L — LOW (ref 98–107)
CO2 SERPL-SCNC: 27 MMOL/L — SIGNIFICANT CHANGE UP (ref 22–31)
CREAT SERPL-MCNC: 0.75 MG/DL — SIGNIFICANT CHANGE UP (ref 0.5–1.3)
EOSINOPHIL # BLD AUTO: 0.07 K/UL — SIGNIFICANT CHANGE UP (ref 0–0.5)
EOSINOPHIL # BLD AUTO: 0.11 K/UL — SIGNIFICANT CHANGE UP (ref 0–0.5)
EOSINOPHIL NFR BLD AUTO: 1 % — SIGNIFICANT CHANGE UP (ref 0–6)
EOSINOPHIL NFR BLD AUTO: 1.7 % — SIGNIFICANT CHANGE UP (ref 0–6)
GLUCOSE SERPL-MCNC: 115 MG/DL — HIGH (ref 70–99)
HCT VFR BLD CALC: 26.4 % — LOW (ref 39–50)
HCT VFR BLD CALC: 27.5 % — LOW (ref 39–50)
HGB BLD-MCNC: 8.5 G/DL — LOW (ref 13–17)
HGB BLD-MCNC: 8.7 G/DL — LOW (ref 13–17)
IMM GRANULOCYTES NFR BLD AUTO: 0.8 % — SIGNIFICANT CHANGE UP (ref 0–1.5)
IMM GRANULOCYTES NFR BLD AUTO: 1.2 % — SIGNIFICANT CHANGE UP (ref 0–1.5)
LKM AB SER-ACNC: < 20.1 UNITS — SIGNIFICANT CHANGE UP (ref 0–20)
LYMPHOCYTES # BLD AUTO: 0.43 K/UL — LOW (ref 1–3.3)
LYMPHOCYTES # BLD AUTO: 0.49 K/UL — LOW (ref 1–3.3)
LYMPHOCYTES # BLD AUTO: 6.5 % — LOW (ref 13–44)
LYMPHOCYTES # BLD AUTO: 6.8 % — LOW (ref 13–44)
MAGNESIUM SERPL-MCNC: 2 MG/DL — SIGNIFICANT CHANGE UP (ref 1.6–2.6)
MCHC RBC-ENTMCNC: 25.4 PG — LOW (ref 27–34)
MCHC RBC-ENTMCNC: 25.8 PG — LOW (ref 27–34)
MCHC RBC-ENTMCNC: 31.6 % — LOW (ref 32–36)
MCHC RBC-ENTMCNC: 32.2 % — SIGNIFICANT CHANGE UP (ref 32–36)
MCV RBC AUTO: 80 FL — SIGNIFICANT CHANGE UP (ref 80–100)
MCV RBC AUTO: 80.2 FL — SIGNIFICANT CHANGE UP (ref 80–100)
MONOCYTES # BLD AUTO: 0.66 K/UL — SIGNIFICANT CHANGE UP (ref 0–0.9)
MONOCYTES # BLD AUTO: 0.66 K/UL — SIGNIFICANT CHANGE UP (ref 0–0.9)
MONOCYTES NFR BLD AUTO: 9.2 % — SIGNIFICANT CHANGE UP (ref 2–14)
MONOCYTES NFR BLD AUTO: 9.9 % — SIGNIFICANT CHANGE UP (ref 2–14)
NEUTROPHILS # BLD AUTO: 5.28 K/UL — SIGNIFICANT CHANGE UP (ref 1.8–7.4)
NEUTROPHILS # BLD AUTO: 5.86 K/UL — SIGNIFICANT CHANGE UP (ref 1.8–7.4)
NEUTROPHILS NFR BLD AUTO: 79.5 % — HIGH (ref 43–77)
NEUTROPHILS NFR BLD AUTO: 81.5 % — HIGH (ref 43–77)
NRBC # FLD: 0 K/UL — SIGNIFICANT CHANGE UP (ref 0–0)
NRBC # FLD: 0 K/UL — SIGNIFICANT CHANGE UP (ref 0–0)
OB PNL STL: POSITIVE — SIGNIFICANT CHANGE UP
PHOSPHATE SERPL-MCNC: 4 MG/DL — SIGNIFICANT CHANGE UP (ref 2.5–4.5)
PLATELET # BLD AUTO: 231 K/UL — SIGNIFICANT CHANGE UP (ref 150–400)
PLATELET # BLD AUTO: 259 K/UL — SIGNIFICANT CHANGE UP (ref 150–400)
PMV BLD: 9.1 FL — SIGNIFICANT CHANGE UP (ref 7–13)
PMV BLD: 9.5 FL — SIGNIFICANT CHANGE UP (ref 7–13)
POTASSIUM SERPL-MCNC: 3.5 MMOL/L — SIGNIFICANT CHANGE UP (ref 3.5–5.3)
POTASSIUM SERPL-SCNC: 3.5 MMOL/L — SIGNIFICANT CHANGE UP (ref 3.5–5.3)
PROT SERPL-MCNC: 5.2 G/DL — LOW (ref 6–8.3)
RBC # BLD: 3.3 M/UL — LOW (ref 4.2–5.8)
RBC # BLD: 3.43 M/UL — LOW (ref 4.2–5.8)
RBC # FLD: 17.2 % — HIGH (ref 10.3–14.5)
RBC # FLD: 17.3 % — HIGH (ref 10.3–14.5)
RH IG SCN BLD-IMP: POSITIVE — SIGNIFICANT CHANGE UP
SODIUM SERPL-SCNC: 129 MMOL/L — LOW (ref 135–145)
WBC # BLD: 6.64 K/UL — SIGNIFICANT CHANGE UP (ref 3.8–10.5)
WBC # BLD: 7.19 K/UL — SIGNIFICANT CHANGE UP (ref 3.8–10.5)
WBC # FLD AUTO: 6.64 K/UL — SIGNIFICANT CHANGE UP (ref 3.8–10.5)
WBC # FLD AUTO: 7.19 K/UL — SIGNIFICANT CHANGE UP (ref 3.8–10.5)

## 2020-07-11 PROCEDURE — 99233 SBSQ HOSP IP/OBS HIGH 50: CPT

## 2020-07-11 PROCEDURE — 99291 CRITICAL CARE FIRST HOUR: CPT

## 2020-07-11 PROCEDURE — 99232 SBSQ HOSP IP/OBS MODERATE 35: CPT | Mod: GC

## 2020-07-11 RX ADMIN — Medication 20 MILLIGRAM(S): at 05:34

## 2020-07-11 RX ADMIN — BUMETANIDE 2 MILLIGRAM(S): 0.25 INJECTION INTRAMUSCULAR; INTRAVENOUS at 14:03

## 2020-07-11 RX ADMIN — CEFTRIAXONE 100 MILLIGRAM(S): 500 INJECTION, POWDER, FOR SOLUTION INTRAMUSCULAR; INTRAVENOUS at 11:03

## 2020-07-11 RX ADMIN — BUMETANIDE 2 MILLIGRAM(S): 0.25 INJECTION INTRAMUSCULAR; INTRAVENOUS at 22:30

## 2020-07-11 RX ADMIN — Medication 20 MILLIGRAM(S): at 14:03

## 2020-07-11 RX ADMIN — Medication 20 MILLIGRAM(S): at 22:36

## 2020-07-11 NOTE — PROGRESS NOTE ADULT - ASSESSMENT
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  Sent from clinic for IV diuresis and milrinone therapy.      #Neuro   - No acute issues    #Cardiovascular  Acute Decompensated Heart Failure   - continue diuresis with Bumex 2mg q8h and home aldactone; goal at least 1L net negative per day   - continue milrinone gtt   - continue sildenafil 20mg q12h   - Continue home digoxin   - HOLD home lisinopril per Congenital Heart team   - LHC arrangements per Peds Congenital Heart team    #Respiratory   - Patient baseline sat 88%   - Currently satting 89-95% on room air  Left Pleural Effusion   - Peds Cards would like a thoracentesis prior to cardiac cath   - May defer until Monday for cath Tuesday    #Gastrointestinal  Spontaneous bacterial peritonitis   - Peritoneal fluid analysis demonstrates PMNs 1211, gram stain with PMNs no organism seen.  Diagnostic for SBP.  Patient remains asymptomatic, no fevers, no WBC elevation, no abdominal pain   - Ceftriaxone 2g q24, 5 day course ending 7/12   - No viable fluid pocket today for repeat paracentesis  Gastritis   - Continue home protonix   - History of GI bleed   - Patient notes dark stools since restarting iron, Hgb remains stable    #Renal   - Continue diuresis with Bumex gtt and aldactone   - Baseline Cr appears to be ~0.8    #Infectious Disease  Spontaneous Bacterial Peritonitis   - as above, ceftriaxone 2g q24 ending 7/12   - No viable pocket for repeat para    #Endo   - No acute issues    #Heme  Anemia   - Has baseline anemia usually ~13.0   - Current hgb 9-11 this hospitalization; all cell lines down including plts and wbc   - Continue to get CBC q12 to monitor, and have active type and screen   - Anemia labs indicate no hemolysis, adequate folate and B12, and slight iron deficiency.  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  Sent from clinic for IV diuresis and milrinone therapy.      #Neuro   - No acute issues, AOx3    #Cardiovascular  Acute Decompensated Heart Failure   - continue diuresis with Bumex 2mg q8h and home aldactone; goal at least 1L net negative per day   - continue milrinone gtt   - continue sildenafil 20mg q12h, increased to q8h 7/11   - Continue home digoxin   - HOLD home lisinopril per Congenital Heart team   - [facility] scheduled for 7/14    #Respiratory   - Patient baseline sat 88%   - Currently satting 89-95% on room air  Left Pleural Effusion   - Peds Cards would like a thoracentesis prior to cardiac cath, will consider    #Gastrointestinal  Spontaneous bacterial peritonitis   - Peritoneal fluid analysis demonstrates PMNs 1211, gram stain with PMNs no organism seen.  Diagnostic for SBP.  Patient remains asymptomatic, no fevers, no WBC elevation, no abdominal pain   - Ceftriaxone 2g q24, 5 day course ending 7/12   - No viable fluid pocket today for repeat paracentesis  Gastritis   - Continue home protonix   - History of GI bleed   - Patient notes dark stools since restarting iron, Hgb remains stable  Congestive Hepatopathy   - IR consult to perform wedged hepatic venous pressure gradient and transjugular liver biopsy    #Renal   - Continue diuresis with Bumex gtt and aldactone   - Baseline Cr appears to be ~0.8    #Infectious Disease  Spontaneous Bacterial Peritonitis   - as above, ceftriaxone 2g q24 ending 7/12. Then switch to PO cipro 500 mg daily for secondary SBP prophylaxis.     - No viable pocket for repeat para    #Endo   - No acute issues    #Heme  Anemia   - Has baseline anemia usually ~13.0   - Current hgb 9-11 this hospitalization; all cell lines down including plts and wbc   - Continue to get CBC q12 to monitor, and have active type and screen. Transfuse to Hb>10   - Anemia labs indicate no hemolysis, adequate folate and B12, and slight iron deficiency.  Will continue iron supplementation    #  -Right hydrocele & Left varicoceles  -Per urology, increased abdominal fluid and pressure likely the etiology behind the hydrocele development, and it is likely to improve after these issues are addressed. 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  Sent from clinic for IV diuresis and milrinone therapy.      #Neuro   - No acute issues, AOx3    #Cardiovascular  Acute Decompensated Heart Failure   - continue diuresis with Bumex 2mg q8h and home aldactone; goal at least 1L net negative per day   - continue milrinone gtt   - continue sildenafil 20mg q12h, increased to q8h 7/11   - Continue home digoxin   - HOLD home lisinopril per Congenital Heart team   - [facility] scheduled for 7/14    #Respiratory   - Patient baseline sat 88%   - Currently satting 89-95% on room air  Left Pleural Effusion   - Peds Cards would like a thoracentesis prior to cardiac cath, will consider    #Gastrointestinal  Spontaneous bacterial peritonitis   - Peritoneal fluid analysis demonstrates PMNs 1211, gram stain with PMNs no organism seen.  Diagnostic for SBP.  Patient remains asymptomatic, no fevers, no WBC elevation, no abdominal pain   - Ceftriaxone 2g q24, 5 day course ending 7/12   - No viable fluid pocket today for repeat paracentesis  Gastritis   - Continue home protonix   - History of GI bleed   - Patient notes dark stools since restarting iron, Hgb remains stable  Congestive Hepatopathy   - IR consult to perform wedged hepatic venous pressure gradient and transjugular liver biopsy    #Renal   - Continue diuresis with Bumex gtt and aldactone   - Baseline Cr appears to be ~0.8    #Infectious Disease  Spontaneous Bacterial Peritonitis   - as above, ceftriaxone 2g q24 ending 7/12. Then switch to PO cipro 500 mg daily for secondary SBP prophylaxis.     - No viable pocket for repeat para    #Endo   - No acute issues    #Heme  Anemia   - Has baseline anemia usually ~13.0   - Current hgb 9-11 this hospitalization; all cell lines down including plts and wbc   - Continue to get CBC q12 to monitor, and have active type and screen. Transfuse to Hb>10   - Anemia labs indicate no hemolysis, adequate folate and B12, and slight iron deficiency.  Will continue iron supplementation    #  - Right hydrocele & Left varicoceles  - Per urology, increased abdominal fluid and pressure likely the etiology behind the hydrocele development, and it is likely to improve after these issues are addressed  - GC pending

## 2020-07-11 NOTE — PROGRESS NOTE ADULT - ATTENDING COMMENTS
30 yo M admitted with acute on chronic decompensated heart failure, currently on milrinone infusion, and Fontan-associated liver disease (FALD) with ascites with current culture-negative neutrocytic ascites with presumed SBP.     The appearance of his liver on US and CT is consistent with FALD, with nodularity and heterogeneity of the liver parenchyma due to chronic passive congestion. He has a normal platelet count, normal bilirubin level, and only mildly elevated INR on labs. He does not have any history of varices or hepatic encephalopathy. His ascitic fluid chemistries were more consistent with cardiac etiology (with high ascitic fluid protein >2.5 and low SAAG which can be seen in the context of active diuresis) than with cirrhosis. However, it is very rare to have SBP in the context of cardiac ascites alone, and this complication (in the absence of any obvious viscus source of peritonitis seen on CT) is highly suspicious for progression of his FALD to decompensated cirrhosis.    Agree with plan to pursue transjugular liver biopsy with hepatic venous pressure measurements for further evaluation, as objective data on the degree of liver fibrosis and presence of sinusoidal portal hypertension will be critical for determining whether he may be a candidate for single organ heart transplantation or combined heart-liver transplantation.    Today, he was very interested to learn more from me about the process of undergoing evaluation and wait-listing for heart or heart-liver transplantation, and we had a lengthy discussion on those topics at bedside (including with his mother listening and asking pertinent questions by telephone). They were very appreciative of the information provided. He will eventually need referral to a transplant center experienced with congenital heart disease and with the capability to offer combined heart-liver transplantation if deemed necessary, such as [facility] or [facility].    Please don't hesitate to call with any questions/concerns.    Silvia Soto M.D., Ph.D.  Transplant Hepatology  Cell: [phone]

## 2020-07-11 NOTE — PROGRESS NOTE ADULT - SUBJECTIVE AND OBJECTIVE BOX
Patient is a 29y old  Male who presents with a chief complaint of Acute decompensated heart failure (10 Jul 2020 16:51)      INTERVAL HPI/OVERNIGHT EVENTS:      REVIEW OF SYSTEMS:    CONSTITUTIONAL: No weakness, fevers or chills  EYES/ENT: No visual changes;  No vertigo or throat pain   NECK: No pain or stiffness  RESPIRATORY: No cough, wheezing, hemoptysis; No shortness of breath  CARDIOVASCULAR: No chest pain or palpitations  GASTROINTESTINAL: No abdominal or epigastric pain. No nausea, vomiting, or hematemesis; No diarrhea or constipation. No melena or hematochezia.  GENITOURINARY: No dysuria, frequency or hematuria  NEUROLOGICAL: No numbness or weakness  All other review of systems is negative unless indicated above.    FAMILY HISTORY:  Family history of neoplasm of uncertain behavior of connective and other soft tissue: mother    T(C): 36.9 (07-11-20 @ 04:00), Max: 37.2 (07-10-20 @ 18:00)  HR: 76 (07-11-20 @ 06:00) (68 - 87)  BP: 120/66 (07-11-20 @ 04:00) (94/60 - 130/60)  RR: 21 (07-11-20 @ 06:00) (15 - 33)  SpO2: 86% (07-11-20 @ 06:00) (86% - 99%)  Wt(kg): --Vital Signs Last 24 Hrs  T(C): 36.9 (11 Jul 2020 04:00), Max: 37.2 (10 Jul 2020 18:00)  T(F): 98.4 (11 Jul 2020 04:00), Max: 98.9 (10 Jul 2020 18:00)  HR: 76 (11 Jul 2020 06:00) (68 - 87)  BP: 120/66 (11 Jul 2020 04:00) (94/60 - 130/60)  BP(mean): 74 (11 Jul 2020 00:00) (68 - 93)  RR: 21 (11 Jul 2020 06:00) (15 - 33)  SpO2: 86% (11 Jul 2020 06:00) (86% - 99%)    PHYSICAL EXAM:  GENERAL: NAD, well-groomed, well-developed  HEAD:  Atraumatic, Normocephalic  EYES: EOMI, PERRLA, conjunctiva and sclera clear  ENMT: No tonsillar erythema, exudates, or enlargement; Moist mucous membranes, Good dentition, No lesions  NECK: Supple, No JVD, Normal thyroid  NERVOUS SYSTEM:  Alert & Oriented X3, Good concentration; Motor Strength 5/5 B/L upper and lower extremities; DTRs 2+ intact and symmetric  CHEST/LUNG: Clear to percussion bilaterally; No rales, rhonchi, wheezing, or rubs  HEART: Regular rate and rhythm; No murmurs, rubs, or gallops  ABDOMEN: Soft, Nontender, Nondistended; Bowel sounds present  EXTREMITIES:  2+ Peripheral Pulses, No clubbing, cyanosis, or edema  LYMPH: No lymphadenopathy noted  SKIN: No rashes or lesions    Consultant(s) Notes Reviewed:  [x ] YES  [ ] NO  Care Discussed with Consultants/Other Providers [ x] YES  [ ] NO    LABS:                        8.7    6.64  )-----------( 259      ( 11 Jul 2020 05:30 )             27.5     11 Jul 2020 05:30    129    |  93     |  20     ----------------------------<  115    3.5     |  27     |  0.75     Ca    8.4        11 Jul 2020 05:30  Phos  4.0       11 Jul 2020 05:30  Mg     2.0       11 Jul 2020 05:30    TPro  5.2    /  Alb  2.7    /  TBili  0.2    /  DBili  x      /  AST  13     /  ALT  12     /  AlkPhos  135    11 Jul 2020 05:30      CAPILLARY BLOOD GLUCOSE        BLOOD CULTURE  07-07 @ 18:20   No growth  --  --  07-07 @ 12:35   No growth  --  --      RADIOLOGY & ADDITIONAL TESTS:    Imaging Personally Reviewed:  [ ] YES  [ ] NO  aluminum hydroxide/magnesium hydroxide/simethicone Suspension 30 milliLiter(s) Oral every 6 hours PRN  buMETAnide Injectable 2 milliGRAM(s) IV Push every 8 hours  cefTRIAXone   IVPB      cefTRIAXone   IVPB 2000 milliGRAM(s) IV Intermittent every 24 hours  chlorhexidine 4% Liquid 1 Application(s) Topical <User Schedule>  digoxin     Tablet 0.125 milliGRAM(s) Oral daily  ferrous    sulfate 325 milliGRAM(s) Oral daily  heparin   Injectable 5000 Unit(s) SubCutaneous every 8 hours  melatonin 3 milliGRAM(s) Oral at bedtime  milrinone Infusion 0.125 MICROgram(s)/kG/Min IV Continuous <Continuous>  sildenafil (REVATIO) 20 milliGRAM(s) Oral every 12 hours  spironolactone 25 milliGRAM(s) Oral daily      HEALTH ISSUES - PROBLEM Dx: Patient is a 29y old  Male who presents with a chief complaint of Acute decompensated heart failure (10 Jul 2020 16:51)      INTERVAL HPI/OVERNIGHT EVENTS: KAMAR. Pt endorses abd swelling.       REVIEW OF SYSTEMS:    CONSTITUTIONAL: No weakness, fevers or chills  EYES/ENT: No visual changes;  No vertigo or throat pain   NECK: No pain or stiffness  RESPIRATORY: No cough, wheezing, hemoptysis; No shortness of breath  CARDIOVASCULAR: No chest pain or palpitations  GASTROINTESTINAL: No abdominal or epigastric pain. No nausea, vomiting, or hematemesis; No diarrhea or constipation. No melena or hematochezia.  GENITOURINARY: No dysuria, frequency or hematuria  NEUROLOGICAL: No numbness or weakness  All other review of systems is negative unless indicated above.    FAMILY HISTORY:  Family history of neoplasm of uncertain behavior of connective and other soft tissue: mother    T(C): 36.9 (07-11-20 @ 04:00), Max: 37.2 (07-10-20 @ 18:00)  HR: 76 (07-11-20 @ 06:00) (68 - 87)  BP: 120/66 (07-11-20 @ 04:00) (94/60 - 130/60)  RR: 21 (07-11-20 @ 06:00) (15 - 33)  SpO2: 86% (07-11-20 @ 06:00) (86% - 99%)  Wt(kg): --Vital Signs Last 24 Hrs  T(C): 36.9 (11 Jul 2020 04:00), Max: 37.2 (10 Jul 2020 18:00)  T(F): 98.4 (11 Jul 2020 04:00), Max: 98.9 (10 Jul 2020 18:00)  HR: 76 (11 Jul 2020 06:00) (68 - 87)  BP: 120/66 (11 Jul 2020 04:00) (94/60 - 130/60)  BP(mean): 74 (11 Jul 2020 00:00) (68 - 93)  RR: 21 (11 Jul 2020 06:00) (15 - 33)  SpO2: 86% (11 Jul 2020 06:00) (86% - 99%)    PHYSICAL EXAM:  GENERAL: NAD, well-groomed, well-developed  HEAD:  Atraumatic, Normocephalic  EYES: EOMI, PERRLA, conjunctiva and sclera clear  ENMT: No tonsillar erythema, exudates, or enlargement; Moist mucous membranes, Good dentition, No lesions  NECK: Supple, No JVD, Normal thyroid  NERVOUS SYSTEM:  Alert & Oriented X3, Good concentration; Motor Strength 5/5 B/L upper and lower extremities; DTRs 2+ intact and symmetric  CHEST/LUNG: Clear to percussion bilaterally; No rales, rhonchi, wheezing, or rubs  HEART: Regular rate and rhythm; No murmurs, rubs, or gallops  ABDOMEN: Soft, Nontender, Nondistended; Bowel sounds present  EXTREMITIES:  2+ Peripheral Pulses, No clubbing, cyanosis, or edema  LYMPH: No lymphadenopathy noted  SKIN: No rashes or lesions    Consultant(s) Notes Reviewed:  [x ] YES  [ ] NO  Care Discussed with Consultants/Other Providers [ x] YES  [ ] NO    LABS:                        8.7    6.64  )-----------( 259      ( 11 Jul 2020 05:30 )             27.5     11 Jul 2020 05:30    129    |  93     |  20     ----------------------------<  115    3.5     |  27     |  0.75     Ca    8.4        11 Jul 2020 05:30  Phos  4.0       11 Jul 2020 05:30  Mg     2.0       11 Jul 2020 05:30    TPro  5.2    /  Alb  2.7    /  TBili  0.2    /  DBili  x      /  AST  13     /  ALT  12     /  AlkPhos  135    11 Jul 2020 05:30      CAPILLARY BLOOD GLUCOSE        BLOOD CULTURE  07-07 @ 18:20   No growth  --  --  07-07 @ 12:35   No growth  --  --      RADIOLOGY & ADDITIONAL TESTS:    Imaging Personally Reviewed:  [ ] YES  [ ] NO  aluminum hydroxide/magnesium hydroxide/simethicone Suspension 30 milliLiter(s) Oral every 6 hours PRN  buMETAnide Injectable 2 milliGRAM(s) IV Push every 8 hours  cefTRIAXone   IVPB      cefTRIAXone   IVPB 2000 milliGRAM(s) IV Intermittent every 24 hours  chlorhexidine 4% Liquid 1 Application(s) Topical <User Schedule>  digoxin     Tablet 0.125 milliGRAM(s) Oral daily  ferrous    sulfate 325 milliGRAM(s) Oral daily  heparin   Injectable 5000 Unit(s) SubCutaneous every 8 hours  melatonin 3 milliGRAM(s) Oral at bedtime  milrinone Infusion 0.125 MICROgram(s)/kG/Min IV Continuous <Continuous>  sildenafil (REVATIO) 20 milliGRAM(s) Oral every 12 hours  spironolactone 25 milliGRAM(s) Oral daily      HEALTH ISSUES - PROBLEM Dx:

## 2020-07-11 NOTE — PROGRESS NOTE PEDS - ASSESSMENT
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    #Fontan failure. Multifactorial- pump dysfunction, significant AVV regurgitation, presumed high venous pressures. Appears closer to euvolemia than on admission.  Limited medical therapy options. Anticipate heading towards transplantation  - continue milrinone for now  - if SBP cleared, anticipate cath for Tuesday; MICU team to evaluate if tappable fluid pocket daily  - evaluate for tapping left lung effusion- a sizeable effusion will impair passive, respiratory variation driven Fontan blood flow  - continue digoxin  - continue Bumex 2 mg IV q8h; if seeing bump in Cr, consider stepping back to q12h  - Keep K > 4, Mg> 2; can give KCl to drive up chloride as well  - Increase sildenafil to 20 mg PO three times daily  - transfuse for Hgb > 10 given poor systolic function and likely to get anesthesia over the next few days; given his degree of desaturation, I would expect his baseline Hgb to be at least 14-15, probably higher; he has had prior history of GI bleeding for which intervention was done    #Fontan associated liver dysfunction. Ascites fluid c/w SBP. On abx  - as above, evaluate for repeat diagnostic paracentesis to demonstrate improving cell count on treatment  - d/w IR the possibility of transjugular liver biopsy at the time of cath on Tuesday    Arie has been reluctant for interventions/regular follow up in the past. This admission really does seem to have made a change in his outlook and motivation to get better. He asked direct questions this morning about transplantation. I had a kenneth discussion with him. Once we have cath data, will begin referral process to congenital transplant center.

## 2020-07-11 NOTE — PROGRESS NOTE ADULT - ASSESSMENT
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    Currently admitted with decompensated acute on chronic Fontan failure., GIB (1/2020) presenting w/ acute on chronic ADHF and worsening ascites, hepatology consulted for ascites management, pt now s/p LVP w/ SBP.     Etiology of patient's worsening ascites likely from congestive hepatopathy and subsequent cirrhosis. However difficult to determine whether ascites is entirely attributed to heart failure and congestive hepatopathy, or whether prolonged liver damage and cirrhosis have led to independent liver fibrosis/cirrhosis.     Additionally, patient's ascites fluid was positive for SBP by cell count. Culture is negative  SAAG is less than 1.1 ( high ascitic protein )   His prior GIB (2/2020) which demonstrated duodenitis and was treated with APC likely does not explain the current SBP.   CT A/P does not show any bowel inflammation.     impression:  # SBP  # Elevated liver enzymes likely due to congestive hepatopathy   # Cardiogenic shock on Milrinone   # Acute on chronic combined systolic and diastolic congestive heart failure.   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      Recommendations:  - Continue IV Ceftriaxone 2 g daily for 5 days. Then switch to PO cipro 500 mg daily for secondary SBP prophylaxis.    - Suggest abdominal ultrasound exam to assess if sufficient ascites to allow paracentesis to assure adequate response to antibiotics.    - Continue IV Bumex as per cardiology   - Patient is being scheduled for cardiac cath on next Tuesday.  Suggest IR consult to perform wedged hepatic venous pressure gradient and transjugular liver biopsy, either at time of cardiac cath.  This information will help to inform plans for future consideration of heart transplant or other interventions.        Xavi Gusman   Gastroenterology Fellow  Pager: [phone]  Please page GI (Pager: [phone]) if there are any additional questions or concerns.   Please call on-call GI fellow after 5pm and before 8am, and on weekends. 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    Currently admitted with decompensated acute on chronic Fontan failure., GIB (1/2020) presenting w/ acute on chronic ADHF and worsening ascites, hepatology consulted for ascites management, pt now s/p LVP w/ SBP.     Etiology of patient's worsening ascites likely from congestive hepatopathy and subsequent cirrhosis. However difficult to determine whether ascites is entirely attributed to heart failure and congestive hepatopathy, or whether prolonged liver damage and cirrhosis have led to independent liver fibrosis/cirrhosis.     Additionally, patient's ascites fluid was positive for SBP by cell count. Culture is negative  SAAG is less than 1.1 ( high ascitic protein )   His prior GIB (2/2020) which demonstrated duodenitis and was treated with APC likely does not explain the current SBP.   CT A/P does not show any bowel inflammation.     impression:  # SBP  # Elevated liver enzymes likely due to congestive hepatopathy / cirrhosis   # Cardiogenic shock on Milrinone   # Acute on chronic combined systolic and diastolic congestive heart failure.   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      Recommendations:  - Continue IV Ceftriaxone 2 g daily for 5 days. Then switch to PO cipro 500 mg daily for secondary SBP prophylaxis.    - Suggest abdominal ultrasound exam to assess if sufficient ascites to allow paracentesis to assure adequate response to antibiotics.    - Continue IV Bumex as per cardiology   - Patient is being scheduled for cardiac cath on next Tuesday.  Suggest IR consult to perform wedged hepatic venous pressure gradient and transjugular liver biopsy at time of cardiac cath.    This information will help to inform plans for future consideration of heart transplant and possible liver transplant as well.        Xavi Gusman   Gastroenterology Fellow  Pager: [phone]  Please page GI (Pager: [phone]) if there are any additional questions or concerns.   Please call on-call GI fellow after 5pm and before 8am, and on weekends.

## 2020-07-11 NOTE — PROGRESS NOTE PEDS - SUBJECTIVE AND OBJECTIVE BOX
Adult Congenital Heart Disease Consult  Progress Note    S:  Feels abdomen much improved from admission.  No CP/SOB/VOSS.  Stools still dark.    O:  ICU Vital Signs Last 24 Hrs  T(C): 36.9 (2020 04:00), Max: 37.2 (10 Jul 2020 18:00)  T(F): 98.4 (2020 04:00), Max: 98.9 (10 Jul 2020 18:00)  HR: 76 (2020 06:00) (68 - 87)  BP: 120/66 (2020 04:00) (94/60 - 130/60)  BP(mean): 74 (2020 00:00) (68 - 93)  ABP: --  ABP(mean): --  RR: 21 (:00) (15 - 33)  SpO2: 86% (2020 06:00) (86% - 99%)    Daily     Daily Weight in k.6 (2020 06:00)  net - 740.5 for 24 hours    seated in bed, no distress  mmm  s1 single s2, 2/6 HSM at LLSB to apex  diminished on LLL  abdomen soft  chronic venous stasis changes  pulses equal and symmetric  blunting to fingertips    tele: sinus rhythm, wenkebach                        8.7    6.64  )-----------( 259      ( 2020 05:30 )             27.5     07-11    129<L>  |  93<L>  |  20  ----------------------------<  115<H>  3.5   |  27  |  0.75    Ca    8.4      2020 05:30  Phos  4.0     07-11  Mg     2.0     07-11    TPro  5.2<L>  /  Alb  2.7<L>  /  TBili  0.2  /  DBili  x   /  AST  13  /  ALT  12  /  AlkPhos  135<H>  07-11    MEDICATIONS  (STANDING):  buMETAnide Injectable 2 milliGRAM(s) IV Push every 8 hours  cefTRIAXone   IVPB      cefTRIAXone   IVPB 2000 milliGRAM(s) IV Intermittent every 24 hours  chlorhexidine 4% Liquid 1 Application(s) Topical <User Schedule>  digoxin     Tablet 0.125 milliGRAM(s) Oral daily  ferrous    sulfate 325 milliGRAM(s) Oral daily  heparin   Injectable 5000 Unit(s) SubCutaneous every 8 hours  melatonin 3 milliGRAM(s) Oral at bedtime  milrinone Infusion 0.125 MICROgram(s)/kG/Min (2.6 mL/Hr) IV Continuous <Continuous>  sildenafil (REVATIO) 20 milliGRAM(s) Oral every 12 hours  spironolactone 25 milliGRAM(s) Oral daily    MEDICATIONS  (PRN):  aluminum hydroxide/magnesium hydroxide/simethicone Suspension 30 milliLiter(s) Oral every 6 hours PRN Dyspepsia

## 2020-07-11 NOTE — PROGRESS NOTE ADULT - SUBJECTIVE AND OBJECTIVE BOX
Interval Events:   Right flank pain  No nausea /vomiting / diarrhea   No melena / bloody bm     Hospital Medications:  aluminum hydroxide/magnesium hydroxide/simethicone Suspension 30 milliLiter(s) Oral every 6 hours PRN  buMETAnide Injectable 2 milliGRAM(s) IV Push every 8 hours  cefTRIAXone   IVPB      cefTRIAXone   IVPB 2000 milliGRAM(s) IV Intermittent every 24 hours  chlorhexidine 4% Liquid 1 Application(s) Topical <User Schedule>  digoxin     Tablet 0.125 milliGRAM(s) Oral daily  ferrous    sulfate 325 milliGRAM(s) Oral daily  heparin   Injectable 5000 Unit(s) SubCutaneous every 8 hours  melatonin 3 milliGRAM(s) Oral at bedtime  milrinone Infusion 0.125 MICROgram(s)/kG/Min IV Continuous <Continuous>  sildenafil (REVATIO) 20 milliGRAM(s) Oral every 12 hours  spironolactone 25 milliGRAM(s) Oral daily        ROS:   General:  No fevers, chills or night sweats  ENT:  No sore throat or dysphagia  CV:  No pain or palpitations  Resp:  No dyspnea, cough or  wheezing  GI:  as above  Skin:  No rash or edema  Neuro: no weakness   Hematologic: no bleeding  Musculoskeletal: no muscle pain or join pain  Psych: no agitation      PHYSICAL EXAM:   Vital Signs:  Vital Signs Last 24 Hrs  T(C): 36.9 (2020 08:00), Max: 37.2 (10 Jul 2020 18:00)  T(F): 98.5 (2020 08:00), Max: 98.9 (10 Jul 2020 18:00)  HR: 81 (2020 08:00) (68 - 87)  BP: 101/51 (2020 08:00) (94/60 - 130/60)  BP(mean): 63 (2020 08:00) (63 - 75)  RR: 19 (2020 08:00) (15 - 33)  SpO2: 91% (2020 08:00) (86% - 98%)  Daily     Daily Weight in k.6 (2020 06:00)    GENERAL:  NAD, Appears stated age  HEENT:  NC/AT,  conjunctivae clear and pink, sclera -anicteric  CHEST:  Normal Effort, Breath sounds clear  HEART:  RRR, S1 + S2,   ABDOMEN:  Soft, non-tender, mildly -distended, normoactive bowel sounds,  no masses  EXTREMITIES: + edema  SKIN:  Warm & Dry. No rash or erythema  NEURO:  Alert, oriented    LABS:                        8.7    6.64  )-----------( 259      ( 2020 05:30 )             27.5     Mean Cell Volume: 80.2 fL (20 @ 05:30)        129<L>  |  93<L>  |  20  ----------------------------<  115<H>  3.5   |  27  |  0.75    Ca    8.4      2020 05:30  Phos  4.0       Mg     2.0         TPro  5.2<L>  /  Alb  2.7<L>  /  TBili  0.2  /  DBili  x   /  AST  13  /  ALT  12  /  AlkPhos  135<H>      LIVER FUNCTIONS - ( 2020 05:30 )  Alb: 2.7 g/dL / Pro: 5.2 g/dL / ALK PHOS: 135 u/L / ALT: 12 u/L / AST: 13 u/L / GGT: x                                       8.7    6.64  )-----------( 259      ( 2020 05:30 )             27.5                         9.4    8.26  )-----------( 314      ( 10 Jul 2020 20:15 )             30.1                         10.0   6.84  )-----------( 284      ( 10 Jul 2020 06:40 )             30.2                         10.4   8.05  )-----------( 315      ( 2020 18:45 )             31.4                         9.7    7.32  )-----------( 262      ( 2020 06:22 )             29.2       Imaging:

## 2020-07-11 NOTE — PROGRESS NOTE ADULT - ATTENDING COMMENTS
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  Currently admitted with decompensated acute on chronic Fontan failure, on milrinone and bumex. +SBP  Appreciate peds cardiology, hepatology following. Pt to get workup toward transplant candidacy - LHC and liver biopsy tuesday.   Tolerating sildenafil   Monitor left pleural effusion, size lessening with diuresis on bedside US. .   F/u Hgb and per cards, transfuse if Hgb < 10. 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  Currently admitted with decompensated acute on chronic Fontan failure, on milrinone and bumex. +SBP on ceftriaxone. Appreciate peds cardiology, hepatology following. Pt to get workup toward transplant candidacy - LHC and liver biopsy tuesday.   Tolerating sildenafil   Monitor left pleural effusion, size lessening with diuresis on bedside US. .   F/u Hgb and per cards, transfuse if Hgb < 10.

## 2020-07-11 NOTE — CHART NOTE - NSCHARTNOTEFT_GEN_A_CORE
Vascular & Interventional Radiology Brief Consult Note    Evaluate for Procedure: Transjugular liver biopsy with pressure measurements.     HPI: 29y Male with history of hypoplastic left heart presents with acute on chronic heart failure secondary to failed Fontan palliation. Recurrent ascites of unclear etiology, heart failure vs cirrhosis from chronic heart failure. Initial consult requested for Tuesday as patient is going to be sedated for cardiac cath.     Allergies:   Medications (Abx/Cardiac/Anticoagulation/Blood Products)    buMETAnide Injectable: 2 milliGRAM(s) IV Push (07-11 @ 14:03)  cefTRIAXone   IVPB: 100 mL/Hr IV Intermittent (07-11 @ 11:03)  digoxin     Tablet: 0.125 milliGRAM(s) Oral (07-11 @ 05:33)  heparin   Injectable: 5000 Unit(s) SubCutaneous (07-11 @ 14:03)  sildenafil (REVATIO): 20 milliGRAM(s) Oral (07-11 @ 14:03)  sildenafil (REVATIO): 20 milliGRAM(s) Oral (07-11 @ 05:34)  spironolactone: 25 milliGRAM(s) Oral (07-11 @ 05:34)    Data:    T(C): 37.5  HR: 81  BP: 81/55  RR: 22  SpO2: 93%    -WBC 7.19 / HgB 8.5 / Hct 26.4 / Plt 231  -Na 129 / Cl 93 / BUN 20 / Glucose 115  -K 3.5 / CO2 27 / Cr 0.75  -ALT 12 / Alk Phos 135 / T.Bili 0.2  -INR1.36    Imaging: CT chest 2019.     Assessment/Plan:   -29y Male with acute on chronic heart failure with worsening ascites. Transjugular liver biopsy with wedge pressures requested to evaluate cirrhosis as etiology of ascites. Results of which would factor into transplant evaluation (heart/liver). Can tentatively for Tuesday. Case to be further reviewed this weekend and on Monday as anatomy is altered secondary to fontan procedure. Final location and date of procedure to be determined. Vascular & Interventional Radiology Brief Consult Note    Evaluate for Procedure: Transjugular liver biopsy with pressure measurements.     HPI: 29y Male with history of hypoplastic left heart presents with acute on chronic heart failure secondary to failed Fontan palliation. Recurrent ascites of unclear etiology, heart failure vs cirrhosis from chronic heart failure. Initial consult requested for Tuesday as patient is going to be sedated for cardiac cath.     Allergies:   Medications (Abx/Cardiac/Anticoagulation/Blood Products)    buMETAnide Injectable: 2 milliGRAM(s) IV Push (07-11 @ 14:03)  cefTRIAXone   IVPB: 100 mL/Hr IV Intermittent (07-11 @ 11:03)  digoxin     Tablet: 0.125 milliGRAM(s) Oral (07-11 @ 05:33)  heparin   Injectable: 5000 Unit(s) SubCutaneous (07-11 @ 14:03)  sildenafil (REVATIO): 20 milliGRAM(s) Oral (07-11 @ 14:03)  sildenafil (REVATIO): 20 milliGRAM(s) Oral (07-11 @ 05:34)  spironolactone: 25 milliGRAM(s) Oral (07-11 @ 05:34)    Data:    T(C): 37.5  HR: 81  BP: 81/55  RR: 22  SpO2: 93%    -WBC 7.19 / HgB 8.5 / Hct 26.4 / Plt 231  -Na 129 / Cl 93 / BUN 20 / Glucose 115  -K 3.5 / CO2 27 / Cr 0.75  -ALT 12 / Alk Phos 135 / T.Bili 0.2  -INR1.36    Imaging: CT chest 2019.     Assessment/Plan:   -29y Male with acute on chronic heart failure with worsening ascites. Transjugular liver biopsy with wedge pressures requested to evaluate cirrhosis as etiology of ascites. Results of which would factor into transplant evaluation (heart/liver). Can tentatively for Tuesday.          Attending Attestation  Case reviewed with Hepatology. Percutaneous biopsy can be performed, but transjugular approach requires navigating through post-surgical anatomy. Feasible but considerable risk. IR on standby

## 2020-07-12 LAB
ALBUMIN SERPL ELPH-MCNC: 2.8 G/DL — LOW (ref 3.3–5)
ALP SERPL-CCNC: 159 U/L — HIGH (ref 40–120)
ALT FLD-CCNC: 17 U/L — SIGNIFICANT CHANGE UP (ref 4–41)
ANION GAP SERPL CALC-SCNC: 10 MMO/L — SIGNIFICANT CHANGE UP (ref 7–14)
ANION GAP SERPL CALC-SCNC: 9 MMO/L — SIGNIFICANT CHANGE UP (ref 7–14)
AST SERPL-CCNC: 19 U/L — SIGNIFICANT CHANGE UP (ref 4–40)
BASOPHILS # BLD AUTO: 0.07 K/UL — SIGNIFICANT CHANGE UP (ref 0–0.2)
BASOPHILS # BLD AUTO: 0.08 K/UL — SIGNIFICANT CHANGE UP (ref 0–0.2)
BASOPHILS NFR BLD AUTO: 0.8 % — SIGNIFICANT CHANGE UP (ref 0–2)
BASOPHILS NFR BLD AUTO: 1 % — SIGNIFICANT CHANGE UP (ref 0–2)
BILIRUB SERPL-MCNC: 0.6 MG/DL — SIGNIFICANT CHANGE UP (ref 0.2–1.2)
BUN SERPL-MCNC: 20 MG/DL — SIGNIFICANT CHANGE UP (ref 7–23)
BUN SERPL-MCNC: 22 MG/DL — SIGNIFICANT CHANGE UP (ref 7–23)
CALCIUM SERPL-MCNC: 8.2 MG/DL — LOW (ref 8.4–10.5)
CALCIUM SERPL-MCNC: 8.5 MG/DL — SIGNIFICANT CHANGE UP (ref 8.4–10.5)
CHLORIDE SERPL-SCNC: 93 MMOL/L — LOW (ref 98–107)
CHLORIDE SERPL-SCNC: 94 MMOL/L — LOW (ref 98–107)
CO2 SERPL-SCNC: 24 MMOL/L — SIGNIFICANT CHANGE UP (ref 22–31)
CO2 SERPL-SCNC: 29 MMOL/L — SIGNIFICANT CHANGE UP (ref 22–31)
CREAT SERPL-MCNC: 0.75 MG/DL — SIGNIFICANT CHANGE UP (ref 0.5–1.3)
CREAT SERPL-MCNC: 0.92 MG/DL — SIGNIFICANT CHANGE UP (ref 0.5–1.3)
CULTURE RESULTS: SIGNIFICANT CHANGE UP
EOSINOPHIL # BLD AUTO: 0.11 K/UL — SIGNIFICANT CHANGE UP (ref 0–0.5)
EOSINOPHIL # BLD AUTO: 0.12 K/UL — SIGNIFICANT CHANGE UP (ref 0–0.5)
EOSINOPHIL NFR BLD AUTO: 1.3 % — SIGNIFICANT CHANGE UP (ref 0–6)
EOSINOPHIL NFR BLD AUTO: 1.4 % — SIGNIFICANT CHANGE UP (ref 0–6)
GLUCOSE SERPL-MCNC: 138 MG/DL — HIGH (ref 70–99)
GLUCOSE SERPL-MCNC: 98 MG/DL — SIGNIFICANT CHANGE UP (ref 70–99)
HCT VFR BLD CALC: 32.5 % — LOW (ref 39–50)
HCT VFR BLD CALC: 34.5 % — LOW (ref 39–50)
HGB BLD-MCNC: 10.9 G/DL — LOW (ref 13–17)
HGB BLD-MCNC: 10.9 G/DL — LOW (ref 13–17)
IMM GRANULOCYTES NFR BLD AUTO: 0.9 % — SIGNIFICANT CHANGE UP (ref 0–1.5)
IMM GRANULOCYTES NFR BLD AUTO: 1.3 % — SIGNIFICANT CHANGE UP (ref 0–1.5)
LYMPHOCYTES # BLD AUTO: 0.46 K/UL — LOW (ref 1–3.3)
LYMPHOCYTES # BLD AUTO: 0.59 K/UL — LOW (ref 1–3.3)
LYMPHOCYTES # BLD AUTO: 5.4 % — LOW (ref 13–44)
LYMPHOCYTES # BLD AUTO: 7 % — LOW (ref 13–44)
MAGNESIUM SERPL-MCNC: 1.9 MG/DL — SIGNIFICANT CHANGE UP (ref 1.6–2.6)
MAGNESIUM SERPL-MCNC: 1.9 MG/DL — SIGNIFICANT CHANGE UP (ref 1.6–2.6)
MCHC RBC-ENTMCNC: 26.1 PG — LOW (ref 27–34)
MCHC RBC-ENTMCNC: 27.8 PG — SIGNIFICANT CHANGE UP (ref 27–34)
MCHC RBC-ENTMCNC: 31.6 % — LOW (ref 32–36)
MCHC RBC-ENTMCNC: 33.5 % — SIGNIFICANT CHANGE UP (ref 32–36)
MCV RBC AUTO: 82.7 FL — SIGNIFICANT CHANGE UP (ref 80–100)
MCV RBC AUTO: 82.9 FL — SIGNIFICANT CHANGE UP (ref 80–100)
MONOCYTES # BLD AUTO: 0.73 K/UL — SIGNIFICANT CHANGE UP (ref 0–0.9)
MONOCYTES # BLD AUTO: 0.81 K/UL — SIGNIFICANT CHANGE UP (ref 0–0.9)
MONOCYTES NFR BLD AUTO: 8.6 % — SIGNIFICANT CHANGE UP (ref 2–14)
MONOCYTES NFR BLD AUTO: 9.7 % — SIGNIFICANT CHANGE UP (ref 2–14)
NEUTROPHILS # BLD AUTO: 6.67 K/UL — SIGNIFICANT CHANGE UP (ref 1.8–7.4)
NEUTROPHILS # BLD AUTO: 7.08 K/UL — SIGNIFICANT CHANGE UP (ref 1.8–7.4)
NEUTROPHILS NFR BLD AUTO: 79.6 % — HIGH (ref 43–77)
NEUTROPHILS NFR BLD AUTO: 83 % — HIGH (ref 43–77)
NRBC # FLD: 0 K/UL — SIGNIFICANT CHANGE UP (ref 0–0)
NRBC # FLD: 0.04 K/UL — SIGNIFICANT CHANGE UP (ref 0–0)
PHOSPHATE SERPL-MCNC: 3.9 MG/DL — SIGNIFICANT CHANGE UP (ref 2.5–4.5)
PHOSPHATE SERPL-MCNC: 4.1 MG/DL — SIGNIFICANT CHANGE UP (ref 2.5–4.5)
PLATELET # BLD AUTO: 288 K/UL — SIGNIFICANT CHANGE UP (ref 150–400)
PLATELET # BLD AUTO: 292 K/UL — SIGNIFICANT CHANGE UP (ref 150–400)
PMV BLD: 9.1 FL — SIGNIFICANT CHANGE UP (ref 7–13)
PMV BLD: 9.8 FL — SIGNIFICANT CHANGE UP (ref 7–13)
POTASSIUM SERPL-MCNC: 3.8 MMOL/L — SIGNIFICANT CHANGE UP (ref 3.5–5.3)
POTASSIUM SERPL-MCNC: 4.2 MMOL/L — SIGNIFICANT CHANGE UP (ref 3.5–5.3)
POTASSIUM SERPL-SCNC: 3.8 MMOL/L — SIGNIFICANT CHANGE UP (ref 3.5–5.3)
POTASSIUM SERPL-SCNC: 4.2 MMOL/L — SIGNIFICANT CHANGE UP (ref 3.5–5.3)
PROT SERPL-MCNC: 5.6 G/DL — LOW (ref 6–8.3)
RBC # BLD: 3.92 M/UL — LOW (ref 4.2–5.8)
RBC # BLD: 4.17 M/UL — LOW (ref 4.2–5.8)
RBC # FLD: 17.2 % — HIGH (ref 10.3–14.5)
RBC # FLD: 17.2 % — HIGH (ref 10.3–14.5)
SODIUM SERPL-SCNC: 127 MMOL/L — LOW (ref 135–145)
SODIUM SERPL-SCNC: 132 MMOL/L — LOW (ref 135–145)
SPECIMEN SOURCE: SIGNIFICANT CHANGE UP
WBC # BLD: 8.38 K/UL — SIGNIFICANT CHANGE UP (ref 3.8–10.5)
WBC # BLD: 8.53 K/UL — SIGNIFICANT CHANGE UP (ref 3.8–10.5)
WBC # FLD AUTO: 8.38 K/UL — SIGNIFICANT CHANGE UP (ref 3.8–10.5)
WBC # FLD AUTO: 8.53 K/UL — SIGNIFICANT CHANGE UP (ref 3.8–10.5)

## 2020-07-12 PROCEDURE — 99291 CRITICAL CARE FIRST HOUR: CPT

## 2020-07-12 PROCEDURE — 99233 SBSQ HOSP IP/OBS HIGH 50: CPT

## 2020-07-12 RX ORDER — ALPRAZOLAM 0.25 MG
0.5 TABLET ORAL ONCE
Refills: 0 | Status: DISCONTINUED | OUTPATIENT
Start: 2020-07-12 | End: 2020-07-12

## 2020-07-12 RX ORDER — PANTOPRAZOLE SODIUM 20 MG/1
40 TABLET, DELAYED RELEASE ORAL DAILY
Refills: 0 | Status: DISCONTINUED | OUTPATIENT
Start: 2020-07-12 | End: 2020-07-15

## 2020-07-12 RX ADMIN — PANTOPRAZOLE SODIUM 40 MILLIGRAM(S): 20 TABLET, DELAYED RELEASE ORAL at 21:33

## 2020-07-12 RX ADMIN — Medication 0.5 MILLIGRAM(S): at 19:38

## 2020-07-12 NOTE — PROGRESS NOTE ADULT - ASSESSMENT
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  Now admitted for acute on chronic decompensated heart failure and worsening ascites in the setting of Fontan associated liver disease.      #Failing Fontan- multifactorial: pump dysfunction, AV valve regurgitation, high pressures. Diuresing well to date.  - continue current meds including IV bumex and milrinone.   - plan for diagnostic cath on Tuesday (as per prior discussions).  If there is concern for active GI bleeding, will need to discuss timing  - continue sildenafil 20 mg TID  - accept saturations > 85% on room air  - ongoing discussion and eventual referral to congenital transplant center (heart +/- liver)    #Anemia. Slow drift downwards, s/p 2 units pRBCs. FOBT positive on iron.  - d/w GI thoughts about how to best evaluate bleeding given prior history/interventions  - trend H/H  - goal Hgb > 10 prior to procedure  - would expect Hgb at least 15 given his baseline cyanosis    #Fontan associated liver disease.  - appreciate hepatology input and IR input  - transjugular biopsy would be the best route given concerns for SBP; in this anatomy: the SVC --> extracardiac Fontan baffle --> IVC --> hepatics. 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  Now admitted for acute on chronic decompensated heart failure and worsening ascites in the setting of Fontan associated liver disease.      #Failing Fontan- multifactorial: pump dysfunction, AV valve regurgitation, high pressures. Diuresing well to date.  - continue current meds including IV bumex and milrinone.   - plan for diagnostic cath on Tuesday (as per prior discussions).  If there is concern for active GI bleeding, will need to discuss timing  - continue sildenafil 20 mg TID  - accept saturations > 85% on room air  - would tap left lung effusion prior to cath  - ongoing discussion and eventual referral to congenital transplant center (heart +/- liver)    #Anemia. Slow drift downwards, s/p 2 units pRBCs. FOBT positive on iron.  - d/w GI thoughts about how to best evaluate bleeding given prior history/interventions  - trend H/H  - goal Hgb > 10 prior to procedure  - would expect Hgb at least 15 given his baseline cyanosis    #Fontan associated liver disease.  - appreciate hepatology input and IR input  - transjugular biopsy would be the best route given concerns for SBP; in this anatomy: the SVC --> extracardiac Fontan baffle --> IVC --> hepatics.

## 2020-07-12 NOTE — PROGRESS NOTE ADULT - SUBJECTIVE AND OBJECTIVE BOX
CHIEF COMPLAINT: Patient is a 29y old  Male who presents with a chief complaint of Acute decompensated heart failure (12 Jul 2020 07:34)    Interval Events: No acute events overnight.  Patient has Wenckeboch rhythm on telemetry which is baseline per Peds Cards.  Endorses back/abd swelling after transfusion which is a known reaction.    REVIEW OF SYSTEMS:  CONSTITUTIONAL: No weakness, fevers or chills  EYES/ENT: No visual changes;  No vertigo or throat pain   NECK: No pain or stiffness  RESPIRATORY: No cough, wheezing, hemoptysis; No shortness of breath  CARDIOVASCULAR: No chest pain or palpitations  GASTROINTESTINAL: No abdominal or epigastric pain. No nausea, vomiting, or hematemesis; No diarrhea or constipation. No melena or hematochezia.  GENITOURINARY: No dysuria, frequency or hematuria  NEUROLOGICAL: No numbness or weakness  All other review of systems is negative unless indicated above.    OBJECTIVE:  ICU Vital Signs Last 24 Hrs  T(C): 37.1 (12 Jul 2020 04:00), Max: 37.5 (11 Jul 2020 19:30)  T(F): 98.8 (12 Jul 2020 04:00), Max: 99.5 (11 Jul 2020 19:30)  HR: 72 (12 Jul 2020 04:00) (68 - 81)  BP: 101/61 (12 Jul 2020 04:00) (81/55 - 121/37)  BP(mean): 70 (12 Jul 2020 04:00) (54 - 70)  ABP: --  ABP(mean): --  RR: 20 (12 Jul 2020 04:00) (15 - 30)  SpO2: 87% (12 Jul 2020 04:00) (87% - 93%)        07-11 @ 07:01  -  07-12 @ 07:00  --------------------------------------------------------  IN: 1312.4 mL / OUT: 2580 mL / NET: -1267.6 mL      CAPILLARY BLOOD GLUCOSE    PHYSICAL EXAM:  CONSTITUTIONAL: NAD, awake, interactive, sitting in chair eating breakfast  HEAD: Normocephalic; atraumatic  EYES: EOMI, no nystagmus, no conjunctival injection, no scleral icterus  MOUTH/THROAT:  MMM  NECK: Trachea midline, no JVD  CV: Normal S1, S2; no audible murmurs; extremities WWP  RESP: normal work of breathing; CTAB, no stridor  ABD: soft, distended but smaller than yesterday, nontender  : deferred  MSK/EXT: no edema, +prominent vasculature on legs b/l.  normal ROM in all four extremities, no deformities  SKIN: No gross rashes or lesions on exposed skin, no significant trophic changes  NEURO: aaox3, moving all extremities purposefully and spontaneously    HOSPITAL MEDICATIONS:  MEDICATIONS  (STANDING):  buMETAnide Injectable 2 milliGRAM(s) IV Push every 8 hours  cefTRIAXone   IVPB      cefTRIAXone   IVPB 2000 milliGRAM(s) IV Intermittent every 24 hours  chlorhexidine 4% Liquid 1 Application(s) Topical <User Schedule>  digoxin     Tablet 0.125 milliGRAM(s) Oral daily  ferrous    sulfate 325 milliGRAM(s) Oral daily  heparin   Injectable 5000 Unit(s) SubCutaneous every 8 hours  melatonin 3 milliGRAM(s) Oral at bedtime  milrinone Infusion 0.125 MICROgram(s)/kG/Min (2.6 mL/Hr) IV Continuous <Continuous>  sildenafil (REVATIO) 20 milliGRAM(s) Oral every 8 hours  spironolactone 25 milliGRAM(s) Oral daily    MEDICATIONS  (PRN):  aluminum hydroxide/magnesium hydroxide/simethicone Suspension 30 milliLiter(s) Oral every 6 hours PRN Dyspepsia      LABS:  (07-12 @ 00:00)                        10.9  8.38 )-----------( 292                 32.5    Neutrophils = 6.67 (79.6%)  Lymphocytes = 0.59 (7.0%)  Eosinophils = 0.12 (1.4%)  Basophils = 0.08 (1.0%)  Monocytes = 0.81 (9.7%)  Bands = --%    WBC Trend: 8.38<--, 7.19<--, 6.64<--  Hb Trend: 10.9<--, 8.5<--, 8.7<--, 9.4<--, 10.0<--  Plt Trend: 292<--, 231<--, 259<--, 314<--, 284<--  07-12    127<L>  |  94<L>  |  22  ----------------------------<  138<H>  3.8   |  24  |  0.75    Ca    8.2<L>      12 Jul 2020 00:00  Phos  3.9     07-12  Mg     1.9     07-12    TPro  5.6<L>  /  Alb  2.8<L>  /  TBili  0.6  /  DBili  x   /  AST  19  /  ALT  17  /  AlkPhos  159<H>  07-12    Creatinine Trend: 0.75<--, 0.75<--, 0.90<--, 0.91<--, 0.77<--, 0.70<--    MICROBIOLOGY:   Blood Cx: ngtd  Peritoneal cx: ngtd

## 2020-07-12 NOTE — CHART NOTE - NSCHARTNOTEFT_GEN_A_CORE
Search Terms: Arie Acevedo, 1990Search Date: 07/12/2020 19:02:03 PM  Searching on behalf of: [id number]  The Drug Utilization Report below displays all of the controlled substance prescriptions, if any, that your patient has filled in the last twelve months. The information displayed on this report is compiled from pharmacy submissions to the Department, and accurately reflects the information as submitted by the pharmacies.    This report was requested by: Sergio Briones | Reference #: [id number]    Others' Prescriptions  Patient Name: Arie AcevedoBirth Date: 1990  Address: [address] [place] [address] [address] [place], [place] [address]Sag: Male  Rx Written	Rx Dispensed	Drug	Quantity	Days Supply	Prescriber Name	Payment Method	Dispenser  03/16/2020	03/20/2020	alprazolam 0.5 mg tablet	30	30	Sara Jones	[facility]  02/13/2020	02/13/2020	alprazolam 0.5 mg tablet	30	30	Russell Aguilar (DO)	[facility]  01/28/2020	01/28/2020	zolpidem tartrate 10 mg tablet	30	30	Russell Aguilar (DO)	[facility]  * - Drugs marked with an asterisk are compound drugs. If the compound drug is made up of more than one controlled substance, then each controlled substance will be a separate row in the table.

## 2020-07-12 NOTE — PROGRESS NOTE ADULT - NSHPATTENDINGPLANDISCUSS_GEN_ALL_CORE
tameka DUDLEY, mother (by phone)
team, nurse
Cardiology and ICU team
ICU team and Dr ESSIE Hernandez
ICU team
team, nurse

## 2020-07-12 NOTE — PROGRESS NOTE ADULT - SUBJECTIVE AND OBJECTIVE BOX
Adult Congenital Heart Disease  Follow up Note    S: Reports back and pedal edema, worse at the end of the day.  Denies SOB/CP/VOSS/palpitations.  Got 2 units pRBCs. Per MICU fellow, FOBT positive.    O:  ICU Vital Signs Last 24 Hrs  T(C): 37.1 (12 Jul 2020 04:00), Max: 37.5 (11 Jul 2020 19:30)  T(F): 98.8 (12 Jul 2020 04:00), Max: 99.5 (11 Jul 2020 19:30)  HR: 72 (12 Jul 2020 04:00) (68 - 81)  BP: 101/61 (12 Jul 2020 04:00) (81/55 - 121/37)  BP(mean): 70 (12 Jul 2020 04:00) (54 - 70)  ABP: --  ABP(mean): --  RR: 20 (12 Jul 2020 04:00) (15 - 30)  SpO2: 87% (12 Jul 2020 04:00) (87% - 93%)    net negative 1.2 L    laying in bed, no distress  mmm, cyanotic  s1 single s2, 2/6 HSM at LLSB to apex  diminished on Left lower lung fields  abdomen soft, + ascites  chronic venous stasis, varicosities to legs  1+ pedal/ankle edema                          10.9   8.38  )-----------( 292      ( 12 Jul 2020 00:00 )             32.5   07-12    127<L>  |  94<L>  |  22  ----------------------------<  138<H>  3.8   |  24  |  0.75    Ca    8.2<L>      12 Jul 2020 00:00  Phos  3.9     07-12  Mg     1.9     07-12    TPro  5.6<L>  /  Alb  2.8<L>  /  TBili  0.6  /  DBili  x   /  AST  19  /  ALT  17  /  AlkPhos  159<H>  07-12    telemetry: sinus rhythm, wenkebach    MEDICATIONS  (STANDING):  buMETAnide Injectable 2 milliGRAM(s) IV Push every 8 hours  cefTRIAXone   IVPB      cefTRIAXone   IVPB 2000 milliGRAM(s) IV Intermittent every 24 hours  chlorhexidine 4% Liquid 1 Application(s) Topical <User Schedule>  digoxin     Tablet 0.125 milliGRAM(s) Oral daily  ferrous    sulfate 325 milliGRAM(s) Oral daily  heparin   Injectable 5000 Unit(s) SubCutaneous every 8 hours  melatonin 3 milliGRAM(s) Oral at bedtime  milrinone Infusion 0.125 MICROgram(s)/kG/Min (2.6 mL/Hr) IV Continuous <Continuous>  sildenafil (REVATIO) 20 milliGRAM(s) Oral every 8 hours  spironolactone 25 milliGRAM(s) Oral daily    MEDICATIONS  (PRN):  aluminum hydroxide/magnesium hydroxide/simethicone Suspension 30 milliLiter(s) Oral every 6 hours PRN Dyspepsia        EGD/Upper Endoscopy (03.03.20 @ 07:39)  --------------------------------------------------  Findings:       The examined esophagus was normal.       The Z-line was regular.       The entire examined stomach was normal.       Patches of punctate erythematous mucosa without active bleeding was        found in the duodenal bulb.      Patches of punctate erythematous mucosa without active bleeding was        found in the first part of the duodenum and in the second part of the        duodenum.       A small blood clot was seen in the second part of the duodenum along        with bilious fluid. Given proximity to the ampulla, a side-viewing        endoscope was introduced to better evaluate the region. A few diminutive        angioectasias were found just proximal to the ampulla, the presumed        source of the patient's bleeding. Coagulation for hemostasis using argon        plasma was successful. To prevent bleeding post-intervention, one        hemostatic clip was successfully placed (MR conditional). There was no        bleeding at the end of the procedure.       The ampulla was normal in appearance.

## 2020-07-12 NOTE — PROGRESS NOTE ADULT - ASSESSMENT
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  Sent from clinic for IV diuresis and milrinone therapy.      #Neuro   - No acute issues, AOx3    #Cardiovascular  Acute Decompensated Heart Failure   - continue diuresis with Bumex 2mg q8h and home aldactone; goal at least 1L net negative per day   - continue milrinone gtt   - continue sildenafil 20mg q8h   - Continue home digoxin   - HOLD home lisinopril per Congenital Heart team   - C scheduled for 7/14  Sinus Arrhythmia   - Patient has baseline Wenkeboch rhythm, telemetry consistent with this   - Will get EKG of rhythm   - Peds Cards aware    #Respiratory   - Patient baseline sat 88%   - Currently satting 89-95% on room air   - Comfortable lying flat, subjectively asymptomatic from respiratory perspective  Left Pleural Effusion   - Peds Cards would like a thoracentesis prior to cardiac cath, will consider for 7/13 to avoid reaccumulation prior to LHC    #Gastrointestinal  Spontaneous bacterial peritonitis   - Peritoneal fluid analysis demonstrates PMNs 1211, gram stain with PMNs no organism seen.  Diagnostic for SBP.  Patient remains asymptomatic, no fevers, no WBC elevation, no abdominal pain   - Ceftriaxone 2g q24, 5 day course ending 7/12   - No viable fluid pocket for repeat paracentesis   - Will require prophylaxis with cipro going forward, see below  Gastritis   - Continue home protonix   - History of GI bleed   - Patient notes dark stools since restarting iron, Hgb remains stable   - FOBT positive yesterday, with slow drop in anemia now s/p 2u pRBCs, goal hgb >10 per peds cards  Congestive Hepatopathy   - IR to perform wedged hepatic venous pressure gradient and transjugular liver biopsy, scheduled for 7/14    #Renal/   - Continue diuresis with Bumex gtt and aldactone   - Baseline Cr appears to be ~0.8  Hydrocele   - Right hydrocele & Left varicocele   - Per urology, increased abdominal fluid and pressure likely the etiology behind the hydrocele development, and it is likely to improve after these issues are addressed; nothing additional to do   - GC pending    #Infectious Disease  Spontaneous Bacterial Peritonitis   - as above, ceftriaxone 2g q24 ending 7/12. Then switch to PO cipro 500 mg daily for secondary SBP prophylaxis.     - No viable pocket for repeat para    #Endo   - No acute issues    #Heme  Anemia   - Has baseline anemia usually ~13.0   - Current hgb 9-11 this hospitalization; all cell lines down including plts and wbc   - Continue to get CBC q12 to monitor, and have active type and screen. Transfuse to Hb>10   - Anemia labs indicate no hemolysis, adequate folate and B12, and slight iron deficiency.  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  Sent from clinic for IV diuresis and milrinone therapy.      #Neuro   - No acute issues, AOx3    #Cardiovascular  Acute Decompensated Heart Failure   - continue diuresis with Bumex 2mg q8h and home aldactone; goal at least 1L net negative per day   - continue milrinone gtt   - continue sildenafil 20mg q8h   - Continue home digoxin   - HOLD home lisinopril per Congenital Heart team   - C scheduled for 7/14  Sinus Arrhythmia   - Patient has baseline Wenkeboch rhythm, telemetry consistent with this   - Will get EKG of rhythm   - Peds Cards aware    #Respiratory   - Patient baseline sat 88%   - Currently satting 89-95% on room air   - Comfortable lying flat, subjectively asymptomatic from respiratory perspective  Left Pleural Effusion   - Peds Cards would like a thoracentesis prior to cardiac cath, will consider for 7/13 to avoid reaccumulation prior to LHC    #Gastrointestinal  Spontaneous bacterial peritonitis   - Peritoneal fluid analysis demonstrates PMNs 1211, gram stain with PMNs no organism seen.  Diagnostic for SBP.  Patient remains asymptomatic, no fevers, no WBC elevation, no abdominal pain   - Ceftriaxone 2g q24, 5 day course ending 7/12   - No viable fluid pocket for repeat paracentesis   - Will require prophylaxis with cipro going forward, see below  Gastritis   - Continue home protonix   - History of GI bleed   - Patient notes dark stools since restarting iron, Hgb remains stable   - FOBT positive yesterday, with slow drop in anemia now s/p 2u pRBCs, goal hgb >10 per peds cards  Congestive Hepatopathy   - IR to perform wedged hepatic venous pressure gradient and transjugular liver biopsy, scheduled for 7/14    #Renal/   - Continue diuresis with Bumex gtt and aldactone   - Baseline Cr appears to be ~0.8  Hydrocele   - Right hydrocele & Left varicocele   - Per urology, increased abdominal fluid and pressure likely the etiology behind the hydrocele development, and it is likely to improve after these issues are addressed; nothing additional to do   - GC pending    #Infectious Disease  Spontaneous Bacterial Peritonitis   - as above, ceftriaxone 2g q24 ending 7/12. Then switch to PO cipro 500 mg daily for secondary SBP prophylaxis.     - No viable pocket for repeat para    #Endo   - No acute issues    #Heme  Anemia   - Has baseline anemia usually ~13.0   - Current hgb 9-11 this hospitalization; all cell lines down including plts and wbc   - Continue to get CBC q12 to monitor, and have active type and screen. Transfuse to Hb>10   - Anemia labs indicate no hemolysis, adequate folate and B12, and slight iron deficiency.  Will continue iron supplementation   - FOBT positive; s/p 2u pRBC yesterday   - CBC q12 to monitor

## 2020-07-12 NOTE — PROGRESS NOTE ADULT - ATTENDING COMMENTS
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 continue milrinone, bumex, sildenafil. + SBP on ceftriaxone. planned LHC and coordinated liver biopsy by IR. f/u Hgb, keep > 10, + FOBT, f/u with GI for possible workup.

## 2020-07-13 ENCOUNTER — RESULT REVIEW (OUTPATIENT)
Age: 30
End: 2020-07-13

## 2020-07-13 LAB
ALBUMIN FLD-MCNC: 1.3 G/DL — SIGNIFICANT CHANGE UP
ALBUMIN SERPL ELPH-MCNC: 2.3 G/DL — LOW (ref 3.3–5)
ALBUMIN SERPL ELPH-MCNC: 2.7 G/DL — LOW (ref 3.3–5)
ALP SERPL-CCNC: 140 U/L — HIGH (ref 40–120)
ALP SERPL-CCNC: 173 U/L — HIGH (ref 40–120)
ALT FLD-CCNC: 14 U/L — SIGNIFICANT CHANGE UP (ref 4–41)
ALT FLD-CCNC: 15 U/L — SIGNIFICANT CHANGE UP (ref 4–41)
ANION GAP SERPL CALC-SCNC: 10 MMO/L — SIGNIFICANT CHANGE UP (ref 7–14)
ANION GAP SERPL CALC-SCNC: 13 MMO/L — SIGNIFICANT CHANGE UP (ref 7–14)
AST SERPL-CCNC: 15 U/L — SIGNIFICANT CHANGE UP (ref 4–40)
AST SERPL-CCNC: 18 U/L — SIGNIFICANT CHANGE UP (ref 4–40)
BASOPHILS # BLD AUTO: 0.06 K/UL — SIGNIFICANT CHANGE UP (ref 0–0.2)
BASOPHILS # BLD AUTO: 0.07 K/UL — SIGNIFICANT CHANGE UP (ref 0–0.2)
BASOPHILS NFR BLD AUTO: 0.8 % — SIGNIFICANT CHANGE UP (ref 0–2)
BASOPHILS NFR BLD AUTO: 0.9 % — SIGNIFICANT CHANGE UP (ref 0–2)
BILIRUB SERPL-MCNC: 0.3 MG/DL — SIGNIFICANT CHANGE UP (ref 0.2–1.2)
BILIRUB SERPL-MCNC: 0.3 MG/DL — SIGNIFICANT CHANGE UP (ref 0.2–1.2)
BODY FLUID TYPE: SIGNIFICANT CHANGE UP
BUN SERPL-MCNC: 19 MG/DL — SIGNIFICANT CHANGE UP (ref 7–23)
BUN SERPL-MCNC: 19 MG/DL — SIGNIFICANT CHANGE UP (ref 7–23)
C TRACH RRNA SPEC QL NAA+PROBE: SIGNIFICANT CHANGE UP
CALCIUM SERPL-MCNC: 8 MG/DL — LOW (ref 8.4–10.5)
CALCIUM SERPL-MCNC: 8.1 MG/DL — LOW (ref 8.4–10.5)
CHLORIDE SERPL-SCNC: 93 MMOL/L — LOW (ref 98–107)
CHLORIDE SERPL-SCNC: 96 MMOL/L — LOW (ref 98–107)
CLARITY SPEC: SIGNIFICANT CHANGE UP
CO2 SERPL-SCNC: 24 MMOL/L — SIGNIFICANT CHANGE UP (ref 22–31)
CO2 SERPL-SCNC: 24 MMOL/L — SIGNIFICANT CHANGE UP (ref 22–31)
COLOR FLD: SIGNIFICANT CHANGE UP
CREAT SERPL-MCNC: 0.79 MG/DL — SIGNIFICANT CHANGE UP (ref 0.5–1.3)
CREAT SERPL-MCNC: 0.86 MG/DL — SIGNIFICANT CHANGE UP (ref 0.5–1.3)
CRYSTALS FLD MICRO: SIGNIFICANT CHANGE UP
CULTURE RESULTS: SIGNIFICANT CHANGE UP
EOSINOPHIL # BLD AUTO: 0.04 K/UL — SIGNIFICANT CHANGE UP (ref 0–0.5)
EOSINOPHIL # BLD AUTO: 0.12 K/UL — SIGNIFICANT CHANGE UP (ref 0–0.5)
EOSINOPHIL NFR BLD AUTO: 0.4 % — SIGNIFICANT CHANGE UP (ref 0–6)
EOSINOPHIL NFR BLD AUTO: 1.8 % — SIGNIFICANT CHANGE UP (ref 0–6)
GLUCOSE FLD-MCNC: 113 MG/DL — SIGNIFICANT CHANGE UP
GLUCOSE SERPL-MCNC: 100 MG/DL — HIGH (ref 70–99)
GLUCOSE SERPL-MCNC: 115 MG/DL — HIGH (ref 70–99)
HCT VFR BLD CALC: 30 % — LOW (ref 39–50)
HCT VFR BLD CALC: 33.6 % — LOW (ref 39–50)
HGB BLD-MCNC: 10 G/DL — LOW (ref 13–17)
HGB BLD-MCNC: 10.7 G/DL — LOW (ref 13–17)
IMM GRANULOCYTES NFR BLD AUTO: 1.1 % — SIGNIFICANT CHANGE UP (ref 0–1.5)
IMM GRANULOCYTES NFR BLD AUTO: 1.2 % — SIGNIFICANT CHANGE UP (ref 0–1.5)
LDH SERPL L TO P-CCNC: 65 U/L — SIGNIFICANT CHANGE UP
LYMPHOCYTES # BLD AUTO: 0.45 K/UL — LOW (ref 1–3.3)
LYMPHOCYTES # BLD AUTO: 0.47 K/UL — LOW (ref 1–3.3)
LYMPHOCYTES # BLD AUTO: 5.1 % — LOW (ref 13–44)
LYMPHOCYTES # BLD AUTO: 6.7 % — LOW (ref 13–44)
LYMPHOCYTES NFR FLD: 52 % — SIGNIFICANT CHANGE UP
MAGNESIUM SERPL-MCNC: 1.8 MG/DL — SIGNIFICANT CHANGE UP (ref 1.6–2.6)
MAGNESIUM SERPL-MCNC: 2 MG/DL — SIGNIFICANT CHANGE UP (ref 1.6–2.6)
MCHC RBC-ENTMCNC: 26.3 PG — LOW (ref 27–34)
MCHC RBC-ENTMCNC: 27.9 PG — SIGNIFICANT CHANGE UP (ref 27–34)
MCHC RBC-ENTMCNC: 31.8 % — LOW (ref 32–36)
MCHC RBC-ENTMCNC: 33.3 % — SIGNIFICANT CHANGE UP (ref 32–36)
MCV RBC AUTO: 82.6 FL — SIGNIFICANT CHANGE UP (ref 80–100)
MCV RBC AUTO: 83.6 FL — SIGNIFICANT CHANGE UP (ref 80–100)
MONOCYTES # BLD AUTO: 0.71 K/UL — SIGNIFICANT CHANGE UP (ref 0–0.9)
MONOCYTES # BLD AUTO: 0.75 K/UL — SIGNIFICANT CHANGE UP (ref 0–0.9)
MONOCYTES # FLD: 21 % — SIGNIFICANT CHANGE UP
MONOCYTES NFR BLD AUTO: 11.2 % — SIGNIFICANT CHANGE UP (ref 2–14)
MONOCYTES NFR BLD AUTO: 7.7 % — SIGNIFICANT CHANGE UP (ref 2–14)
N GONORRHOEA RRNA SPEC QL NAA+PROBE: SIGNIFICANT CHANGE UP
NEUTROPHILS # BLD AUTO: 5.22 K/UL — SIGNIFICANT CHANGE UP (ref 1.8–7.4)
NEUTROPHILS # BLD AUTO: 7.84 K/UL — HIGH (ref 1.8–7.4)
NEUTROPHILS NFR BLD AUTO: 78.2 % — HIGH (ref 43–77)
NEUTROPHILS NFR BLD AUTO: 84.9 % — HIGH (ref 43–77)
NEUTS SEG NFR FLD MANUAL: 27 % — SIGNIFICANT CHANGE UP
NRBC # FLD: 0 K/UL — SIGNIFICANT CHANGE UP (ref 0–0)
NRBC # FLD: 0 K/UL — SIGNIFICANT CHANGE UP (ref 0–0)
PHOSPHATE SERPL-MCNC: 3.7 MG/DL — SIGNIFICANT CHANGE UP (ref 2.5–4.5)
PHOSPHATE SERPL-MCNC: 4.2 MG/DL — SIGNIFICANT CHANGE UP (ref 2.5–4.5)
PLATELET # BLD AUTO: 233 K/UL — SIGNIFICANT CHANGE UP (ref 150–400)
PLATELET # BLD AUTO: 299 K/UL — SIGNIFICANT CHANGE UP (ref 150–400)
PMV BLD: 10.1 FL — SIGNIFICANT CHANGE UP (ref 7–13)
PMV BLD: 9.4 FL — SIGNIFICANT CHANGE UP (ref 7–13)
POTASSIUM SERPL-MCNC: 3.6 MMOL/L — SIGNIFICANT CHANGE UP (ref 3.5–5.3)
POTASSIUM SERPL-MCNC: 3.9 MMOL/L — SIGNIFICANT CHANGE UP (ref 3.5–5.3)
POTASSIUM SERPL-SCNC: 3.6 MMOL/L — SIGNIFICANT CHANGE UP (ref 3.5–5.3)
POTASSIUM SERPL-SCNC: 3.9 MMOL/L — SIGNIFICANT CHANGE UP (ref 3.5–5.3)
PROT FLD-MCNC: 2.3 G/DL — SIGNIFICANT CHANGE UP
PROT SERPL-MCNC: 4.8 G/DL — LOW (ref 6–8.3)
PROT SERPL-MCNC: 5.1 G/DL — LOW (ref 6–8.3)
RBC # BLD: 3.59 M/UL — LOW (ref 4.2–5.8)
RBC # BLD: 4.07 M/UL — LOW (ref 4.2–5.8)
RBC # FLD: 17.7 % — HIGH (ref 10.3–14.5)
RBC # FLD: 17.8 % — HIGH (ref 10.3–14.5)
RCV VOL RI: HIGH CELL/UL (ref 0–5)
SODIUM SERPL-SCNC: 130 MMOL/L — LOW (ref 135–145)
SODIUM SERPL-SCNC: 130 MMOL/L — LOW (ref 135–145)
SPECIMEN SOURCE: SIGNIFICANT CHANGE UP
TOTAL CELLS COUNTED, BODY FLUID: 100 CELLS — SIGNIFICANT CHANGE UP
TOTAL NUCLEATED CELL COUNT, BODY FLUID: 317 CELL/UL — HIGH (ref 0–5)
WBC # BLD: 6.68 K/UL — SIGNIFICANT CHANGE UP (ref 3.8–10.5)
WBC # BLD: 9.23 K/UL — SIGNIFICANT CHANGE UP (ref 3.8–10.5)
WBC # FLD AUTO: 6.68 K/UL — SIGNIFICANT CHANGE UP (ref 3.8–10.5)
WBC # FLD AUTO: 9.23 K/UL — SIGNIFICANT CHANGE UP (ref 3.8–10.5)

## 2020-07-13 PROCEDURE — 32555 ASPIRATE PLEURA W/ IMAGING: CPT | Mod: GC,59

## 2020-07-13 PROCEDURE — 93325 DOPPLER ECHO COLOR FLOW MAPG: CPT | Mod: 26

## 2020-07-13 PROCEDURE — 93970 EXTREMITY STUDY: CPT | Mod: 26

## 2020-07-13 PROCEDURE — 88112 CYTOPATH CELL ENHANCE TECH: CPT | Mod: 26

## 2020-07-13 PROCEDURE — 88305 TISSUE EXAM BY PATHOLOGIST: CPT | Mod: 26

## 2020-07-13 PROCEDURE — 93303 ECHO TRANSTHORACIC: CPT | Mod: 26

## 2020-07-13 PROCEDURE — 99233 SBSQ HOSP IP/OBS HIGH 50: CPT | Mod: GC

## 2020-07-13 PROCEDURE — 99291 CRITICAL CARE FIRST HOUR: CPT | Mod: 25

## 2020-07-13 PROCEDURE — 93320 DOPPLER ECHO COMPLETE: CPT | Mod: 26

## 2020-07-13 RX ORDER — ACETAMINOPHEN 500 MG
1000 TABLET ORAL ONCE
Refills: 0 | Status: COMPLETED | OUTPATIENT
Start: 2020-07-13 | End: 2020-07-13

## 2020-07-13 RX ORDER — ALPRAZOLAM 0.25 MG
0.5 TABLET ORAL ONCE
Refills: 0 | Status: DISCONTINUED | OUTPATIENT
Start: 2020-07-13 | End: 2020-07-13

## 2020-07-13 RX ADMIN — Medication 0.5 MILLIGRAM(S): at 23:14

## 2020-07-13 RX ADMIN — Medication 400 MILLIGRAM(S): at 23:13

## 2020-07-13 RX ADMIN — PANTOPRAZOLE SODIUM 40 MILLIGRAM(S): 20 TABLET, DELAYED RELEASE ORAL at 13:08

## 2020-07-13 NOTE — PROGRESS NOTE PEDS - NSHPATTENDINGPLANDISCUSS_GEN_ALL_CORE
Hepatology service, Interventional radiology service, MICU service and Catheterization service.
MICU

## 2020-07-13 NOTE — PROGRESS NOTE PEDS - ASSESSMENT
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  Now admitted for acute on chronic decompensated heart failure and worsening ascites in the setting of Fontan associated liver disease.      #Failing Fontan- multifactorial: pump dysfunction, AV valve regurgitation, high pressures. Diuresing well to date.  - continue current meds including IV bumex and milrinone.   - plan for diagnostic cath on Tuesday (as per prior discussions).  If there is concern for active GI bleeding, will need to discuss timing  - continue sildenafil 20 mg TID  - accept saturations > 85% on room air  - would tap left lung effusion prior to cath  - ongoing discussion and eventual referral to congenital transplant center (heart +/- liver)    #Anemia. Slow drift downwards, s/p 2 units pRBCs. FOBT positive on iron.  - d/w GI thoughts about how to best evaluate bleeding given prior history/interventions  - trend H/H  - goal Hgb > 10 prior to procedure  - would expect Hgb at least 15 given his baseline cyanosis    #Fontan associated liver disease.  - appreciate hepatology input and IR input  - transjugular biopsy would be the best route given concerns for SBP; in this anatomy: the SVC --> extracardiac Fontan baffle --> IVC --> hepatics. 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  Now admitted for acute on chronic decompensated heart failure and worsening ascites in the setting of Fontan associated liver disease.      #Failing Fontan- multifactorial: pump dysfunction, AV valve regurgitation, high pressures. Diuresing well to date.  - continue current meds including IV bumex and milrinone.   - plan for diagnostic cath on Tuesday (as per prior discussions).  If there is concern for active GI bleeding, will need to discuss timing  - continue sildenafil 20 mg TID  - accept saturations > 85% on room air  - would tap left lung effusion prior to cath  - ongoing discussion and eventual referral to congenital transplant center (heart +/- liver)    #Anemia. Slow drift downwards, s/p 2 units pRBCs. FOBT positive on iron.  - d/w GI thoughts about how to best evaluate bleeding given prior history/interventions  - trend H/H  - goal Hgb > 10 prior to procedure  - would expect Hgb at least 15 given his baseline cyanosis    #Fontan associated liver disease.  - appreciate hepatology input and IR input  - transjugular biopsy would be the best route given concerns for SBP; in this anatomy: the SVC --> extracardiac Fontan baffle --> IVC --> hepatics.     We have also discussed the possibility of a thoracentesis today of the left lung in order to optimize hemodyanmic profile during catheterization.  MICU team will perform an ultrasound to see if they can address the fluid accumulation.  On physical exam today, the left lower lobe does not sound to be expanded and percussion suggests there is still significant fluid present.    In discussing the plans with the patient, I told him the plan is to get the information tomorrow that will allow us to make a plan for potential transplantation.  He will be getting an echocardiogram this morning and depending on his cardiac function, we will try to switch him to oral medication in order to follow him as an outpatient.  If that is not possible, then we will have to await the result from the liver biopsy and determine if there is a center we can transfer him to that can perform a combined heart/liver transplant.

## 2020-07-13 NOTE — PROCEDURE NOTE - NSPROCDETAILS_GEN_ALL_CORE
location identified, draped/prepped, sterile technique used, needle inserted/introduced/catheter inserted over needle/connection to syringe/ultrasound assessment of effusion (localization)
sterile dressing applied/ultrasound utilization/location identified, sterile technique used, catheter introduced, fluid drained/Seldinger technique

## 2020-07-13 NOTE — PROGRESS NOTE ADULT - SUBJECTIVE AND OBJECTIVE BOX
Priyank Monson, PGY-1  Internal medicine    INTERVAL HPI/OVERNIGHT EVENTS:  No events O/N. Patient is feeling well this morning and reports no issues. No swelling in the legs. Currently on milrinone drip. Plan for liver bx and LHC tomorrow.     SUBJECTIVE: Patient seen and examined at bedside.     CONSTITUTIONAL: No weakness, fevers or chills  EYES/ENT: No visual changes;  No vertigo or throat pain   NECK: No pain or stiffness  RESPIRATORY: No cough, wheezing, hemoptysis; No shortness of breath  CARDIOVASCULAR: No chest pain or palpitations  GASTROINTESTINAL: No abdominal or epigastric pain. No nausea, vomiting, or hematemesis; No diarrhea or constipation. No melena or hematochezia.  GENITOURINARY: No dysuria, frequency or hematuria  NEUROLOGICAL: No numbness or weakness  SKIN: No itching, rashes    OBJECTIVE:    VITAL SIGNS:  ICU Vital Signs Last 24 Hrs  T(C): 35.7 (13 Jul 2020 12:00), Max: 37.7 (13 Jul 2020 00:00)  T(F): 96.2 (13 Jul 2020 12:00), Max: 99.8 (13 Jul 2020 00:00)  HR: 76 (13 Jul 2020 12:00) (68 - 95)  BP: 100/76 (13 Jul 2020 12:00) (100/76 - 116/48)  BP(mean): 82 (13 Jul 2020 12:00) (63 - 82)  ABP: --  ABP(mean): --  RR: 17 (13 Jul 2020 12:00) (17 - 28)  SpO2: 96% (13 Jul 2020 12:00) (89% - 100%)        07-12 @ 07:01  -  07-13 @ 07:00  --------------------------------------------------------  IN: 299.4 mL / OUT: 3000 mL / NET: -2700.6 mL    07-13 @ 07:01  -  07-13 @ 13:14  --------------------------------------------------------  IN: 367.8 mL / OUT: 1000 mL / NET: -632.2 mL      CAPILLARY BLOOD GLUCOSE          PHYSICAL EXAM:    General: NAD  HEENT: NC/AT; PERRL, clear conjunctiva  Neck: supple  Respiratory: CTA b/l  Cardiovascular: +S1/S2; Wenckebach rhythmn  Abdomen: soft, NT/ND; +BS x4  Extremities: WWP, 2+ peripheral pulses b/l; no LE edema  Skin: normal color and turgor; no rash  Neurological:    MEDICATIONS:  MEDICATIONS  (STANDING):  buMETAnide Injectable 2 milliGRAM(s) IV Push every 8 hours  chlorhexidine 4% Liquid 1 Application(s) Topical <User Schedule>  digoxin     Tablet 0.125 milliGRAM(s) Oral daily  ferrous    sulfate 325 milliGRAM(s) Oral daily  heparin   Injectable 5000 Unit(s) SubCutaneous every 8 hours  melatonin 3 milliGRAM(s) Oral at bedtime  milrinone Infusion 0.125 MICROgram(s)/kG/Min (2.6 mL/Hr) IV Continuous <Continuous>  pantoprazole  Injectable 40 milliGRAM(s) IV Push daily  sildenafil (REVATIO) 20 milliGRAM(s) Oral every 8 hours  spironolactone 25 milliGRAM(s) Oral daily    MEDICATIONS  (PRN):  aluminum hydroxide/magnesium hydroxide/simethicone Suspension 30 milliLiter(s) Oral every 6 hours PRN Dyspepsia      ALLERGIES:  Allergies    No Known Allergies    Intolerances        LABS:                        10.0   6.68  )-----------( 233      ( 13 Jul 2020 05:30 )             30.0     07-13    130<L>  |  96<L>  |  19  ----------------------------<  100<H>  3.6   |  24  |  0.79    Ca    8.0<L>      13 Jul 2020 05:30  Phos  4.2     07-13  Mg     2.0     07-13    TPro  4.8<L>  /  Alb  2.3<L>  /  TBili  0.3  /  DBili  x   /  AST  15  /  ALT  14  /  AlkPhos  140<H>  07-13          RADIOLOGY & ADDITIONAL TESTS: Reviewed.

## 2020-07-13 NOTE — PROGRESS NOTE ADULT - ASSESSMENT
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 However difficult to determine whether ascites is entirely attributed to heart failure and congestive hepatopathy, or whether prolonged liver damage and cirrhosis have led to independent liver fibrosis/cirrhosis. Understanding this can help guide Mr. Acevedo's candidacy for heart transplant. To evaluate his liver fibrosis and/or portal pressures, we can recommend three potential options. First would be assessment of portal pressures by measuring wedged hepatic venous pressures. Second, to assess level of fibrosis, as an outpatient Mr. Acevedo can undergo MR elastography (not done as inpatient). He either way warrants liver imaging to assess for mass lesion, and therefore can undergo MRI liver/ MR elastography as outpatient. Third, and most invasive, would be a liver biopsy to assess level of fibrosis.     # SBP - , patient's ascites fluid was positive for SBP by cell count. It is not uncommon for hospitalized patients with ascites to have asymptomatic SBP, and it may reflect transient bacterial translocation across bowel mucosa. His prior GIB (2/2020) which demonstrated duodenitis and was treated with APC likely does not explain the current SBP. CT A/P does not show any bowel inflammation.     # positive occult blood - not hemodynamically significant, Hg stable, can be followed up as outpt    Recommendations:  - s/p CTX x 5d  - f/u serology labs:  HAV, HBVsAb, HBVsAg, HCV Ab, ceruloplasm, alpha-1 antitrypsin (phenotype), AFP, iron sat/ferritin/TIBC, MICHELLE, anti-smooth muscle, anti-mitochondrial, Anti-liver kidney microsomal antibody  - evaluate of fibrosis as described above  - evaluate portal pressures as described above  - awaiting cardiac cath per cardiology  - diuretics management per MICU/cardiology    Thank you for this interesting consult. Hepatology will continue to follow.     Sylvester Mccray, PGY4  Gastroenterology Fellow  Available on [employer] Teams  [phone] (Long Range Pager)    After 5pm, please contact the on-call GI fellow. 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     # SBP - , patient's ascites fluid was positive for SBP by cell count. It is not uncommon for hospitalized patients with ascites to have asymptomatic SBP, and it may reflect transient bacterial translocation across bowel mucosa. His prior GIB (2/2020) which demonstrated duodenitis and was treated with APC likely does not explain the current SBP. CT A/P does not show any bowel inflammation.     # positive occult blood - not hemodynamically significant, Hg stable, can be followed up as outpt    Recommendations:  - s/p CTX x 5d  - f/u serology labs:  alpha-1 antitrypsin (phenotype), AFP,   - evaluate of fibrosis with outpt elastography vs liver biopsy  - evaluate portal pressures with wedge pressures  - awaiting cardiac cath per cardiology  - diuretics management per MICU/cardiology    Thank you for this interesting consult. Hepatology will continue to follow.     Sylvester Mccray, PGY4  Gastroenterology Fellow  Available on [employer] Teams  [phone] (Long Range Pager)    After 5pm, please contact the on-call GI fellow. [phone] 28 yo M w/ PMHx hypoplastic L heart s/p Fontan procedure, GIB (1/2020) presenting w/ acute on chronic congestive heart failure and worsening ascites, hepatology consulted for ascites management. LVP showed SBP, s/p treatment, improved abd exam, pt. is undergoing diuresis and awaiting cardiac cath.    # volume overload, responding to diuresis with bumetanide and low-dose spironolactone  # cirrhosis - liver is very nodular on CT scan and he has signs of portal hypertension with some abdominal varices. Spleen borderline large. Etiology is likely congestive hepatopathy after Fontan's procedure; however, needs workup for other possible causes.  # ascites, too small to repeat paracentesis - Etiology of patient's worsening ascites is either cardiac or cirrhotic, but it is not possible to distinguish reliably. SAAG changed from >1.1 to <1.1 within 1 day around time of the paracentesis due to dropping serum albumin; presence of SBP argues for cirrhosis as this not typical for cardiac ascites; high ascites protein argues for cardiac etiology, but only one study is available that evaluated the diagnostic role of ascites protein.     # mild hyponatremia, possibly due to cirrhosis or diuresis  # hypoalbuminemia, likely due to cirrhosis    # SBP - , patient's ascites fluid was positive for SBP by cell count. It is not uncommon for hospitalized patients with ascites to have asymptomatic SBP, and it may reflect transient bacterial translocation across bowel mucosa. His prior GIB (2/2020) which demonstrated duodenitis and was treated with APC likely does not explain the current SBP. CT A/P does not show any bowel inflammation.     # positive occult blood - not hemodynamically significant, Hg stable, can be followed up as outpt    Imaging reviewed with radiology, Dr. Muller. Case discussed with Dr. Soto and Dr. Fry who saw the patient earlier during this hospitalization, IR and cardiology.   The CT scan shows definitive cirrhosis. This is a complex case and the question is whether the patient should be evaluated for a heart transplant only, or for a combined heart-liver transplant. It appears that a liver biopsy would not be helpful currently. If one would show bridging fibrosis only, one would have to suspect it to be not representative of the whole liver. If it did show cirrhosis, the biopsy would still not be able to tell what the etiology of the ascites was. Possibly, severe cirrhotic morphology on biopsy could be taken into account as an additional piece of information to assess how advanced it is, however, the value of this has not been established. There is limited literature to guide whether compensated cirrhosis still allows heart transplant without liver transplant, and it is not quite clear if he has ascites due to heart failure or cirrhosis, i.e. if he has decompensated cirrhosis. Would obtain transhepatic pressure measurement at this time.    Recommendations:  - s/p CTX x 5d  - f/u serology labs:  alpha-1 antitrypsin (phenotype), AFP, HBsAg, HBsAb, HBcAb, HCV Ab, MICHELLE, ASMA, IgG, ferritin, TIBC panel, AMA, ceruloplasmin  - evaluate portal pressures with wedge pressures  - awaiting cardiac cath per cardiology    Thank you for this interesting consult. Hepatology will continue to follow.     Sylvester Mccray, PGY4  Gastroenterology Fellow  Available on [employer] Teams  [phone] (Long Range Pager)    After 5pm, please contact the on-call GI fellow. [phone]

## 2020-07-13 NOTE — PROGRESS NOTE ADULT - SUBJECTIVE AND OBJECTIVE BOX
Chief Complaint:  Patient is a 29y old  Male who presents with a chief complaint of Acute decompensated heart failure (13 Jul 2020 08:04)      Interval Events: Over weekend patient continued on diuretics, good UOP, abd distention improving. Finished abx for SBP. No repeat paracentesis performed. Fecal occult blood test positive, though Hg stable. States feeling well this morning, no new complaints. Plan for cardiac cath tomorrow.     Allergies:  No Known Allergies      Hospital Medications:  aluminum hydroxide/magnesium hydroxide/simethicone Suspension 30 milliLiter(s) Oral every 6 hours PRN  buMETAnide Injectable 2 milliGRAM(s) IV Push every 8 hours  chlorhexidine 4% Liquid 1 Application(s) Topical <User Schedule>  digoxin     Tablet 0.125 milliGRAM(s) Oral daily  ferrous    sulfate 325 milliGRAM(s) Oral daily  heparin   Injectable 5000 Unit(s) SubCutaneous every 8 hours  melatonin 3 milliGRAM(s) Oral at bedtime  milrinone Infusion 0.125 MICROgram(s)/kG/Min IV Continuous <Continuous>  pantoprazole  Injectable 40 milliGRAM(s) IV Push daily  sildenafil (REVATIO) 20 milliGRAM(s) Oral every 8 hours  spironolactone 25 milliGRAM(s) Oral daily        PHYSICAL EXAM:   Vital Signs:  Vital Signs Last 24 Hrs  T(C): 36.6 (13 Jul 2020 08:00), Max: 37.7 (13 Jul 2020 00:00)  T(F): 97.8 (13 Jul 2020 08:00), Max: 99.8 (13 Jul 2020 00:00)  HR: 68 (13 Jul 2020 08:00) (68 - 95)  BP: 107/55 (13 Jul 2020 08:00) (107/46 - 116/48)  BP(mean): 68 (13 Jul 2020 04:00) (63 - 71)  RR: 23 (13 Jul 2020 08:00) (14 - 28)  SpO2: 95% (13 Jul 2020 08:00) (86% - 100%)  Daily     Daily     GENERAL:  No acute distress  HEENT:  Normocephalic/atraumtic,  no scleral icterus  CHEST:  Clear to auscultation bilaterally, no wheezes/rales/ronchi, no accessory muscle use  HEART:  Regular rate and rhythm, no murmurs/rubs/gallops  ABDOMEN:  Soft, non-tender, distended. RLQ para site c/d/i  : R scrotal swelling, nontender, reducible into abdomen, L groin inguinal hernia  EXTREMITIES:  No cyanosis, clubbing, or edema  SKIN:  No rash/erythema/ecchymoses/petechiae/wounds/abscess/warm/dry  NEURO:  Alert and oriented x 3, no asterixis, no tremor      LABS:                        10.0   6.68  )-----------( 233      ( 13 Jul 2020 05:30 )             30.0     Mean Cell Volume: 83.6 fL (07-13-20 @ 05:30)    07-13    130<L>  |  96<L>  |  19  ----------------------------<  100<H>  3.6   |  24  |  0.79    Ca    8.0<L>      13 Jul 2020 05:30  Phos  4.2     07-13  Mg     2.0     07-13    TPro  4.8<L>  /  Alb  2.3<L>  /  TBili  0.3  /  DBili  x   /  AST  15  /  ALT  14  /  AlkPhos  140<H>  07-13    LIVER FUNCTIONS - ( 13 Jul 2020 05:30 )  Alb: 2.3 g/dL / Pro: 4.8 g/dL / ALK PHOS: 140 u/L / ALT: 14 u/L / AST: 15 u/L / GGT: x             Ascites fluid:  Cell count: 13K RBC, 1522 WBC, 83%N    Ferritin: 254  TIBC: 205  IgG 797  IgA 113  IgM 3.4    Alpha-1 AT: 270    HAV: IgG positive  HBV: immune  HCV: neg          Imaging: Chief Complaint:  Patient is a 29y old  Male who presents with a chief complaint of Acute decompensated heart failure (13 Jul 2020 08:04)      Interval Events: Over weekend patient continued on diuretics, good UOP, abd distention improving. Finished abx for SBP. No repeat paracentesis performed. Fecal occult blood test positive, though Hg stable. States feeling well this morning, no new complaints. Plan for cardiac cath tomorrow.     Allergies:  No Known Allergies      Hospital Medications:  aluminum hydroxide/magnesium hydroxide/simethicone Suspension 30 milliLiter(s) Oral every 6 hours PRN  buMETAnide Injectable 2 milliGRAM(s) IV Push every 8 hours  chlorhexidine 4% Liquid 1 Application(s) Topical <User Schedule>  digoxin     Tablet 0.125 milliGRAM(s) Oral daily  ferrous    sulfate 325 milliGRAM(s) Oral daily  heparin   Injectable 5000 Unit(s) SubCutaneous every 8 hours  melatonin 3 milliGRAM(s) Oral at bedtime  milrinone Infusion 0.125 MICROgram(s)/kG/Min IV Continuous <Continuous>  pantoprazole  Injectable 40 milliGRAM(s) IV Push daily  sildenafil (REVATIO) 20 milliGRAM(s) Oral every 8 hours  spironolactone 25 milliGRAM(s) Oral daily        PHYSICAL EXAM:   Vital Signs:  Vital Signs Last 24 Hrs  T(C): 36.6 (13 Jul 2020 08:00), Max: 37.7 (13 Jul 2020 00:00)  T(F): 97.8 (13 Jul 2020 08:00), Max: 99.8 (13 Jul 2020 00:00)  HR: 68 (13 Jul 2020 08:00) (68 - 95)  BP: 107/55 (13 Jul 2020 08:00) (107/46 - 116/48)  BP(mean): 68 (13 Jul 2020 04:00) (63 - 71)  RR: 23 (13 Jul 2020 08:00) (14 - 28)  SpO2: 95% (13 Jul 2020 08:00) (86% - 100%)  Daily     Daily     GENERAL:  No acute distress  HEENT:  Normocephalic/atraumtic,  no scleral icterus  CHEST:  Clear to auscultation bilaterally, no wheezes/rales/ronchi, no accessory muscle use  HEART:  Regular rate and rhythm, no murmurs/rubs/gallops  ABDOMEN:  Soft, non-tender, distended. RLQ para site c/d/i  : R scrotal swelling, nontender, reducible into abdomen, L groin inguinal hernia  EXTREMITIES:  No cyanosis, clubbing, or edema  SKIN:  No rash/erythema/ecchymoses/petechiae/wounds/abscess/warm/dry  NEURO:  Alert and oriented x 3, no asterixis, no tremor      LABS:                        10.0   6.68  )-----------( 233      ( 13 Jul 2020 05:30 )             30.0     Mean Cell Volume: 83.6 fL (07-13-20 @ 05:30)    07-13    130<L>  |  96<L>  |  19  ----------------------------<  100<H>  3.6   |  24  |  0.79    Ca    8.0<L>      13 Jul 2020 05:30  Phos  4.2     07-13  Mg     2.0     07-13    TPro  4.8<L>  /  Alb  2.3<L>  /  TBili  0.3  /  DBili  x   /  AST  15  /  ALT  14  /  AlkPhos  140<H>  07-13    LIVER FUNCTIONS - ( 13 Jul 2020 05:30 )  Alb: 2.3 g/dL / Pro: 4.8 g/dL / ALK PHOS: 140 u/L / ALT: 14 u/L / AST: 15 u/L / GGT: x             Ascites fluid:  Cell count: 13K RBC, 1522 WBC, 83%N    Ferritin: 254  TIBC: 205  IgG 797  IgA 113  IgM 3.4  Alpha-1 AT: 270  Ceruloplasm 25  Anti SM:neg  AMA: neg  ALKMneg    HAV: IgG positive  HBV: immune  HCV: neg          Imaging:

## 2020-07-13 NOTE — PROGRESS NOTE ADULT - ATTENDING COMMENTS
1.Acute systolic chf.  Hypoplastic LV .Continue Milrinone and diuretics.For cardiac cath tomorrow.  2. Large L pleural effusion .  S/P thoracentesis. 1400 mls removed. Pt tolerated procedure well.  3. Liver abnormalities seen on CT. For IR bx tomorrow

## 2020-07-13 NOTE — PROGRESS NOTE PEDS - SUBJECTIVE AND OBJECTIVE BOX
Adult Congenital Heart Disease  Follow up Note    Sub: Reports back and pedal edema, worse at the end of the day.  Denies SOB/CP/VOSS/palpitations.  Got 2 units pRBCs. Per MICU fellow, FOBT positive.    Obj:  ICU Vital Signs Last 24 Hrs  T(C): 37 (2020 04:00), Max: 37.7 (2020 00:00)  T(F): 98.6 (2020 04:00), Max: 99.8 (2020 00:00)  HR: 86 (2020 04:00) (71 - 95)  BP: 112/56 (2020 04:00) (107/46 - 116/48)  BP(mean): 68 (2020 04:00) (62 - 71)  RR: 21 (2020 04:00) (14 - 28)  SpO2: 100% (:00) (86% - 100%)       @ 07:01  -   @ 07:00  --------------------------------------------------------  IN: 299.4 mL / OUT: 3000 mL / NET: -2700.6 mL      laying in bed, no distress  mmm, cyanotic  s1 single s2, 2/6 HSM at LLSB to apex  diminished on Left lower lung fields  abdomen soft, + ascites  chronic venous stasis, varicosities to legs  1+ pedal/ankle edema               LABORATORY TESTS:                          10.0  CBC:   6.68 )-----------( 233   (20 @ 05:30)                          30.0               132   |  93    |  20                 Ca: 8.5    BMP:   ----------------------------< 98     M.9   (20 @ 14:30)             4.2    |  29    | 0.92               Ph: 4.1      LFT:     TPro: 5.6 / Alb: 2.8 / TBili: 0.6 / DBili: x / AST: 19 / ALT: 17 / AlkPhos: 159   (20 @ 00:00)    COAG: PT: 15.8 / PTT: 31.4 / INR: 1.36   (20 @ 06:00)     CARDIAC MARKERS:             High-Sensitivity Troponin: 11   (20 @ 06:00)             CK: x / CKMB: x   (20 @ 06:00)             Pro-BNP: x   (20 @ 06:00)  CARDIAC MARKERS:             High-Sensitivity Troponin: 11   (20 @ 15:57)             CK: x / CKMB: x   (20 @ 15:57)             Pro-BNP: 772.7   (20 @ 15:57)      VBG:   pH: 7.49 / pCO2: 37 / pO2: 39 / HCO3: 28 / Base Excess: 4.1 / SaO2: 71.5   (20 @ 15:57)      telemetry: sinus rhythm, wenkebach    MEDICATIONS  (STANDING):  buMETAnide Injectable 2 milliGRAM(s) IV Push every 8 hours  digoxin     Tablet 0.125 milliGRAM(s) Oral daily  milrinone Infusion 0.125 MICROgram(s)/kG/Min IV Continuous <Continuous>  sildenafil (REVATIO) 20 milliGRAM(s) Oral every 8 hours  spironolactone 25 milliGRAM(s) Oral daily  melatonin 3 milliGRAM(s) Oral at bedtime  pantoprazole  Injectable 40 milliGRAM(s) IV Push daily  ferrous    sulfate 325 milliGRAM(s) Oral daily  heparin   Injectable 5000 Unit(s) SubCutaneous every 8 hours      MEDICATIONS  (PRN):  aluminum hydroxide/magnesium hydroxide/simethicone Suspension 30 milliLiter(s) Oral every 6 hours PRN Dyspepsia        EGD/Upper Endoscopy (20 @ 07:39)  --------------------------------------------------  Findings:       The examined esophagus was normal.       The Z-line was regular.       The entire examined stomach was normal.       Patches of punctate erythematous mucosa without active bleeding was        found in the duodenal bulb.      Patches of punctate erythematous mucosa without active bleeding was        found in the first part of the duodenum and in the second part of the        duodenum.       A small blood clot was seen in the second part of the duodenum along        with bilious fluid. Given proximity to the ampulla, a side-viewing        endoscope was introduced to better evaluate the region. A few diminutive        angioectasias were found just proximal to the ampulla, the presumed        source of the patient's bleeding. Coagulation for hemostasis using argon        plasma was successful. To prevent bleeding post-intervention, one        hemostatic clip was successfully placed (MR conditional). There was no        bleeding at the end of the procedure.       The ampulla was normal in appearance.

## 2020-07-13 NOTE — PROGRESS NOTE ADULT - ASSESSMENT
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  Sent from clinic for IV diuresis and milrinone therapy.      #Neuro   - No acute issues, AOx3    #Cardiovascular  Acute Decompensated Heart Failure   - continue diuresis with Bumex 2mg q8h and home aldactone; goal at least 1L net negative per day   - continue milrinone gtt   - continue sildenafil 20mg q8h   - Continue home digoxin   - HOLD home lisinopril per Congenital Heart team   - C scheduled for 7/14  Sinus Arrhythmia   - Patient has baseline Wenkeboch rhythm, telemetry consistent with this   - Peds Cards aware    #Respiratory   - Patient baseline sat 88%   - Currently satting 100% on room air   - Comfortable lying flat, subjectively asymptomatic from respiratory perspective  Left Pleural Effusion   - Peds Cards would like a thoracentesis prior to cardiac cath, will be done today, 7/13 to avoid reaccumulation prior to LHC    #Gastrointestinal  Spontaneous bacterial peritonitis   - Peritoneal fluid analysis demonstrates PMNs 1211, gram stain with PMNs no organism seen.  Diagnostic for SBP.  Patient remains asymptomatic, no fevers, no WBC elevation, no abdominal pain   - Ceftriaxone 2g q24, 5 day course ending 7/12   - No viable fluid pocket for repeat paracentesis   - Will require prophylaxis with cipro going forward, see below  Gastritis   - Continue home protonix   - History of GI bleed   - Patient notes dark stools since restarting iron, Hgb remains stable   - FOBT positive 7/11, with slow drop in anemia now s/p 2u pRBCs, goal hgb >10 per peds cards. Hgb 10.0 today, 7/13.  Congestive Hepatopathy   - IR to perform wedged hepatic venous pressure gradient and transjugular liver biopsy, scheduled for 7/14    #Renal/   - Continue diuresis with Bumex gtt and aldactone   - Baseline Cr appears to be ~0.8  Hydrocele   - Right hydrocele & Left varicocele   - Per urology, increased abdominal fluid and pressure likely the etiology behind the hydrocele development, and it is likely to improve after these issues are addressed; nothing additional to do   - GC pending    #Infectious Disease  Spontaneous Bacterial Peritonitis   - as above, ceftriaxone 2g q24 ending 7/12. Then switch to PO cipro 500 mg daily for secondary SBP prophylaxis.     - No viable pocket for repeat para    #Endo   - No acute issues    #Heme  Anemia   - Has baseline anemia usually ~13.0   - Current hgb 9-11 this hospitalization; all cell lines down including plts and wbc   - Continue to get CBC q12 to monitor, and have active type and screen. Transfuse to Hb>10   - Anemia labs indicate no hemolysis, adequate folate and B12, and slight iron deficiency.  Will continue iron supplementation   - FOBT positive; s/p 2u pRBC   - CBC q12 to monitor

## 2020-07-14 ENCOUNTER — APPOINTMENT (OUTPATIENT)
Dept: UROLOGY | Facility: CLINIC | Age: 30
End: 2020-07-14

## 2020-07-14 LAB
ALBUMIN SERPL ELPH-MCNC: 2.4 G/DL — LOW (ref 3.3–5)
ALP SERPL-CCNC: 155 U/L — HIGH (ref 40–120)
ALT FLD-CCNC: 16 U/L — SIGNIFICANT CHANGE UP (ref 4–41)
ANION GAP SERPL CALC-SCNC: 10 MMO/L — SIGNIFICANT CHANGE UP (ref 7–14)
APTT BLD: 29.7 SEC — SIGNIFICANT CHANGE UP (ref 27–36.3)
AST SERPL-CCNC: 21 U/L — SIGNIFICANT CHANGE UP (ref 4–40)
BASOPHILS # BLD AUTO: 0.04 K/UL — SIGNIFICANT CHANGE UP (ref 0–0.2)
BASOPHILS NFR BLD AUTO: 0.5 % — SIGNIFICANT CHANGE UP (ref 0–2)
BILIRUB SERPL-MCNC: 0.4 MG/DL — SIGNIFICANT CHANGE UP (ref 0.2–1.2)
BLD GP AB SCN SERPL QL: NEGATIVE — SIGNIFICANT CHANGE UP
BUN SERPL-MCNC: 22 MG/DL — SIGNIFICANT CHANGE UP (ref 7–23)
CALCIUM SERPL-MCNC: 7.9 MG/DL — LOW (ref 8.4–10.5)
CHLORIDE SERPL-SCNC: 95 MMOL/L — LOW (ref 98–107)
CO2 SERPL-SCNC: 24 MMOL/L — SIGNIFICANT CHANGE UP (ref 22–31)
CREAT SERPL-MCNC: 0.81 MG/DL — SIGNIFICANT CHANGE UP (ref 0.5–1.3)
EOSINOPHIL # BLD AUTO: 0.08 K/UL — SIGNIFICANT CHANGE UP (ref 0–0.5)
EOSINOPHIL NFR BLD AUTO: 1.1 % — SIGNIFICANT CHANGE UP (ref 0–6)
GLUCOSE SERPL-MCNC: 97 MG/DL — SIGNIFICANT CHANGE UP (ref 70–99)
GRAM STN FLD: SIGNIFICANT CHANGE UP
HCT VFR BLD CALC: 30.9 % — LOW (ref 39–50)
HGB BLD-MCNC: 9.9 G/DL — LOW (ref 13–17)
IMM GRANULOCYTES NFR BLD AUTO: 1.5 % — SIGNIFICANT CHANGE UP (ref 0–1.5)
INR BLD: 1.18 — HIGH (ref 0.88–1.17)
LYMPHOCYTES # BLD AUTO: 0.46 K/UL — LOW (ref 1–3.3)
LYMPHOCYTES # BLD AUTO: 6.1 % — LOW (ref 13–44)
MAGNESIUM SERPL-MCNC: 1.8 MG/DL — SIGNIFICANT CHANGE UP (ref 1.6–2.6)
MCHC RBC-ENTMCNC: 27 PG — SIGNIFICANT CHANGE UP (ref 27–34)
MCHC RBC-ENTMCNC: 32 % — SIGNIFICANT CHANGE UP (ref 32–36)
MCV RBC AUTO: 84.4 FL — SIGNIFICANT CHANGE UP (ref 80–100)
MONOCYTES # BLD AUTO: 0.71 K/UL — SIGNIFICANT CHANGE UP (ref 0–0.9)
MONOCYTES NFR BLD AUTO: 9.5 % — SIGNIFICANT CHANGE UP (ref 2–14)
NEUTROPHILS # BLD AUTO: 6.08 K/UL — SIGNIFICANT CHANGE UP (ref 1.8–7.4)
NEUTROPHILS NFR BLD AUTO: 81.3 % — HIGH (ref 43–77)
NON-GYNECOLOGICAL CYTOLOGY STUDY: SIGNIFICANT CHANGE UP
NRBC # FLD: 0 K/UL — SIGNIFICANT CHANGE UP (ref 0–0)
PHOSPHATE SERPL-MCNC: 4.3 MG/DL — SIGNIFICANT CHANGE UP (ref 2.5–4.5)
PLATELET # BLD AUTO: 247 K/UL — SIGNIFICANT CHANGE UP (ref 150–400)
PMV BLD: 9.8 FL — SIGNIFICANT CHANGE UP (ref 7–13)
POTASSIUM SERPL-MCNC: 3.8 MMOL/L — SIGNIFICANT CHANGE UP (ref 3.5–5.3)
POTASSIUM SERPL-SCNC: 3.8 MMOL/L — SIGNIFICANT CHANGE UP (ref 3.5–5.3)
PROT SERPL-MCNC: 4.9 G/DL — LOW (ref 6–8.3)
PROTHROM AB SERPL-ACNC: 13.5 SEC — HIGH (ref 9.8–13.1)
RBC # BLD: 3.66 M/UL — LOW (ref 4.2–5.8)
RBC # FLD: 17.8 % — HIGH (ref 10.3–14.5)
RH IG SCN BLD-IMP: POSITIVE — SIGNIFICANT CHANGE UP
SODIUM SERPL-SCNC: 129 MMOL/L — LOW (ref 135–145)
SPECIMEN SOURCE: SIGNIFICANT CHANGE UP
WBC # BLD: 7.48 K/UL — SIGNIFICANT CHANGE UP (ref 3.8–10.5)
WBC # FLD AUTO: 7.48 K/UL — SIGNIFICANT CHANGE UP (ref 3.8–10.5)

## 2020-07-14 PROCEDURE — 99232 SBSQ HOSP IP/OBS MODERATE 35: CPT | Mod: GC

## 2020-07-14 PROCEDURE — 99291 CRITICAL CARE FIRST HOUR: CPT

## 2020-07-14 RX ORDER — LIDOCAINE 4 G/100G
1 CREAM TOPICAL ONCE
Refills: 0 | Status: COMPLETED | OUTPATIENT
Start: 2020-07-14 | End: 2020-07-14

## 2020-07-14 RX ORDER — ALPRAZOLAM 0.25 MG
0.5 TABLET ORAL ONCE
Refills: 0 | Status: DISCONTINUED | OUTPATIENT
Start: 2020-07-14 | End: 2020-07-14

## 2020-07-14 RX ADMIN — Medication 0.5 MILLIGRAM(S): at 17:36

## 2020-07-14 RX ADMIN — PANTOPRAZOLE SODIUM 40 MILLIGRAM(S): 20 TABLET, DELAYED RELEASE ORAL at 12:56

## 2020-07-14 RX ADMIN — LIDOCAINE 1 PATCH: 4 CREAM TOPICAL at 08:10

## 2020-07-14 RX ADMIN — LIDOCAINE 1 PATCH: 4 CREAM TOPICAL at 13:00

## 2020-07-14 NOTE — PROGRESS NOTE ADULT - ATTENDING COMMENTS
1.Acute systolic chf.  Hypoplastic LV S/P Fontan procedure.  .Continue Milrinone and diuretics.  Cardiac cath  shows normal Fontan physiology. For outpatient eval for cardiac transplant.  2. Large L pleural effusion .  S/P thoracentesis. 1400 mls removed. Pt tolerated procedure well.  3. Liver abnormalities seen on CT. Hold off on bx for now.

## 2020-07-14 NOTE — PROGRESS NOTE ADULT - ASSESSMENT
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  Sent from clinic for IV diuresis and milrinone therapy.      #Neuro   - No acute issues, AOx3    #Cardiovascular  Acute Decompensated Heart Failure   - continue diuresis with Bumex 2mg q8h and home aldactone; goal at least 1L net negative per day   - continue milrinone gtt   - continue sildenafil 20mg q8h   - Continue home digoxin   - HOLD home lisinopril per Congenital Heart team   - [facility] scheduled for 7/14 today   - vascular checks q6 after procedure  Sinus Arrhythmia   - Patient has baseline Wenkeboch rhythm, telemetry consistent with this   - Peds Cards aware    #Respiratory   - Patient baseline sat 88%   - Currently sat at 93% on room air   - Comfortable lying flat, subjectively asymptomatic from respiratory perspective  - Thoracentesis yesterday. Sample was bloody, cell count 317, RBC count 96443, seg count 27, lymphocytes 52, monocytes 21, no organisms.    #Gastrointestinal  Spontaneous bacterial peritonitis   - Peritoneal fluid analysis demonstrates PMNs 1211, gram stain with PMNs no organism seen.  Diagnostic for SBP.  Patient remains asymptomatic, no fevers, no WBC elevation, no abdominal pain   - Ceftriaxone 2g q24, 5 day course ending 7/12   - No viable fluid pocket for repeat paracentesis  Gastritis   - Continue home protonix   - History of GI bleed   - Patient notes dark stools since restarting iron, Hgb remains stable   - FOBT positive 7/11, with slow drop in anemia now s/p 2u pRBCs, goal hgb >10 per peds cards. Hgb 10.0 today, 7/13.  Congestive Hepatopathy   - IR no longer performing liver bx    #Renal/   - Continue diuresis with Bumex gtt and aldactone   - Baseline Cr appears to be ~0.8  Hydrocele   - Right hydrocele & Left varicocele   - Per urology, increased abdominal fluid and pressure likely the etiology behind the hydrocele development, and it is likely to improve after these issues are addressed; nothing additional to do   - GC pending    #Infectious Disease  Spontaneous Bacterial Peritonitis   - as above, ceftriaxone 2g q24 ending 7/12. Then switch to PO cipro 500 mg daily for secondary SBP prophylaxis.     - No viable pocket for repeat para    #Endo   - No acute issues    #Heme  Anemia   - Has baseline anemia usually ~13.0   - Current hgb 9-11 this hospitalization; all cell lines down including plts and wbc   - Continue to get CBC q12 to monitor, and have active type and screen. Transfuse to Hb>10   - Anemia labs indicate no hemolysis, adequate folate and B12, and slight iron deficiency.  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  Sent from clinic for IV diuresis and milrinone therapy.      #Neuro   - No acute issues, AOx3    #Cardiovascular  Acute Decompensated Heart Failure   - continue diuresis with Bumex 2mg q8h and home aldactone; goal at least 1L net negative per day   - continue milrinone gtt   - continue sildenafil 20mg q8h   - Continue home digoxin   - HOLD home lisinopril per Congenital Heart team   - [facility] scheduled for 7/14 today, F/u with cardio regarding results   - vascular checks q6 after procedure  Sinus Arrhythmia   - Patient has baseline Wenkeboch rhythm, telemetry consistent with this   - Peds Cards aware    #Respiratory   - Patient baseline sat 88%   - Currently sat at 93% on room air   - Comfortable lying flat, subjectively asymptomatic from respiratory perspective  - Thoracentesis yesterday. Sample was bloody, cell count 317, RBC count 01719, seg count 27, lymphocytes 52, monocytes 21, no organisms.    #Gastrointestinal  Spontaneous bacterial peritonitis   - Peritoneal fluid analysis demonstrates PMNs 1211, gram stain with PMNs no organism seen.  Diagnostic for SBP.  Patient remains asymptomatic, no fevers, no WBC elevation, no abdominal pain   - Ceftriaxone 2g q24, 5 day course ending 7/12   - No viable fluid pocket for repeat paracentesis  Gastritis   - Continue home protonix   - History of GI bleed   - Patient notes dark stools since restarting iron, Hgb remains stable   - FOBT positive 7/11, with slow drop in anemia now s/p 2u pRBCs, goal hgb >10 per peds cards. Hgb 10.0 today, 7/13.  Congestive Hepatopathy   - IR no longer performing liver bx  - Plan to have upper endoscopy to assess for varices. NPO after midnight.     #Renal/   - Continue diuresis with Bumex gtt and aldactone   - Baseline Cr appears to be ~0.8  Hydrocele   - Right hydrocele & Left varicocele   - Per urology, increased abdominal fluid and pressure likely the etiology behind the hydrocele development, and it is likely to improve after these issues are addressed; nothing additional to do   - GC pending    #Infectious Disease  Spontaneous Bacterial Peritonitis   - as above, ceftriaxone 2g q24 ending 7/12. Then switch to PO cipro 500 mg daily for secondary SBP prophylaxis.     - No viable pocket for repeat para    #Endo   - No acute issues    #Heme  Anemia   - Has baseline anemia usually ~13.0   - Current hgb 9-11 this hospitalization; all cell lines down including plts and wbc   - Continue to get CBC q12 to monitor, and have active type and screen. Transfuse to Hb>10   - Anemia labs indicate no hemolysis, adequate folate and B12, and slight iron deficiency.  Will continue iron supplementation   - FOBT positive; s/p 2u pRBC   - CBC q12 to monitor

## 2020-07-14 NOTE — PROCEDURE NOTE - ADDITIONAL PROCEDURE DETAILS
Access: RFA 5Fr, RFV 7Fr.  Sat(%): MV 73 & 68, Ao 98 w nl C.O 5.4-6.4 L/min with no intracardiac shunt.   Pressures (mmHg): SVC & Fontan of 12-13 w no gradient to branch PAs. LPCW=RVEDp 9mmHg. RPCW of 8. Pulsatile RPA flow is seen. PVR ~2 WUm2.   Angiogram: Patent Fontan circuit. Diffuse hypoplasia of LPA ~5 mm w RPA of ~10 mm. No collaterals seen.   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   - Return back to MICU  - Lie flat for 5 hours.   - Resume home meds.   - Regular diet as tolerated. Access: RFA 5Fr, RFV 7Fr.  Sat(%): MV 73 & 68, Ao 98 w nl C.O 5.4-6.4 L/min with no intracardiac shunt.   Pressures (mmHg): SVC & Fontan of 12-13 w no gradient to branch PAs. LPCW=RVEDp 9mmHg. RPCW of 8. Pulsatile RPA flow is observed. PVR ~2 WUm2.   Angiogram: Patent Fontan circuit. Diffuse hypoplasia of LPA ~5 mm w RPA of ~10 mm. No collaterals seen. Coronaries looks normal.   A&P:  30 yo M w HLHS s/p lateral tunnel Fontan who was admitted with likely decompensated heart failure now s/p cardiac cath with showing favorable Fontan hemodynamics with mean pressure of 12-13 mmHg.   - Return back to MICU  - Lie flat for 5 hours.   - Resume home meds.   - Regular diet as tolerated. Access: RFA 5Fr, RFV 7Fr w US guidance  Sat(%): MV 68-73, Ao 98 w nl C.O 5.4-6.4 L/min on 5 L O2 & milrinone with no intracardiac shunt.   Pressures (mmHg): SVC & Fontan of 12-13 w no signifi gradient to branch PAs. L/RPCW=RVEDp 8-9mmHg. PVR <2 WUm2. Hepait venous wedge 15-16 mmHg.  Angiogram: Unobstructed Justice (mostly to larger RPA) & tortuous lateral-tunnel Fontan w diffusely small LPA (10mm vs RPA 15mm delilah). Nl levophase.  No obvious APCs, unobstructed Renea with hypoplastic AscAo filling nl coronaries.  A&P: 28 yo M w HLHS s/p lateral tunnel Fontan admitted with ascites & pleural effusion(s) that have been tapped & now s/p cardiac cath with unremarkable Fontan hemodynamics (m 12-13 mmHg) despite failing Fontan physiology on milrinone.  - Return to MICU & restart meds  - Lie flat for 5 hours with RLE vascular checks   - Further care with goal to d/c home & refer for outpt heart failure/transplant eval'n  - Above shared with MICU/ACHD teams & pt/mother

## 2020-07-14 NOTE — PROGRESS NOTE ADULT - ASSESSMENT
30 yo M w/ PMHx hypoplastic L heart s/p Fontan procedure, GIB (1/2020) presenting w/ acute on chronic congestive heart failure and worsening ascites, hepatology consulted for ascites management. LVP showed SBP, s/p treatment, improved abd exam, pt. is undergoing diuresis and awaiting cardiac cath.    # volume overload, responding to diuresis with bumetanide and low-dose spironolactone  # cirrhosis - liver is very nodular on CT scan and he has signs of portal hypertension with some abdominal varices. Spleen borderline large. Etiology is likely congestive hepatopathy after Fontan's procedure; however, needs workup for other possible causes.  # ascites, too small to repeat paracentesis - Etiology of patient's worsening ascites is either cardiac or cirrhotic, but it is not possible to distinguish reliably. SAAG changed from >1.1 to <1.1 within 1 day around time of the paracentesis due to dropping serum albumin; presence of SBP argues for cirrhosis as this not typical for cardiac ascites; high ascites protein argues for cardiac etiology, but only one study is available that evaluated the diagnostic role of ascites protein.     # mild hyponatremia, possibly due to cirrhosis or diuresis  # hypoalbuminemia, likely due to cirrhosis    # SBP - , patient's ascites fluid was positive for SBP by cell count. It is not uncommon for hospitalized patients with ascites to have asymptomatic SBP, and it may reflect transient bacterial translocation across bowel mucosa. His prior GIB (2/2020) which demonstrated duodenitis and was treated with APC likely does not explain the current SBP. CT A/P does not show any bowel inflammation.     # positive occult blood - not hemodynamically significant, Hg stable, can be followed up as outpt    Imaging reviewed with radiology, Dr. Muller. Case discussed with Dr. Soto and Dr. Fry who saw the patient earlier during this hospitalization, IR and cardiology.   The CT scan shows definitive cirrhosis. This is a complex case and the question is whether the patient should be evaluated for a heart transplant only, or for a combined heart-liver transplant. It appears that a liver biopsy would not be helpful currently. If one would show bridging fibrosis only, one would have to suspect it to be not representative of the whole liver. If it did show cirrhosis, the biopsy would still not be able to tell what the etiology of the ascites was. Possibly, severe cirrhotic morphology on biopsy could be taken into account as an additional piece of information to assess how advanced it is, however, the value of this has not been established. There is limited literature to guide whether compensated cirrhosis still allows heart transplant without liver transplant, and it is not quite clear if he has ascites due to heart failure or cirrhosis, i.e. if he has decompensated cirrhosis. Would obtain transhepatic pressure measurement at this time.    Recommendations:  - s/p CTX x 5d  - f/u serology labs:  alpha-1 antitrypsin (phenotype), AFP, HBsAg, HBsAb, HBcAb, HCV Ab, MICHELLE, ASMA, IgG, ferritin, TIBC panel, AMA, ceruloplasmin  - evaluate portal pressures with wedge pressures  - awaiting cardiac cath per cardiology  - defer liver biopsy at this time    Thank you for this interesting consult. Hepatology will continue to follow.     Sylvester Mccray, PGY4  Gastroenterology Fellow  Available on [employer] Teams  [phone] (Long Range Pager)    After 5pm, please contact the on-call GI fellow. [phone] 30 yo M w/ PMHx hypoplastic L heart s/p Fontan procedure, GIB (1/2020) presenting w/ acute on chronic congestive heart failure and worsening ascites, hepatology consulted for ascites management. LVP showed SBP, s/p treatment, improved abd exam, pt. is undergoing diuresis and awaiting cardiac cath.    # volume overload, responding to diuresis with bumetanide and low-dose spironolactone, milrinone-assisted  # Fontan-associated liver disease (FALD) with cirrhosis - liver is very nodular on CT scan and he has signs of portal hypertension with some abdominal varices. Spleen borderline large. Etiology is thought to be chronic hepatic congestion; however, he needs workup for other possible causes.  # ascites, too small to repeat paracentesis - Etiology of patient's worsening ascites is either cardiac or cirrhotic, but it is difficult to distinguish reliably. SAAG changed from >1.1 to <1.1 within 1 day around time of the paracentesis due to dropping serum albumin  # peritonitis with negative culture - likely SBP    # mild hyponatremia, possibly due to cirrhosis or diuresis  # hypoalbuminemia, likely due to cirrhosis    # positive occult blood - not hemodynamically significant, Hg stable, can be followed up as outpt. H/o GIB (2/2020) which demonstrated duodenitis     Regarding the etiology of his ascites:  - his high ascites protein and the good response of his volume overload to a "cardiac" regimen of diuretics argue for cardiac ascites. However, only one study is available that evaluated the diagnostic role of ascites protein.   - the presence of SBP argues for cirrhosis as etiology, as it is uncommon in other ascites.    There is extremely limited literature on specific features of FALD that could guide a decision for cardiac transplant vs. combined heart-liver transplant.    Recommendations:  - s/p CTX x 5d  - f/u serology labs:  alpha-1 antitrypsin (phenotype), AFP, HBsAg, HBsAb, HBcAb, HCV Ab, MICHELLE, ASMA, IgG, ferritin, TIBC panel, AMA, ceruloplasmin  - evaluate portal pressures with wedge pressures  - awaiting cardiac cath per cardiology  - defer liver biopsy at this time    Thank you for this interesting consult. Hepatology will continue to follow.     Sylvester Mccray, PGY4  Gastroenterology Fellow  Available on Microsoft Teams  [phone] (Long Range Pager)    After 5pm, please contact the on-call GI fellow. [phone]

## 2020-07-14 NOTE — PROGRESS NOTE ADULT - SUBJECTIVE AND OBJECTIVE BOX
Adult Congenital Heart Disease  Follow up Note    Sub:     Obj:  ICU Vital Signs Last 24 Hrs  T(C): 36.2 (2020 08:00), Max: 37.2 (2020 00:00)  T(F): 97.2 (2020 08:00), Max: 99 (2020 00:00)  HR: 67 (2020 08:00) (67 - 82)  BP: 96/67 (2020 08:00) (91/50 - 110/52)  BP(mean): 58 (2020 04:00) (58 - 83)  RR: 20 (2020 08:00) (17 - 262)  SpO2: 93% (2020 08:00) (89% - 96%)      13 @ 07:01  -   @ 07:00  --------------------------------------------------------  IN: 1022.4 mL / OUT: 4200 mL / NET: -3177.6 mL      laying in bed, no distress  mmm, cyanotic  s1 single s2, 2/6 HSM at LLSB to apex  diminished on Left lower lung fields  abdomen soft, + ascites  chronic venous stasis, varicosities to legs  1+ pedal/ankle edema               LABORATORY TESTS:                            9.9  CBC:   7.48 )-----------( 247   (20 @ 04:30)                          30.9               129   |  95    |  22                 Ca: 7.9    BMP:   ----------------------------< 97     M.8   (20 @ 04:30)             3.8    |  24    | 0.81               Ph: 4.3      LFT:     TPro: 4.9 / Alb: 2.4 / TBili: 0.4 / DBili: x / AST: 21 / ALT: 16 / AlkPhos: 155   (20 @ 04:30)    COAG: PT: 13.5 / PTT: 29.7 / INR: 1.18   (07-14-20 @ 04:30)     CARDIAC MARKERS:             High-Sensitivity Troponin: 11   (20 @ 06:00)             CK: x / CKMB: x   (20 @ 06:00)             Pro-BNP: x   (20 @ 06:00)  CARDIAC MARKERS:             High-Sensitivity Troponin: 11   (20 @ 15:57)             CK: x / CKMB: x   (20 @ 15:57)             Pro-BNP: 772.7   (20 @ 15:57)      VBG:   pH: 7.49 / pCO2: 37 / pO2: 39 / HCO3: 28 / Base Excess: 4.1 / SaO2: 71.5   (20 @ 15:57)      telemetry: sinus rhythm, wenkebach    MEDICATIONS  (STANDING):  buMETAnide Injectable 2 milliGRAM(s) IV Push every 8 hours  digoxin     Tablet 0.125 milliGRAM(s) Oral daily  milrinone Infusion 0.125 MICROgram(s)/kG/Min IV Continuous <Continuous>  sildenafil (REVATIO) 20 milliGRAM(s) Oral every 8 hours  spironolactone 25 milliGRAM(s) Oral daily  melatonin 3 milliGRAM(s) Oral at bedtime  pantoprazole  Injectable 40 milliGRAM(s) IV Push daily  ferrous    sulfate 325 milliGRAM(s) Oral daily      MEDICATIONS  (PRN):  aluminum hydroxide/magnesium hydroxide/simethicone Suspension 30 milliLiter(s) Oral every 6 hours PRN Dyspepsia        EGD/Upper Endoscopy (20 @ 07:39)  --------------------------------------------------  Findings:       The examined esophagus was normal.       The Z-line was regular.       The entire examined stomach was normal.       Patches of punctate erythematous mucosa without active bleeding was        found in the duodenal bulb.      Patches of punctate erythematous mucosa without active bleeding was        found in the first part of the duodenum and in the second part of the        duodenum.       A small blood clot was seen in the second part of the duodenum along        with bilious fluid. Given proximity to the ampulla, a side-viewing        endoscope was introduced to better evaluate the region. A few diminutive        angioectasias were found just proximal to the ampulla, the presumed        source of the patient's bleeding. Coagulation for hemostasis using argon        plasma was successful. To prevent bleeding post-intervention, one        hemostatic clip was successfully placed (MR conditional). There was no        bleeding at the end of the procedure.       The ampulla was normal in appearance. Adult Congenital Heart Disease  Follow up Note    Sub: Yesterday had thoracocentesis performed with drainage of 1400 cc of pleural fluid. Complains of pain at the thoracocentesis site. Continues to be NPO for cardiac cath today. -3 L negative today.     Obj:  ICU Vital Signs Last 24 Hrs  T(C): 36.2 (2020 08:00), Max: 37.2 (2020 00:00)  T(F): 97.2 (2020 08:00), Max: 99 (2020 00:00)  HR: 67 (2020 08:00) (67 - 82)  BP: 96/67 (2020 08:00) (91/50 - 110/52)  BP(mean): 58 (2020 04:00) (58 - 83)  RR: 20 (2020 08:00) (17 - 262)  SpO2: 93% (2020 08:00) (89% - 96%)        07-13 @ 07:01  -   @ 07:00  --------------------------------------------------------  IN: 1022.4 mL / OUT: 4200 mL / NET: -3177.6 mL        laying in bed, no distress  mmm, cyanotic  s1 single s2, 2/6 HSM at LLSB to apex  diminished on Left lower lung fields  abdomen soft, + ascites  chronic venous stasis, varicosities to legs  1+ pedal/ankle edema               LABORATORY TESTS:                            9.9  CBC:   7.48 )-----------( 247   (20 @ 04:30)                          30.9               129   |  95    |  22                 Ca: 7.9    BMP:   ----------------------------< 97     M.8   (20 @ 04:30)             3.8    |  24    | 0.81               Ph: 4.3      LFT:     TPro: 4.9 / Alb: 2.4 / TBili: 0.4 / DBili: x / AST: 21 / ALT: 16 / AlkPhos: 155   (20 @ 04:30)    COAG: PT: 13.5 / PTT: 29.7 / INR: 1.18   (20 @ 04:30)     CARDIAC MARKERS:             High-Sensitivity Troponin: 11   (20 @ 06:00)             CK: x / CKMB: x   (20 @ 06:00)             Pro-BNP: x   (20 @ 06:00)  CARDIAC MARKERS:             High-Sensitivity Troponin: 11   (20 @ 15:57)             CK: x / CKMB: x   (20 @ 15:57)             Pro-BNP: 772.7   (20 @ 15:57)      VBG:   pH: 7.49 / pCO2: 37 / pO2: 39 / HCO3: 28 / Base Excess: 4.1 / SaO2: 71.5   (20 @ 15:57)      telemetry: sinus rhythm, wenkebach    MEDICATIONS  (STANDING):  buMETAnide Injectable 2 milliGRAM(s) IV Push every 8 hours  digoxin     Tablet 0.125 milliGRAM(s) Oral daily  milrinone Infusion 0.125 MICROgram(s)/kG/Min IV Continuous <Continuous>  sildenafil (REVATIO) 20 milliGRAM(s) Oral every 8 hours  spironolactone 25 milliGRAM(s) Oral daily  melatonin 3 milliGRAM(s) Oral at bedtime  pantoprazole  Injectable 40 milliGRAM(s) IV Push daily  ferrous    sulfate 325 milliGRAM(s) Oral daily      MEDICATIONS  (PRN):  aluminum hydroxide/magnesium hydroxide/simethicone Suspension 30 milliLiter(s) Oral every 6 hours PRN Dyspepsia        EGD/Upper Endoscopy (20 @ 07:39)  --------------------------------------------------  Findings:       The examined esophagus was normal.       The Z-line was regular.       The entire examined stomach was normal.       Patches of punctate erythematous mucosa without active bleeding was        found in the duodenal bulb.      Patches of punctate erythematous mucosa without active bleeding was        found in the first part of the duodenum and in the second part of the        duodenum.       A small blood clot was seen in the second part of the duodenum along        with bilious fluid. Given proximity to the ampulla, a side-viewing        endoscope was introduced to better evaluate the region. A few diminutive        angioectasias were found just proximal to the ampulla, the presumed        source of the patient's bleeding. Coagulation for hemostasis using argon        plasma was successful. To prevent bleeding post-intervention, one        hemostatic clip was successfully placed (MR conditional). There was no        bleeding at the end of the procedure.       The ampulla was normal in appearance.

## 2020-07-14 NOTE — CHART NOTE - NSCHARTNOTEFT_GEN_A_CORE
Source: Patient , nursing & EMR    Diet : NPO  for procedure     Patient had good appetite and PO , currently NPO , Pt. alert, oriented , with some edema - 1+ L & R arm and 2+ back , abdomen distended .     Current Weight: - 67.8 kg on 7/14/2020; Admit wt. - 69.2 kg     Pertinent Medications: MEDICATIONS  (STANDING):  buMETAnide Injectable 2 milliGRAM(s) IV Push every 8 hours  chlorhexidine 4% Liquid 1 Application(s) Topical <User Schedule>  digoxin     Tablet 0.125 milliGRAM(s) Oral daily  ferrous    sulfate 325 milliGRAM(s) Oral daily  melatonin 3 milliGRAM(s) Oral at bedtime  milrinone Infusion 0.125 MICROgram(s)/kG/Min (2.6 mL/Hr) IV Continuous <Continuous>  pantoprazole  Injectable 40 milliGRAM(s) IV Push daily  sildenafil (REVATIO) 20 milliGRAM(s) Oral every 8 hours  spironolactone 25 milliGRAM(s) Oral daily    MEDICATIONS  (PRN):  aluminum hydroxide/magnesium hydroxide/simethicone Suspension 30 milliLiter(s) Oral every 6 hours PRN Dyspepsia    Pertinent Labs:  07-14 Na129 mmol/L<L> Glu 97 mg/dL K+ 3.8 mmol/L Cr  0.81 mg/dL BUN 22 mg/dL 07-14 Phos 4.3 mg/dL 07-14 Alb 2.4 g/dL<L>      Skin: intact    Estimated Needs:   no change since previous assessment      Recommend to resume PO nutrition when medically able       Monitoring and Evaluation:  PO intake , Tolerance to diet prescription , weights & follow up per protocol

## 2020-07-14 NOTE — PROGRESS NOTE ADULT - SUBJECTIVE AND OBJECTIVE BOX
Chief Complaint:  Patient is a 29y old  Male who presents with a chief complaint of Acute decompensated heart failure (2020 15:12)      Interval Events: Underwent thoracentesis yesterday, tolerated procedure well. No acute events overnight, continues on milrinone assisted diuresis, good UOP, plan for cardiac cath w/ hepatic vein wedge pressures today. No new complaints this morning     Allergies:  No Known Allergies      Hospital Medications:  aluminum hydroxide/magnesium hydroxide/simethicone Suspension 30 milliLiter(s) Oral every 6 hours PRN  buMETAnide Injectable 2 milliGRAM(s) IV Push every 8 hours  chlorhexidine 4% Liquid 1 Application(s) Topical <User Schedule>  digoxin     Tablet 0.125 milliGRAM(s) Oral daily  ferrous    sulfate 325 milliGRAM(s) Oral daily  melatonin 3 milliGRAM(s) Oral at bedtime  milrinone Infusion 0.125 MICROgram(s)/kG/Min IV Continuous <Continuous>  pantoprazole  Injectable 40 milliGRAM(s) IV Push daily  sildenafil (REVATIO) 20 milliGRAM(s) Oral every 8 hours  spironolactone 25 milliGRAM(s) Oral daily        PHYSICAL EXAM:   Vital Signs:  Vital Signs Last 24 Hrs  T(C): 35.8 (2020 12:00), Max: 37.2 (2020 00:00)  T(F): 96.5 (2020 12:00), Max: 99 (2020 00:00)  HR: 76 (2020 12:00) (67 - 82)  BP: 101/71 (2020 12:00) (91/50 - 110/52)  BP(mean): 79 (2020 12:00) (58 - 83)  RR: 22 (2020 12:00) (20 - 262)  SpO2: 92% (2020 12:00) (89% - 96%)  Daily     Daily Weight in k.8 (2020 00:00)    GENERAL:  No acute distress  HEENT:  Normocephalic/atraumtic,  no scleral icterus  CHEST:  Clear to auscultation bilaterally, no wheezes/rales/ronchi, no accessory muscle use  HEART:  Regular rate and rhythm, no murmurs/rubs/gallops  ABDOMEN:  Soft, non-tender, distended. RLQ para site c/d/i  : R scrotal swelling, nontender, reducible into abdomen, L groin inguinal hernia  EXTREMITIES:  No cyanosis, clubbing, or edema  SKIN:  No rash/erythema/ecchymoses/petechiae/wounds/abscess/warm/dry  NEURO:  Alert and oriented x 3, no asterixis, no tremor      LABS:                        9.9    7.48  )-----------( 247      ( 2020 04:30 )             30.9     Mean Cell Volume: 84.4 fL (20 @ 04:30)    -    129<L>  |  95<L>  |  22  ----------------------------<  97  3.8   |  24  |  0.81    Ca    7.9<L>      2020 04:30  Phos  4.3       Mg     1.8         TPro  4.9<L>  /  Alb  2.4<L>  /  TBili  0.4  /  DBili  x   /  AST  21  /  ALT  16  /  AlkPhos  155<H>      LIVER FUNCTIONS - ( 2020 04:30 )  Alb: 2.4 g/dL / Pro: 4.9 g/dL / ALK PHOS: 155 u/L / ALT: 16 u/L / AST: 21 u/L / GGT: x           PT/INR - ( 2020 04:30 )   PT: 13.5 SEC;   INR: 1.18          PTT - ( 2020 04:30 )  PTT:29.7 SEC      Ascites fluid:  Cell count: 13K RBC, 1522 WBC, 83%N    Ferritin: 254  TIBC: 205  IgG 797  IgA 113  IgM 3.4  Alpha-1 AT: 270  Ceruloplasm 25  Anti SM:neg  AMA: neg  ALKMneg    HAV: IgG positive  HBV: immune  HCV: neg      Imaging:  CT A/P (20)  Small to moderate volume ascites with mild peritoneal enhancement, compatible with known peritonitis. No free air.  Large left pleural effusion.

## 2020-07-14 NOTE — PROCEDURE NOTE - GENERAL INDICATIONS
For hemodynamic of Fontan circuit Cardiac cath for hemodynamics HLHS s/p Fontan with ascites and pleural effusion c/w failing Fontan physiology for pre-transplant hemodynamic evaluation

## 2020-07-14 NOTE — PROGRESS NOTE ADULT - ASSESSMENT
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  Now admitted for acute on chronic decompensated heart failure and worsening ascites in the setting of Fontan associated liver disease.      #Failing Fontan- multifactorial: pump dysfunction, AV valve regurgitation, high pressures. Diuresing well to date.  - continue current meds including IV bumex and milrinone.   - plan for diagnostic cath on Tuesday (as per prior discussions).  If there is concern for active GI bleeding, will need to discuss timing  - continue sildenafil 20 mg TID  - accept saturations > 85% on room air  - would tap left lung effusion prior to cath  - ongoing discussion and eventual referral to congenital transplant center (heart +/- liver)    #Anemia. Slow drift downwards, s/p 2 units pRBCs. FOBT positive on iron.  - d/w GI thoughts about how to best evaluate bleeding given prior history/interventions  - trend H/H  - goal Hgb > 10 prior to procedure  - would expect Hgb at least 15 given his baseline cyanosis    #Fontan associated liver disease.  - appreciate hepatology input and IR input  - transjugular biopsy would be the best route given concerns for SBP; in this anatomy: the SVC --> extracardiac Fontan baffle --> IVC --> hepatics.     We have also discussed the possibility of a thoracentesis today of the left lung in order to optimize hemodyanmic profile during catheterization.  MICU team will perform an ultrasound to see if they can address the fluid accumulation.  On physical exam today, the left lower lobe does not sound to be expanded and percussion suggests there is still significant fluid present.    In discussing the plans with the patient, I told him the plan is to get the information tomorrow that will allow us to make a plan for potential transplantation.  He will be getting an echocardiogram this morning and depending on his cardiac function, we will try to switch him to oral medication in order to follow him as an outpatient.  If that is not possible, then we will have to await the result from the liver biopsy and determine if there is a center we can transfer him to that can perform a combined heart/liver transplant. 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  Now admitted for acute on chronic decompensated heart failure and worsening ascites in the setting of Fontan associated liver disease.      #Failing Fontan- multifactorial: pump dysfunction, AV valve regurgitation, high pressures. Diuresing well to date.  - continue current meds including IV bumex and milrinone.   - Diagnostic cath today along with liver Biopsy.   - continue sildenafil 20 mg TID  - accept saturations > 85% on room air  - ongoing discussion and eventual referral to congenital transplant center (heart +/- liver)    #Anemia. Slow drift downwards, s/p 2 units pRBCs. FOBT positive on iron.  - Endoscopy during the prior admission shows angioectasis which are most likely the source of bleeding   - trend H/H  - Repeat CBC after the procedure  - would expect Hgb at least 15 given his baseline cyanosis    #Fontan associated liver disease.  - appreciate hepatology input and IR input  - transjugular biopsy would be the best route given concerns for SBP; in this anatomy: the SVC --> extracardiac Fontan baffle --> IVC --> hepatics. 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  Now admitted for acute on chronic decompensated heart failure and worsening ascites in the setting of Fontan associated liver disease.      #Failing Fontan- multifactorial: pump dysfunction, AV valve regurgitation, high pressures. Diuresing well to date.  - continue current meds including IV bumex and milrinone.   - Diagnostic cath today along with liver Biopsy.   - continue sildenafil 20 mg TID  - accept saturations > 85% on room air  - ongoing discussion and eventual referral to congenital transplant center (heart +/- liver)  - Eventually will transition to oral heart failure meds and monitor. Will have to monitor if patient tolerates coming off Milrinone as cardiac function remains poor    #Anemia. Slow drift downwards, s/p 2 units pRBCs. FOBT positive on iron.  - Endoscopy during the prior admission shows angioectasis which are most likely the source of bleeding   - trend H/H  - Repeat CBC after the procedure.   - would expect Hgb at least 15 given his baseline cyanosis    #Fontan associated liver disease.  - appreciate hepatology input and IR input  - transjugular biopsy would be the best route given concerns for SBP; in this anatomy: the SVC --> extracardiac Fontan baffle --> IVC --> hepatics.

## 2020-07-14 NOTE — PROGRESS NOTE ADULT - SUBJECTIVE AND OBJECTIVE BOX
Priyank Iona  PGY1 | Internal Medicine  [id number]/ [phone]    INTERVAL HPI/OVERNIGHT EVENTS:  Patient reported feeling pain in his lower back last night. Location of pain was different from the thoracentesis site. Patient was given tylenol with no relief. He was eventually given a lidocaine patch which helped a little. Patient also reported increased LE swelling in left leg and some swelling in lower back. No other complaints. Patient is going for [facility] today.     SUBJECTIVE: Patient seen and examined at bedside.     CONSTITUTIONAL: No weakness, fevers or chills  EYES/ENT: No visual changes;  No vertigo or throat pain   NECK: No pain or stiffness  RESPIRATORY: No cough, wheezing, hemoptysis; No shortness of breath  CARDIOVASCULAR: No chest pain or palpitations  GASTROINTESTINAL: No abdominal or epigastric pain. No nausea, vomiting, or hematemesis; No diarrhea or constipation. No melena or hematochezia.  GENITOURINARY: No dysuria, frequency or hematuria  NEUROLOGICAL: No numbness or weakness  SKIN: No itching, rashes    OBJECTIVE:    VITAL SIGNS:  ICU Vital Signs Last 24 Hrs  T(C): 35.8 (14 Jul 2020 12:00), Max: 37.2 (14 Jul 2020 00:00)  T(F): 96.5 (14 Jul 2020 12:00), Max: 99 (14 Jul 2020 00:00)  HR: 76 (14 Jul 2020 12:00) (67 - 82)  BP: 101/71 (14 Jul 2020 12:00) (91/50 - 110/52)  BP(mean): 79 (14 Jul 2020 12:00) (58 - 83)  ABP: --  ABP(mean): --  RR: 22 (14 Jul 2020 12:00) (20 - 262)  SpO2: 92% (14 Jul 2020 12:00) (89% - 96%)        07-13 @ 07:01  -  07-14 @ 07:00  --------------------------------------------------------  IN: 1022.4 mL / OUT: 4200 mL / NET: -3177.6 mL    07-14 @ 07:01  -  07-14 @ 15:13  --------------------------------------------------------  IN: 10.4 mL / OUT: 350 mL / NET: -339.6 mL      CAPILLARY BLOOD GLUCOSE          PHYSICAL EXAM:    General: NAD  HEENT: NC/AT; PERRL, clear conjunctiva  Neck: supple  Respiratory: CTA b/l  Cardiovascular: +S1/S2; RRR  Abdomen: soft, NT/ND; +BS x4  Extremities: WWP, 2+ peripheral pulses b/l; no LE edema  Skin: normal color and turgor; no rash  Neurological: AAOx3, no neurological deficits     MEDICATIONS:  MEDICATIONS  (STANDING):  buMETAnide Injectable 2 milliGRAM(s) IV Push every 8 hours  chlorhexidine 4% Liquid 1 Application(s) Topical <User Schedule>  digoxin     Tablet 0.125 milliGRAM(s) Oral daily  ferrous    sulfate 325 milliGRAM(s) Oral daily  melatonin 3 milliGRAM(s) Oral at bedtime  milrinone Infusion 0.125 MICROgram(s)/kG/Min (2.6 mL/Hr) IV Continuous <Continuous>  pantoprazole  Injectable 40 milliGRAM(s) IV Push daily  sildenafil (REVATIO) 20 milliGRAM(s) Oral every 8 hours  spironolactone 25 milliGRAM(s) Oral daily    MEDICATIONS  (PRN):  aluminum hydroxide/magnesium hydroxide/simethicone Suspension 30 milliLiter(s) Oral every 6 hours PRN Dyspepsia      ALLERGIES:  Allergies    No Known Allergies    Intolerances        LABS:                        9.9    7.48  )-----------( 247      ( 14 Jul 2020 04:30 )             30.9     07-14    129<L>  |  95<L>  |  22  ----------------------------<  97  3.8   |  24  |  0.81    Ca    7.9<L>      14 Jul 2020 04:30  Phos  4.3     07-14  Mg     1.8     07-14    TPro  4.9<L>  /  Alb  2.4<L>  /  TBili  0.4  /  DBili  x   /  AST  21  /  ALT  16  /  AlkPhos  155<H>  07-14    PT/INR - ( 14 Jul 2020 04:30 )   PT: 13.5 SEC;   INR: 1.18          PTT - ( 14 Jul 2020 04:30 )  PTT:29.7 SEC      RADIOLOGY & ADDITIONAL TESTS: Reviewed. Priyank Iona  PGY1 | Internal Medicine  [id number]/ [phone]    INTERVAL HPI/OVERNIGHT EVENTS:  Patient reported feeling pain in his lower back last night. Location of pain was different from the thoracentesis site. Patient was given tylenol with no relief. He was eventually given a lidocaine patch which helped a little. Patient also reported increased LE swelling in left leg and some swelling in lower back. No other complaints. Patient is going for [facility] today. No longer doing liver bx.     SUBJECTIVE: Patient seen and examined at bedside.     CONSTITUTIONAL: No weakness, fevers or chills  EYES/ENT: No visual changes;  No vertigo or throat pain   NECK: No pain or stiffness  RESPIRATORY: No cough, wheezing, hemoptysis; No shortness of breath  CARDIOVASCULAR: No chest pain or palpitations  GASTROINTESTINAL: No abdominal or epigastric pain. No nausea, vomiting, or hematemesis; No diarrhea or constipation. No melena or hematochezia.  GENITOURINARY: No dysuria, frequency or hematuria  NEUROLOGICAL: No numbness or weakness  SKIN: No itching, rashes    OBJECTIVE:    VITAL SIGNS:  ICU Vital Signs Last 24 Hrs  T(C): 35.8 (14 Jul 2020 12:00), Max: 37.2 (14 Jul 2020 00:00)  T(F): 96.5 (14 Jul 2020 12:00), Max: 99 (14 Jul 2020 00:00)  HR: 76 (14 Jul 2020 12:00) (67 - 82)  BP: 101/71 (14 Jul 2020 12:00) (91/50 - 110/52)  BP(mean): 79 (14 Jul 2020 12:00) (58 - 83)  ABP: --  ABP(mean): --  RR: 22 (14 Jul 2020 12:00) (20 - 262)  SpO2: 92% (14 Jul 2020 12:00) (89% - 96%)        07-13 @ 07:01  -  07-14 @ 07:00  --------------------------------------------------------  IN: 1022.4 mL / OUT: 4200 mL / NET: -3177.6 mL    07-14 @ 07:01  -  07-14 @ 15:13  --------------------------------------------------------  IN: 10.4 mL / OUT: 350 mL / NET: -339.6 mL      CAPILLARY BLOOD GLUCOSE          PHYSICAL EXAM:    General: NAD  HEENT: NC/AT; PERRL, clear conjunctiva  Neck: supple  Respiratory: CTA b/l  Cardiovascular: +S1/S2; RRR  Abdomen: soft, NT/ND; +BS x4  Extremities: WWP, 2+ peripheral pulses b/l; no LE edema  Skin: normal color and turgor; no rash  Neurological: AAOx3, no neurological deficits     MEDICATIONS:  MEDICATIONS  (STANDING):  buMETAnide Injectable 2 milliGRAM(s) IV Push every 8 hours  chlorhexidine 4% Liquid 1 Application(s) Topical <User Schedule>  digoxin     Tablet 0.125 milliGRAM(s) Oral daily  ferrous    sulfate 325 milliGRAM(s) Oral daily  melatonin 3 milliGRAM(s) Oral at bedtime  milrinone Infusion 0.125 MICROgram(s)/kG/Min (2.6 mL/Hr) IV Continuous <Continuous>  pantoprazole  Injectable 40 milliGRAM(s) IV Push daily  sildenafil (REVATIO) 20 milliGRAM(s) Oral every 8 hours  spironolactone 25 milliGRAM(s) Oral daily    MEDICATIONS  (PRN):  aluminum hydroxide/magnesium hydroxide/simethicone Suspension 30 milliLiter(s) Oral every 6 hours PRN Dyspepsia      ALLERGIES:  Allergies    No Known Allergies    Intolerances        LABS:                        9.9    7.48  )-----------( 247      ( 14 Jul 2020 04:30 )             30.9     07-14    129<L>  |  95<L>  |  22  ----------------------------<  97  3.8   |  24  |  0.81    Ca    7.9<L>      14 Jul 2020 04:30  Phos  4.3     07-14  Mg     1.8     07-14    TPro  4.9<L>  /  Alb  2.4<L>  /  TBili  0.4  /  DBili  x   /  AST  21  /  ALT  16  /  AlkPhos  155<H>  07-14    PT/INR - ( 14 Jul 2020 04:30 )   PT: 13.5 SEC;   INR: 1.18          PTT - ( 14 Jul 2020 04:30 )  PTT:29.7 SEC      RADIOLOGY & ADDITIONAL TESTS: Reviewed.

## 2020-07-15 LAB
ALBUMIN SERPL ELPH-MCNC: 2.5 G/DL — LOW (ref 3.3–5)
ALP SERPL-CCNC: 169 U/L — HIGH (ref 40–120)
ALT FLD-CCNC: 15 U/L — SIGNIFICANT CHANGE UP (ref 4–41)
ANION GAP SERPL CALC-SCNC: 10 MMO/L — SIGNIFICANT CHANGE UP (ref 7–14)
AST SERPL-CCNC: 13 U/L — SIGNIFICANT CHANGE UP (ref 4–40)
BASOPHILS # BLD AUTO: 0.06 K/UL — SIGNIFICANT CHANGE UP (ref 0–0.2)
BASOPHILS NFR BLD AUTO: 0.8 % — SIGNIFICANT CHANGE UP (ref 0–2)
BILIRUB SERPL-MCNC: 0.3 MG/DL — SIGNIFICANT CHANGE UP (ref 0.2–1.2)
BUN SERPL-MCNC: 20 MG/DL — SIGNIFICANT CHANGE UP (ref 7–23)
CALCIUM SERPL-MCNC: 7.9 MG/DL — LOW (ref 8.4–10.5)
CHLORIDE SERPL-SCNC: 94 MMOL/L — LOW (ref 98–107)
CO2 SERPL-SCNC: 25 MMOL/L — SIGNIFICANT CHANGE UP (ref 22–31)
CREAT SERPL-MCNC: 0.79 MG/DL — SIGNIFICANT CHANGE UP (ref 0.5–1.3)
EOSINOPHIL # BLD AUTO: 0.08 K/UL — SIGNIFICANT CHANGE UP (ref 0–0.5)
EOSINOPHIL NFR BLD AUTO: 1 % — SIGNIFICANT CHANGE UP (ref 0–6)
GLUCOSE SERPL-MCNC: 92 MG/DL — SIGNIFICANT CHANGE UP (ref 70–99)
HCT VFR BLD CALC: 29.6 % — LOW (ref 39–50)
HGB BLD-MCNC: 9.5 G/DL — LOW (ref 13–17)
IMM GRANULOCYTES NFR BLD AUTO: 0.8 % — SIGNIFICANT CHANGE UP (ref 0–1.5)
LYMPHOCYTES # BLD AUTO: 0.41 K/UL — LOW (ref 1–3.3)
LYMPHOCYTES # BLD AUTO: 5.2 % — LOW (ref 13–44)
MAGNESIUM SERPL-MCNC: 1.9 MG/DL — SIGNIFICANT CHANGE UP (ref 1.6–2.6)
MCHC RBC-ENTMCNC: 26.5 PG — LOW (ref 27–34)
MCHC RBC-ENTMCNC: 32.1 % — SIGNIFICANT CHANGE UP (ref 32–36)
MCV RBC AUTO: 82.7 FL — SIGNIFICANT CHANGE UP (ref 80–100)
MONOCYTES # BLD AUTO: 0.69 K/UL — SIGNIFICANT CHANGE UP (ref 0–0.9)
MONOCYTES NFR BLD AUTO: 8.7 % — SIGNIFICANT CHANGE UP (ref 2–14)
NEUTROPHILS # BLD AUTO: 6.64 K/UL — SIGNIFICANT CHANGE UP (ref 1.8–7.4)
NEUTROPHILS NFR BLD AUTO: 83.5 % — HIGH (ref 43–77)
NRBC # FLD: 0 K/UL — SIGNIFICANT CHANGE UP (ref 0–0)
PHOSPHATE SERPL-MCNC: 4.1 MG/DL — SIGNIFICANT CHANGE UP (ref 2.5–4.5)
PLATELET # BLD AUTO: 234 K/UL — SIGNIFICANT CHANGE UP (ref 150–400)
PMV BLD: 9.6 FL — SIGNIFICANT CHANGE UP (ref 7–13)
POTASSIUM SERPL-MCNC: 3.8 MMOL/L — SIGNIFICANT CHANGE UP (ref 3.5–5.3)
POTASSIUM SERPL-SCNC: 3.8 MMOL/L — SIGNIFICANT CHANGE UP (ref 3.5–5.3)
PROT SERPL-MCNC: 4.8 G/DL — LOW (ref 6–8.3)
RBC # BLD: 3.58 M/UL — LOW (ref 4.2–5.8)
RBC # FLD: 18.5 % — HIGH (ref 10.3–14.5)
SODIUM SERPL-SCNC: 129 MMOL/L — LOW (ref 135–145)
WBC # BLD: 7.94 K/UL — SIGNIFICANT CHANGE UP (ref 3.8–10.5)
WBC # FLD AUTO: 7.94 K/UL — SIGNIFICANT CHANGE UP (ref 3.8–10.5)

## 2020-07-15 PROCEDURE — 99232 SBSQ HOSP IP/OBS MODERATE 35: CPT | Mod: GC

## 2020-07-15 PROCEDURE — 99291 CRITICAL CARE FIRST HOUR: CPT

## 2020-07-15 RX ORDER — HEPARIN SODIUM 5000 [USP'U]/ML
5000 INJECTION INTRAVENOUS; SUBCUTANEOUS EVERY 8 HOURS
Refills: 0 | Status: DISCONTINUED | OUTPATIENT
Start: 2020-07-15 | End: 2020-07-16

## 2020-07-15 RX ORDER — BUMETANIDE 0.25 MG/ML
2 INJECTION INTRAMUSCULAR; INTRAVENOUS
Refills: 0 | Status: DISCONTINUED | OUTPATIENT
Start: 2020-07-15 | End: 2020-07-16

## 2020-07-15 RX ORDER — DIGOXIN 250 MCG
0.25 TABLET ORAL DAILY
Refills: 0 | Status: DISCONTINUED | OUTPATIENT
Start: 2020-07-15 | End: 2020-07-16

## 2020-07-15 RX ORDER — BUMETANIDE 0.25 MG/ML
2 INJECTION INTRAMUSCULAR; INTRAVENOUS
Refills: 0 | Status: DISCONTINUED | OUTPATIENT
Start: 2020-07-15 | End: 2020-07-15

## 2020-07-15 RX ORDER — LIDOCAINE 4 G/100G
1 CREAM TOPICAL ONCE
Refills: 0 | Status: COMPLETED | OUTPATIENT
Start: 2020-07-15 | End: 2020-07-15

## 2020-07-15 RX ORDER — FUROSEMIDE 40 MG
40 TABLET ORAL DAILY
Refills: 0 | Status: DISCONTINUED | OUTPATIENT
Start: 2020-07-15 | End: 2020-07-15

## 2020-07-15 RX ORDER — DIGOXIN 250 MCG
0.12 TABLET ORAL ONCE
Refills: 0 | Status: COMPLETED | OUTPATIENT
Start: 2020-07-15 | End: 2020-07-15

## 2020-07-15 RX ORDER — FUROSEMIDE 40 MG
40 TABLET ORAL EVERY 12 HOURS
Refills: 0 | Status: DISCONTINUED | OUTPATIENT
Start: 2020-07-15 | End: 2020-07-16

## 2020-07-15 RX ORDER — PANTOPRAZOLE SODIUM 20 MG/1
40 TABLET, DELAYED RELEASE ORAL DAILY
Refills: 0 | Status: DISCONTINUED | OUTPATIENT
Start: 2020-07-15 | End: 2020-07-16

## 2020-07-15 RX ADMIN — LIDOCAINE 1 PATCH: 4 CREAM TOPICAL at 08:13

## 2020-07-15 RX ADMIN — BUMETANIDE 2 MILLIGRAM(S): 0.25 INJECTION INTRAMUSCULAR; INTRAVENOUS at 20:00

## 2020-07-15 RX ADMIN — Medication 0.12 MILLIGRAM(S): at 09:33

## 2020-07-15 RX ADMIN — LIDOCAINE 1 PATCH: 4 CREAM TOPICAL at 15:42

## 2020-07-15 RX ADMIN — LIDOCAINE 1 PATCH: 4 CREAM TOPICAL at 01:37

## 2020-07-15 RX ADMIN — HEPARIN SODIUM 5000 UNIT(S): 5000 INJECTION INTRAVENOUS; SUBCUTANEOUS at 22:16

## 2020-07-15 RX ADMIN — BUMETANIDE 2 MILLIGRAM(S): 0.25 INJECTION INTRAMUSCULAR; INTRAVENOUS at 15:07

## 2020-07-15 RX ADMIN — Medication 40 MILLIGRAM(S): at 18:09

## 2020-07-15 NOTE — PROGRESS NOTE ADULT - SUBJECTIVE AND OBJECTIVE BOX
Chief Complaint:  Patient is a 29y old  Male who presents with a chief complaint of Acute decompensated heart failure (15 Jul 2020 12:08)      Interval Events: No acute events overnight. Patient s/p cath yesterday with no significant pressure gradient. Denies abd pain, n/v. Reports no increased abd distension.     Allergies:  No Known Allergies      Hospital Medications:  aluminum hydroxide/magnesium hydroxide/simethicone Suspension 30 milliLiter(s) Oral every 6 hours PRN  buMETAnide 2 milliGRAM(s) Oral <User Schedule>  chlorhexidine 4% Liquid 1 Application(s) Topical <User Schedule>  digoxin     Tablet 0.25 milliGRAM(s) Oral daily  ferrous    sulfate 325 milliGRAM(s) Oral daily  furosemide    Tablet 40 milliGRAM(s) Oral every 12 hours  melatonin 3 milliGRAM(s) Oral at bedtime  pantoprazole    Tablet 40 milliGRAM(s) Oral daily  sildenafil (REVATIO) 20 milliGRAM(s) Oral every 8 hours  spironolactone 25 milliGRAM(s) Oral daily      PMHX/PSHX:  Anxiety  Melena  Anemia  HLHS (hypoplastic left heart syndrome)  H/O cardiac catheterization  Status post bidirectional Jutsice operation  S/P Renea operation  S/P Fontan procedure      Family history:  Family history of neoplasm of uncertain behavior of connective and other soft tissue      ROS:     General:  No weight loss, fevers, chills, night sweats, fatigue   Eyes:  No vision changes  ENT:  No sore throat, pain, runny nose  CV:  No chest pain, palpitations, dizziness   Resp:  No SOB, cough, wheezing  GI:  See HPI  :  No burning with urination, hematuria  Muscle:  No pain, weakness  Neuro:  No weakness/tingling, memory problems  Psych:  No fatigue, insomnia, mood problems, depression  Heme:  No easy bruisability  Skin:  No rash, edema      PHYSICAL EXAM:     GENERAL:  Well developed, no distress  HEENT:  NC/AT,  conjunctivae clear, sclera anicteric  CHEST:  Full & symmetric excursion, no increased effort w/ respirations  HEART:  Regular rhythm & rate  ABDOMEN:  Soft, non-tender, distended  EXTREMITIES:  no LE  edema  SKIN:  No rash/erythema/ecchymoses/petechiae/wounds/jaundice  NEURO:  Alert, oriented    Vital Signs:  Vital Signs Last 24 Hrs  T(C): 36.9 (15 Jul 2020 12:00), Max: 36.9 (15 Jul 2020 12:00)  T(F): 98.4 (15 Jul 2020 12:00), Max: 98.4 (15 Jul 2020 12:00)  HR: 70 (15 Jul 2020 13:56) (70 - 77)  BP: 122/55 (15 Jul 2020 13:56) (101/71 - 122/55)  BP(mean): 73 (15 Jul 2020 13:56) (59 - 79)  RR: 26 (15 Jul 2020 13:56) (22 - 30)  SpO2: 96% (15 Jul 2020 13:56) (91% - 96%)  Daily     Daily Weight in k (15 Jul 2020 00:00)    LABS:                        9.5    7.94  )-----------( 234      ( 15 Jul 2020 03:30 )             29.6     07-15    129<L>  |  94<L>  |  20  ----------------------------<  92  3.8   |  25  |  0.79    Ca    7.9<L>      15 Jul 2020 03:30  Phos  4.1     07-15  Mg     1.9     07-15    TPro  4.8<L>  /  Alb  2.5<L>  /  TBili  0.3  /  DBili  x   /  AST  13  /  ALT  15  /  AlkPhos  169<H>  07-15    LIVER FUNCTIONS - ( 15 Jul 2020 03:30 )  Alb: 2.5 g/dL / Pro: 4.8 g/dL / ALK PHOS: 169 u/L / ALT: 15 u/L / AST: 13 u/L / GGT: x           PT/INR - ( 2020 04:30 )   PT: 13.5 SEC;   INR: 1.18          PTT - ( 2020 04:30 )  PTT:29.7 SEC        Imaging:    CATH     Pressures (mmHg): SVC & Fontan of 12-13 w no signifi gradient to branch PAs. L/RPCW=RVEDp 8-9mmHg. PVR <2 WUm2. Hepait venous wedge 15-16 mmHg.  Angiogram: Unobstructed Justice (mostly to larger RPA) & tortuous lateral-tunnel Fontan w diffusely small LPA (10mm vs RPA 15mm delilah). Nl levophase.  No obvious APCs, unobstructed Renea with hypoplastic AscAo filling nl coronaries.  A&P: 30 yo M w HLHS s/p lateral tunnel Fontan admitted with ascites & pleural effusion(s) that have been tapped & now s/p cardiac cath with unremarkable Fontan hemodynamics (m 12-13 mmHg) despite failing Fontan physiology on milrinone.

## 2020-07-15 NOTE — PROGRESS NOTE ADULT - SUBJECTIVE AND OBJECTIVE BOX
Priyank Iona  PGY1 | Internal Medicine  [id number]/ [phone]    INTERVAL HPI/OVERNIGHT EVENTS:  No events overnight. Patient tolerated procedure well. No pain at the femoral insertion site. No bleeding, hematoma, or bruising. Patient noted improvement of pain in back.     SUBJECTIVE: Patient seen and examined at bedside.     CONSTITUTIONAL: No weakness, fevers or chills  EYES/ENT: No visual changes;  No vertigo or throat pain   NECK: No pain or stiffness  RESPIRATORY: No cough, wheezing, hemoptysis; No shortness of breath  CARDIOVASCULAR: No chest pain or palpitations  GASTROINTESTINAL: No abdominal or epigastric pain. No nausea, vomiting, or hematemesis; No diarrhea or constipation. No melena or hematochezia.  GENITOURINARY: No dysuria, frequency or hematuria  NEUROLOGICAL: No numbness or weakness  SKIN: No itching, rashes    OBJECTIVE:    VITAL SIGNS:  ICU Vital Signs Last 24 Hrs  T(C): 36.8 (15 Jul 2020 08:00), Max: 36.8 (15 Jul 2020 08:00)  T(F): 98.3 (15 Jul 2020 08:00), Max: 98.3 (15 Jul 2020 08:00)  HR: 71 (15 Jul 2020 08:00) (70 - 77)  BP: 119/65 (15 Jul 2020 08:00) (99/65 - 119/65)  BP(mean): 70 (15 Jul 2020 08:00) (63 - 79)  ABP: --  ABP(mean): --  RR: 26 (15 Jul 2020 08:00) (14 - 30)  SpO2: 94% (15 Jul 2020 08:00) (91% - 95%)        07-14 @ 07:01  -  07-15 @ 07:00  --------------------------------------------------------  IN: 59.8 mL / OUT: 1350 mL / NET: -1290.2 mL    07-15 @ 07:01  -  07-15 @ 12:08  --------------------------------------------------------  IN: 5.2 mL / OUT: 900 mL / NET: -894.8 mL      CAPILLARY BLOOD GLUCOSE          PHYSICAL EXAM:    General: NAD  HEENT: NC/AT; PERRL, clear conjunctiva  Neck: supple  Respiratory: CTA b/l  Cardiovascular: +S1/S2; Wenckebach rhythmn   Abdomen: soft, NT/ND; +BS x4  Extremities: WWP, 2+ peripheral pulses b/l; no LE edema  Skin: normal color and turgor; no rash  Neurological: AAOx3, no neurological deficits     MEDICATIONS:  MEDICATIONS  (STANDING):  buMETAnide 2 milliGRAM(s) Oral <User Schedule>  chlorhexidine 4% Liquid 1 Application(s) Topical <User Schedule>  digoxin     Tablet 0.25 milliGRAM(s) Oral daily  ferrous    sulfate 325 milliGRAM(s) Oral daily  furosemide    Tablet 40 milliGRAM(s) Oral every 12 hours  melatonin 3 milliGRAM(s) Oral at bedtime  milrinone Infusion 0.125 MICROgram(s)/kG/Min (2.6 mL/Hr) IV Continuous <Continuous>  pantoprazole  Injectable 40 milliGRAM(s) IV Push daily  sildenafil (REVATIO) 20 milliGRAM(s) Oral every 8 hours  spironolactone 25 milliGRAM(s) Oral daily    MEDICATIONS  (PRN):  aluminum hydroxide/magnesium hydroxide/simethicone Suspension 30 milliLiter(s) Oral every 6 hours PRN Dyspepsia      ALLERGIES:  Allergies    No Known Allergies    Intolerances        LABS:                        9.5    7.94  )-----------( 234      ( 15 Jul 2020 03:30 )             29.6     07-15    129<L>  |  94<L>  |  20  ----------------------------<  92  3.8   |  25  |  0.79    Ca    7.9<L>      15 Jul 2020 03:30  Phos  4.1     07-15  Mg     1.9     07-15    TPro  4.8<L>  /  Alb  2.5<L>  /  TBili  0.3  /  DBili  x   /  AST  13  /  ALT  15  /  AlkPhos  169<H>  07-15    PT/INR - ( 14 Jul 2020 04:30 )   PT: 13.5 SEC;   INR: 1.18          PTT - ( 14 Jul 2020 04:30 )  PTT:29.7 SEC      RADIOLOGY & ADDITIONAL TESTS: Reviewed.

## 2020-07-15 NOTE — PROGRESS NOTE ADULT - REASON FOR ADMISSION
Acute decompensated heart failure

## 2020-07-15 NOTE — PROGRESS NOTE ADULT - PROVIDER SPECIALTY LIST ADULT
Cardiology
Hepatology
MICU
Urology
Hepatology
Hepatology
Cardiology

## 2020-07-15 NOTE — PROGRESS NOTE ADULT - ASSESSMENT
30 yo M w/ PMHx hypoplastic L heart s/p Fontan procedure, GIB (1/2020) presenting w/ acute on chronic congestive heart failure and worsening ascites, hepatology consulted for ascites management. LVP showed SBP, s/p treatment, improved abd exam.    # volume overload, responding to diuresis with bumetanide and low-dose spironolactone, milrinone-assisted  # Fontan-associated liver disease (FALD) with cirrhosis - liver is very nodular on CT scan and he has signs of portal hypertension with some abdominal varices. Spleen borderline large. Etiology is thought to be chronic hepatic congestion; however, he needs workup for other possible causes.  # ascites, too small to repeat paracentesis - Etiology of patient's worsening ascites is either cardiac or cirrhotic, but it is difficult to distinguish reliably. SAAG changed from >1.1 to <1.1 within 1 day around time of the paracentesis due to dropping serum albumin  # peritonitis with negative culture - likely SBP    # mild hyponatremia, possibly due to cirrhosis or diuresis  # hypoalbuminemia, likely due to cirrhosis    # positive occult blood - not hemodynamically significant, Hg stable, can be followed up as outpt. H/o GIB (2/2020) which demonstrated duodenitis     Regarding the etiology of his ascites:  - his high ascites protein and the good response of his volume overload to a "cardiac" regimen of diuretics argue for cardiac ascites. However, only one study is available that evaluated the diagnostic role of ascites protein.   - the presence of SBP argues for cirrhosis as etiology, as it is uncommon in other ascites.    There is extremely limited literature on specific features of FALD that could guide a decision for cardiac transplant vs. combined heart-liver transplant. This was discussed with the patient and mother at bedside. Cath without significant liver pressure gradient which can be an expected finding in FALD.     Recommendations:  - s/p CTX x 5d - d/c on cipro 500mg daily for SBP ppx  - f/u serology labs:  alpha-1 antitrypsin (phenotype), AFP, HBsAg, HBsAb, HBcAb, HCV Ab, MICHELLE, ASMA, IgG, ferritin, TIBC panel, AMA, ceruloplasmin  - defer liver biopsy at this time - patient to follow up at [place] for further evaluation, discussed with Dr Mary Lezama PGY-4  Gastroenterology Fellow  Pager #[phone] or [phone]  Please page on-call Fellow on weekends/after 5 pm on weekdays 28 yo M w/ PMHx hypoplastic L heart s/p Fontan procedure, GIB (1/2020) presenting w/ acute on chronic congestive heart failure and worsening ascites, hepatology consulted for ascites management. LVP showed SBP, s/p treatment, improved abd exam.    # volume overload, responding to diuresis with bumetanide and low-dose spironolactone, milrinone-assisted  # Fontan-associated liver disease (FALD) with cirrhosis - liver is very nodular on CT scan and he has signs of portal hypertension with some abdominal varices. Spleen borderline large. Etiology is thought to be chronic hepatic congestion; however, he needs workup for other possible causes.  # ascites, too small to repeat paracentesis - Etiology of patient's worsening ascites is either cardiac or cirrhotic, but it is difficult to distinguish reliably. SAAG changed from >1.1 to <1.1 within 1 day around time of the paracentesis due to dropping serum albumin.     HVPG measurement on 7/14 showed low transhepatic pressure (HVPG) of 3 mmHg - SVC/Fontan 12-13 mmHg, wedged hepatic venous pressure 15-16 mmHg.      According to Norm et al., J Cardiol 2019 this is typical even in cases of advanced fibrosis/cirrhosis and suspected due to intrahepatic veno-venous shunting.  # peritonitis with negative culture - likely SBP. SAAG changed due to changing albumin during 3d around paracentesis. Ascites protein was high - 3.6 g/dL    # mild hyponatremia, possibly due to cirrhosis or diuresis  # hypoalbuminemia, likely due to cirrhosis    # positive occult blood - not hemodynamically significant, Hg stable, can be followed up as outpt. H/o GIB (2/2020) which demonstrated duodenitis     Regarding the etiology of his ascites:  - his high ascites protein and the good response of his volume overload to a "cardiac" regimen of diuretics argue for cardiac ascites. However, only one study is available that evaluated the diagnostic role of ascites protein.   - the presence of SBP argues for cirrhosis as etiology, as it is uncommon in other ascites.    There is extremely limited literature on specific features of FALD that could guide a decision for cardiac transplant vs. combined heart-liver transplant. This was discussed with the patient and mother at bedside.    Recommendations:  - s/p CTX x 5d - d/c on cipro 500mg daily for secondary SBP ppx  - f/u serology labs:  alpha-1 antitrypsin (phenotype), AFP, HBsAg, HBsAb, HBcAb, HCV Ab, MICHELLE, ASMA, IgG, ferritin, TIBC panel, AMA, ceruloplasmin  - defer liver biopsy at this time - patient to follow up at [place] for further evaluation, discussed with Dr Mary Lezama PGY-4  Gastroenterology Fellow  Pager #[phone] or [phone]  Please page on-call Fellow on weekends/after 5 pm on weekdays

## 2020-07-15 NOTE — PROGRESS NOTE PEDS - ASSESSMENT
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  Now admitted for acute on chronic decompensated heart failure and worsening ascites in the setting of Fontan associated liver disease.  Diagnostic cath yesterday showed XXXXXXXXX    #Failing Fontan- multifactorial: pump dysfunction, AV valve regurgitation, high pressures. Diuresing well to date.  - continue current meds including IV bumex and milrinone.   - Diagnostic cath today along with liver Biopsy.   - continue sildenafil 20 mg TID  - accept saturations > 85% on room air  - ongoing discussion and eventual referral to congenital transplant center (heart +/- liver)  - Eventually will transition to oral heart failure meds and monitor. Will have to monitor if patient tolerates coming off Milrinone as cardiac function remains poor    #Anemia. Slow drift downwards, s/p 2 units pRBCs. FOBT positive on iron.  - Endoscopy during the prior admission shows angioectasis which are most likely the source of bleeding   - trend H/H  - Repeat CBC after the procedure.   - would expect Hgb at least 15 given his baseline cyanosis    #Fontan associated liver disease.  - appreciate hepatology input and IR input  - transjugular biopsy would be the best route given concerns for SBP; in this anatomy: the SVC --> extracardiac Fontan baffle --> IVC --> hepatics. 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  Now admitted for acute on chronic decompensated heart failure and worsening ascites in the setting of Fontan associated liver disease.  Diagnostic cath yesterday showed unremarkable fontan hemodynamic.    #Failing Fontan- multifactorial: pump dysfunction, AV valve regurgitation, high pressures. Diuresing well to date.  - Will switch IV medication to PO today.  - Discontinue IV bumex and start PO bumex 2mg TID  - Discontinue IV milrinone drip and increase digoxin to 0.25mg QD  - start lasix 40mg PO BID   - continue sildenafil 20 mg TID  - continue aldactone 25mg PO QD  - accept saturations > 85% on room air  - ongoing discussion and eventual referral to congenital transplant center (heart +/- liver)  - Eventually will transition to oral heart failure meds and monitor. Will have to monitor if patient tolerates coming off Milrinone as cardiac function remains poor    #Anemia. Slow drift downwards, s/p 2 units pRBCs. FOBT positive on iron.  - Endoscopy during the prior admission shows angioectasis which are most likely the source of bleeding   - trend H/H  - Repeat CBC after the procedure.   - would expect Hgb at least 15 given his baseline cyanosis    #Fontan associated liver disease.  - appreciate hepatology input and IR input  - transjugular biopsy would be the best route given concerns for SBP; in this anatomy: the SVC --> extracardiac Fontan baffle --> IVC --> hepatics. 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  Now admitted for acute on chronic decompensated heart failure and worsening ascites in the setting of Fontan associated liver disease.  Diagnostic cath yesterday showed unremarkable fontan hemodynamic.    #Failing Fontan- multifactorial: pump dysfunction, AV valve regurgitation, high pressures. Diuresing well to date.  - Will switch IV medication to PO today.  - Discontinue IV bumex and start PO bumex 2mg TID  - Discontinue IV milrinone drip and increase digoxin to 0.25mg QD  - start lasix 40mg PO BID   - continue sildenafil 20 mg TID  - continue aldactone 25mg PO QD  - accept saturations > 85% on room air  - ongoing discussion and eventual referral to congenital transplant center (heart +/- liver)  - Will monitor overnight off milirinone.  Monitor for signs of ascites re-accumulation and      #Anemia. stable H/H, slight downtrending john  - Endoscopy during the prior admission shows angioectasis which are most likely the source of bleeding   - trend H/H  - Repeat CBC after the procedure.   - would expect Hgb at least 15 given his baseline cyanosis    #Fontan associated liver disease.  - appreciate hepatology input and IR input  - transjugular biopsy would be the best route given concerns for SBP; in this anatomy: the SVC --> extracardiac Fontan baffle --> IVC --> hepatics. 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  Now admitted for acute on chronic decompensated heart failure and worsening ascites in the setting of Fontan associated liver disease.  Diagnostic cath yesterday showed unremarkable fontan hemodynamic.    #Failing Fontan- multifactorial: pump dysfunction, AV valve regurgitation, high pressures. Diuresing well to date.  - Will switch IV medication to PO today.  - Discontinue IV bumex and start PO bumex 2mg TID  - Discontinue IV milrinone drip and increase digoxin to 0.25mg QD  - start lasix 40mg PO BID   - continue sildenafil 20 mg TID  - continue aldactone 25mg PO QD  - accept saturations > 85% on room air  - ongoing discussion and eventual referral to congenital transplant center (heart +/- liver)  - Will monitor overnight off milirinone.  Monitor for signs of ascites re-accumulation   - Repeat BNP with tomorrow AM labs    #Anemia. stable H/H, slight downtrending.  - Continue with iron supplementation  - Endoscopy during the prior admission shows angioectasis which are most likely the source of bleeding   - trend H/H  - Repeat CBC tomorrow AM    #Fontan associated liver disease.  - appreciate hepatology input and IR input  - Continue to monitor for ascites, peripheral edema    #Protein-losing enteropathy  - Please obtain STOOL anti-alpha1 antitrypsin level.

## 2020-07-15 NOTE — PROGRESS NOTE ADULT - ATTENDING COMMENTS
1.Acute systolic chf.  Hypoplastic LV S/P Fontan procedure.  .Continue Milrinone and diuretics.  Cardiac cath  shows normal Fontan physiology. For outpatient eval for cardiac transplant. D/C Milrinone. Increase diuretics and digoxin as per cariology.  2 Liver abnormalities seen on CT. Hold off on bx and EGD.  Pt will need outpatient work up for Cardiac and Liver transplant.

## 2020-07-15 NOTE — PROGRESS NOTE PEDS - SUBJECTIVE AND OBJECTIVE BOX
Adult Congenital Heart Disease  Follow up Note    Sub: Yesterday, diagnostic cath was performed which showed Fontan pressure of 12-13mmHg, RVEDP of 8-9 mmHg, hepatic wedge pressure of 15-16mmHg, PVR of XXXX and unobstructed Justice.    No acute overnight event.  Patient reports that his pain in right thoracostomy site much improved.  No pain over the right femoral cath site.      Obj:  ICU Vital Signs Last 24 Hrs  T(C): 36.8 (15 Jul 2020 08:00), Max: 36.8 (15 Jul 2020 08:00)  T(F): 98.3 (15 Jul 2020 08:00), Max: 98.3 (15 Jul 2020 08:00)  HR: 71 (15 Jul 2020 08:00) (70 - 77)  BP: 119/65 (15 Jul 2020 08:00) (99/65 - 119/65)  BP(mean): 70 (15 Jul 2020 08:00) (63 - 79)  ABP: --  ABP(mean): --  RR: 26 (15 Jul 2020 08:00) (14 - 30)  SpO2: 94% (15 Jul 2020 08:00) (91% - 95%)      07-14 @ 07:01  -  15 @ 07:00  --------------------------------------------------------  IN: 59.8 mL / OUT: 1350 mL / NET: -1290.2 mL      Ambulating on his own, sitting down in a chair comfortable, no distress  mmm, cyanotic  s1 single s2, 2/6 HSM at LLSB to apex  diminished on Left lower lung fields  abdomen soft, + ascites  chronic venous stasis, varicosities to legs  1+ pedal/ankle edema  right femoral cath site dressing clean, dry, and intact.               LABORATORY TESTS:                          9.5  CBC:   7.94 )-----------( 234   (07-15-20 @ 03:30)                          29.6               129   |  94    |  20                 Ca: 7.9    BMP:   ----------------------------< 92     M.9   (07-15-20 @ 03:30)             3.8    |  25    | 0.79               Ph: 4.1      LFT:     TPro: 4.8 / Alb: 2.5 / TBili: 0.3 / DBili: x / AST: 13 / ALT: 15 / AlkPhos: 169   (07-15-20 @ 03:30)    COAG: PT: 13.5 / PTT: 29.7 / INR: 1.18   (20 @ 04:30)     CARDIAC MARKERS:             High-Sensitivity Troponin: 11   (20 @ 06:00)             CK: x / CKMB: x   (20 @ 06:00)             Pro-BNP: x   (20 @ 06:00)  CARDIAC MARKERS:             High-Sensitivity Troponin: 11   (20 @ 15:57)             CK: x / CKMB: x   (20 @ 15:57)             Pro-BNP: 772.7   (20 @ 15:57)    telemetry: sinus rhythm, wenkebach    MEDICATIONS  (STANDING):  buMETAnide Injectable 2 milliGRAM(s) IV Push every 8 hours  chlorhexidine 4% Liquid 1 Application(s) Topical <User Schedule>  digoxin     Tablet 0.25 milliGRAM(s) Oral daily  ferrous    sulfate 325 milliGRAM(s) Oral daily  furosemide    Tablet 40 milliGRAM(s) Oral daily  melatonin 3 milliGRAM(s) Oral at bedtime  milrinone Infusion 0.125 MICROgram(s)/kG/Min (2.6 mL/Hr) IV Continuous <Continuous>  pantoprazole  Injectable 40 milliGRAM(s) IV Push daily  sildenafil (REVATIO) 20 milliGRAM(s) Oral every 8 hours  spironolactone 25 milliGRAM(s) Oral daily    MEDICATIONS  (PRN):  aluminum hydroxide/magnesium hydroxide/simethicone Suspension 30 milliLiter(s) Oral every 6 hours PRN Dyspepsia Adult Congenital Heart Disease  Follow up Note    Sub: Yesterday, diagnostic cath was performed which showed Fontan pressure of 12-13mmHg, RVEDP of 8-9 mmHg, hepatic wedge pressure of 15-16mmHg, PVR of <2 WUm2 and unobstructed Justice.    No acute overnight event.  Patient reports that his pain in right thoracostomy site much improved.  No pain over the right femoral cath site.      Obj:  ICU Vital Signs Last 24 Hrs  T(C): 36.8 (15 Jul 2020 08:00), Max: 36.8 (15 Jul 2020 08:00)  T(F): 98.3 (15 Jul 2020 08:00), Max: 98.3 (15 Jul 2020 08:00)  HR: 71 (15 Jul 2020 08:00) (70 - 77)  BP: 119/65 (15 Jul 2020 08:00) (99/65 - 119/65)  BP(mean): 70 (15 Jul 2020 08:00) (63 - 79)  ABP: --  ABP(mean): --  RR: 26 (15 Jul 2020 08:00) (14 - 30)  SpO2: 94% (15 Jul 2020 08:00) (91% - 95%)      07-14 @ 07:01  -  15 @ 07:00  --------------------------------------------------------  IN: 59.8 mL / OUT: 1350 mL / NET: -1290.2 mL      Ambulating on his own, sitting down in a chair comfortable, no distress  mmm, cyanotic  s1 single s2, 2/6 HSM at LLSB to apex  diminished on Left lower lung fields  abdomen soft, + ascites  chronic venous stasis, varicosities to legs  1+ pedal/ankle edema  right femoral cath site dressing clean, dry, and intact.               LABORATORY TESTS:                          9.5  CBC:   7.94 )-----------( 234   (07-15-20 @ 03:30)                          29.6               129   |  94    |  20                 Ca: 7.9    BMP:   ----------------------------< 92     M.9   (07-15-20 @ 03:30)             3.8    |  25    | 0.79               Ph: 4.1      LFT:     TPro: 4.8 / Alb: 2.5 / TBili: 0.3 / DBili: x / AST: 13 / ALT: 15 / AlkPhos: 169   (07-15-20 @ 03:30)    COAG: PT: 13.5 / PTT: 29.7 / INR: 1.18   (20 @ 04:30)     CARDIAC MARKERS:             High-Sensitivity Troponin: 11   (20 @ 06:00)             CK: x / CKMB: x   (20 @ 06:00)             Pro-BNP: x   (20 @ 06:00)  CARDIAC MARKERS:             High-Sensitivity Troponin: 11   (20 @ 15:57)             CK: x / CKMB: x   (20 @ 15:57)             Pro-BNP: 772.7   (20 @ 15:57)    telemetry: sinus rhythm, wenkebach    MEDICATIONS  (STANDING):  buMETAnide Injectable 2 milliGRAM(s) IV Push every 8 hours  chlorhexidine 4% Liquid 1 Application(s) Topical <User Schedule>  digoxin     Tablet 0.25 milliGRAM(s) Oral daily  ferrous    sulfate 325 milliGRAM(s) Oral daily  furosemide    Tablet 40 milliGRAM(s) Oral daily  melatonin 3 milliGRAM(s) Oral at bedtime  milrinone Infusion 0.125 MICROgram(s)/kG/Min (2.6 mL/Hr) IV Continuous <Continuous>  pantoprazole  Injectable 40 milliGRAM(s) IV Push daily  sildenafil (REVATIO) 20 milliGRAM(s) Oral every 8 hours  spironolactone 25 milliGRAM(s) Oral daily    MEDICATIONS  (PRN):  aluminum hydroxide/magnesium hydroxide/simethicone Suspension 30 milliLiter(s) Oral every 6 hours PRN Dyspepsia Adult Congenital Heart Disease  Follow up Note    Sub: Yesterday, diagnostic cath was performed which showed Fontan pressure of 12-13mmHg, RVEDP of 8-9 mmHg, hepatic wedge pressure of 15-16mmHg, PVR of <2 WUm2 and unobstructed Justice.    No acute overnight event.  Patient reports that his pain in right thoracostomy site much improved.  No pain over the right femoral cath site.      Obj:  ICU Vital Signs Last 24 Hrs  T(C): 36.8 (15 Jul 2020 08:00), Max: 36.8 (15 Jul 2020 08:00)  T(F): 98.3 (15 Jul 2020 08:00), Max: 98.3 (15 Jul 2020 08:00)  HR: 71 (15 Jul 2020 08:00) (70 - 77)  BP: 119/65 (15 Jul 2020 08:00) (99/65 - 119/65)  BP(mean): 70 (15 Jul 2020 08:00) (63 - 79)  ABP: --  ABP(mean): --  RR: 26 (15 Jul 2020 08:00) (14 - 30)  SpO2: 94% (15 Jul 2020 08:00) (91% - 95%)      07-14 @ 07:01  -  07-15 @ 07:00  --------------------------------------------------------  IN: 59.8 mL / OUT: 1350 mL / NET: -1290.2 mL      Ambulating on his own, sitting down in a chair comfortable, no distress  mmm, cyanotic  s1 single s2, 2/6 HSM at LLSB to apex  diminished on Left lower lung fields  abdomen soft, + ascites  chronic venous stasis, varicosities to legs  1+ pedal/ankle edema  right femoral cath insertion site dressing clean, dry, and intact.               LABORATORY TESTS:                          9.5  CBC:   7.94 )-----------( 234   (07-15-20 @ 03:30)                          29.6               129   |  94    |  20                 Ca: 7.9    BMP:   ----------------------------< 92     M.9   (07-15-20 @ 03:30)             3.8    |  25    | 0.79               Ph: 4.1      LFT:     TPro: 4.8 / Alb: 2.5 / TBili: 0.3 / DBili: x / AST: 13 / ALT: 15 / AlkPhos: 169   (07-15-20 @ 03:30)    COAG: PT: 13.5 / PTT: 29.7 / INR: 1.18   (20 @ 04:30)     CARDIAC MARKERS:             High-Sensitivity Troponin: 11   (20 @ 06:00)             CK: x / CKMB: x   (20 @ 06:00)             Pro-BNP: x   (20 @ 06:00)  CARDIAC MARKERS:             High-Sensitivity Troponin: 11   (20 @ 15:57)             CK: x / CKMB: x   (20 @ 15:57)             Pro-BNP: 772.7   (20 @ 15:57)    telemetry: sinus rhythm, wenkebach    MEDICATIONS  (STANDING):  buMETAnide Injectable 2 milliGRAM(s) IV Push every 8 hours  chlorhexidine 4% Liquid 1 Application(s) Topical <User Schedule>  digoxin     Tablet 0.25 milliGRAM(s) Oral daily  ferrous    sulfate 325 milliGRAM(s) Oral daily  furosemide    Tablet 40 milliGRAM(s) Oral daily  melatonin 3 milliGRAM(s) Oral at bedtime  milrinone Infusion 0.125 MICROgram(s)/kG/Min (2.6 mL/Hr) IV Continuous <Continuous>  pantoprazole  Injectable 40 milliGRAM(s) IV Push daily  sildenafil (REVATIO) 20 milliGRAM(s) Oral every 8 hours  spironolactone 25 milliGRAM(s) Oral daily    MEDICATIONS  (PRN):  aluminum hydroxide/magnesium hydroxide/simethicone Suspension 30 milliLiter(s) Oral every 6 hours PRN Dyspepsia

## 2020-07-15 NOTE — PROGRESS NOTE ADULT - ASSESSMENT
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  Sent from clinic for IV diuresis and milrinone therapy.      #Neuro   - No acute issues, AOx3    #Cardiovascular  Acute Decompensated Heart Failure   - switching IVs to PO meds today   - Discontinued IV bumex and started PO bumex 2 mg TID   - Discontinued IV milrinone and increased digoxin to 0.25 mg QD   - start lasix 40mg PO BID   - continue sildenafil 20mg TID   - continue aldactone 25 mg PO QD   - vascular checks q6 at femoral site   - monitor vitals and for signs of ascites reaccumulation    - check BNP with AM labs  Sinus Arrhythmia   - Patient has baseline Wenkeboch rhythm, telemetry consistent with this   - Peds Cards aware    #Respiratory   - Patient baseline sat 88%, accept O2 sat >85%   - Currently sat at 94% on room air   - Comfortable lying flat, subjectively asymptomatic from respiratory perspective  - Thoracentesis performed 7/13. Sample was bloody, cell count 317, RBC count 69069, seg count 27, lymphocytes 52, monocytes 21, no organisms.    #Gastrointestinal  Spontaneous bacterial peritonitis   - Peritoneal fluid analysis demonstrates PMNs 1211, gram stain with PMNs no organism seen.  Diagnostic for SBP.  Patient remains asymptomatic, no fevers, no WBC elevation, no abdominal pain   - Ceftriaxone 2g q24, 5 day course ending 7/12   - No viable fluid pocket for repeat paracentesis  Gastritis   - Continue home protonix   - History of GI bleed   - Patient notes dark stools since restarting iron, Hgb remains stable   - FOBT positive 7/11, with slow drop in anemia now s/p 2u pRBCs, goal hgb >10 per peds cards. Hgb 10.0 today, 7/13.  Congestive Hepatopathy   - IR no longer performing liver bx  - No longer planned to do endoscopy this admission since previous endoscopy showed angioectasis which may be the likely source of bleeding   Protein losing enteropathy   - obtain Stool anti-alpha1 antitrypsin level    #Renal/   - Continue diuresis with PO Bumex, lasix, and aldactone   - Baseline Cr appears to be ~0.8  Hydrocele   - Right hydrocele & Left varicocele   - Per urology, increased abdominal fluid and pressure likely the etiology behind the hydrocele development, and it is likely to improve after these issues are addressed; nothing additional to do   - GC pending    #Infectious Disease  Spontaneous Bacterial Peritonitis   - as above, ceftriaxone 2g q24 ending 7/12.    - No viable pocket for repeat para    #Endo   - No acute issues    #Heme  Anemia   - trend H/H   - Continue to get CBC q12 to monitor, and have active type and screen. Transfuse to Hb>10   - Anemia labs indicate no hemolysis, adequate folate and B12, and slight iron deficiency.  Will continue iron supplementation   - CBC q12 to monitor

## 2020-07-16 ENCOUNTER — TRANSCRIPTION ENCOUNTER (OUTPATIENT)
Age: 30
End: 2020-07-16

## 2020-07-16 VITALS — RESPIRATION RATE: 20 BRPM | SYSTOLIC BLOOD PRESSURE: 105 MMHG | DIASTOLIC BLOOD PRESSURE: 63 MMHG | HEART RATE: 81 BPM

## 2020-07-16 LAB
A1AT PHENOTYP SERPL-IMP: SIGNIFICANT CHANGE UP BANDS
A1AT SERPL-MCNC: 120 MG/DL — SIGNIFICANT CHANGE UP (ref 100–190)
BASOPHILS # BLD AUTO: 0.06 K/UL — SIGNIFICANT CHANGE UP (ref 0–0.2)
BASOPHILS NFR BLD AUTO: 0.8 % — SIGNIFICANT CHANGE UP (ref 0–2)
EOSINOPHIL # BLD AUTO: 0.1 K/UL — SIGNIFICANT CHANGE UP (ref 0–0.5)
EOSINOPHIL NFR BLD AUTO: 1.4 % — SIGNIFICANT CHANGE UP (ref 0–6)
HCT VFR BLD CALC: 31.4 % — LOW (ref 39–50)
HGB BLD-MCNC: 9.8 G/DL — LOW (ref 13–17)
IMM GRANULOCYTES NFR BLD AUTO: 0.8 % — SIGNIFICANT CHANGE UP (ref 0–1.5)
LYMPHOCYTES # BLD AUTO: 0.37 K/UL — LOW (ref 1–3.3)
LYMPHOCYTES # BLD AUTO: 5.1 % — LOW (ref 13–44)
MCHC RBC-ENTMCNC: 26.3 PG — LOW (ref 27–34)
MCHC RBC-ENTMCNC: 31.2 % — LOW (ref 32–36)
MCV RBC AUTO: 84.4 FL — SIGNIFICANT CHANGE UP (ref 80–100)
MONOCYTES # BLD AUTO: 0.62 K/UL — SIGNIFICANT CHANGE UP (ref 0–0.9)
MONOCYTES NFR BLD AUTO: 8.6 % — SIGNIFICANT CHANGE UP (ref 2–14)
NEUTROPHILS # BLD AUTO: 5.99 K/UL — SIGNIFICANT CHANGE UP (ref 1.8–7.4)
NEUTROPHILS NFR BLD AUTO: 83.3 % — HIGH (ref 43–77)
NRBC # FLD: 0 K/UL — SIGNIFICANT CHANGE UP (ref 0–0)
NT-PROBNP SERPL-SCNC: 1242 PG/ML — SIGNIFICANT CHANGE UP
PH FLD: 7.9 PH — SIGNIFICANT CHANGE UP
PLATELET # BLD AUTO: 238 K/UL — SIGNIFICANT CHANGE UP (ref 150–400)
PMV BLD: 10 FL — SIGNIFICANT CHANGE UP (ref 7–13)
RBC # BLD: 3.72 M/UL — LOW (ref 4.2–5.8)
RBC # FLD: 18.8 % — HIGH (ref 10.3–14.5)
WBC # BLD: 7.2 K/UL — SIGNIFICANT CHANGE UP (ref 3.8–10.5)
WBC # FLD AUTO: 7.2 K/UL — SIGNIFICANT CHANGE UP (ref 3.8–10.5)

## 2020-07-16 PROCEDURE — 99239 HOSP IP/OBS DSCHRG MGMT >30: CPT

## 2020-07-16 RX ORDER — DIGOXIN 250 MCG
1 TABLET ORAL
Qty: 30 | Refills: 3
Start: 2020-07-16 | End: 2020-11-12

## 2020-07-16 RX ORDER — DIGOXIN 0.25 MG/1
250 TABLET ORAL
Refills: 0 | Status: ACTIVE | COMMUNITY

## 2020-07-16 RX ORDER — PANTOPRAZOLE 40 MG/1
40 TABLET, DELAYED RELEASE ORAL DAILY
Qty: 30 | Refills: 2 | Status: ACTIVE | COMMUNITY
Start: 2020-03-06

## 2020-07-16 RX ORDER — FUROSEMIDE 40 MG
1 TABLET ORAL
Qty: 0 | Refills: 0 | DISCHARGE

## 2020-07-16 RX ORDER — DIGOXIN 250 MCG
1 TABLET ORAL
Qty: 0 | Refills: 0 | DISCHARGE

## 2020-07-16 RX ORDER — LANOLIN ALCOHOL/MO/W.PET/CERES
1 CREAM (GRAM) TOPICAL
Qty: 30 | Refills: 0
Start: 2020-07-16 | End: 2020-08-14

## 2020-07-16 RX ORDER — FUROSEMIDE 40 MG
1 TABLET ORAL
Qty: 60 | Refills: 2
Start: 2020-07-16 | End: 2020-10-13

## 2020-07-16 RX ORDER — PANTOPRAZOLE SODIUM 20 MG/1
1 TABLET, DELAYED RELEASE ORAL
Qty: 0 | Refills: 0 | DISCHARGE

## 2020-07-16 RX ORDER — PANTOPRAZOLE SODIUM 20 MG/1
1 TABLET, DELAYED RELEASE ORAL
Qty: 30 | Refills: 3
Start: 2020-07-16 | End: 2020-11-12

## 2020-07-16 RX ORDER — ALPRAZOLAM 0.25 MG
0 TABLET ORAL
Qty: 0 | Refills: 0 | DISCHARGE

## 2020-07-16 RX ORDER — CIPROFLOXACIN LACTATE 400MG/40ML
1 VIAL (ML) INTRAVENOUS
Qty: 60 | Refills: 1
Start: 2020-07-16 | End: 2020-09-13

## 2020-07-16 RX ORDER — POLYETHYLENE GLYCOL-3350 AND ELECTROLYTES 236; 6.74; 5.86; 2.97; 22.74 G/274.31G; G/274.31G; G/274.31G; G/274.31G; G/274.31G
236 POWDER, FOR SOLUTION ORAL
Qty: 4000 | Refills: 0 | Status: DISCONTINUED | COMMUNITY
Start: 2019-09-13 | End: 2020-07-16

## 2020-07-16 RX ORDER — BUMETANIDE 0.25 MG/ML
1 INJECTION INTRAMUSCULAR; INTRAVENOUS
Qty: 90 | Refills: 3
Start: 2020-07-16 | End: 2020-11-12

## 2020-07-16 RX ORDER — SPIRONOLACTONE 25 MG/1
0 TABLET, FILM COATED ORAL
Qty: 0 | Refills: 0 | DISCHARGE

## 2020-07-16 RX ORDER — SPIRONOLACTONE 25 MG/1
1 TABLET, FILM COATED ORAL
Qty: 30 | Refills: 3
Start: 2020-07-16 | End: 2020-11-12

## 2020-07-16 RX ORDER — LISINOPRIL 2.5 MG/1
1 TABLET ORAL
Qty: 0 | Refills: 0 | DISCHARGE

## 2020-07-16 RX ORDER — LISINOPRIL 2.5 MG/1
2.5 TABLET ORAL DAILY
Refills: 0 | Status: DISCONTINUED | COMMUNITY
End: 2020-07-16

## 2020-07-16 RX ORDER — SILDENAFIL CITRATE 20 MG/1
20 TABLET, FILM COATED ORAL EVERY 8 HOURS
Refills: 0 | Status: ACTIVE | COMMUNITY
Start: 2020-07-16

## 2020-07-16 RX ORDER — FERROUS SULFATE 325(65) MG
1 TABLET ORAL
Qty: 30 | Refills: 3
Start: 2020-07-16 | End: 2020-11-12

## 2020-07-16 RX ADMIN — Medication 40 MILLIGRAM(S): at 05:16

## 2020-07-16 RX ADMIN — Medication 0.25 MILLIGRAM(S): at 05:17

## 2020-07-16 RX ADMIN — BUMETANIDE 2 MILLIGRAM(S): 0.25 INJECTION INTRAMUSCULAR; INTRAVENOUS at 05:16

## 2020-07-16 RX ADMIN — HEPARIN SODIUM 5000 UNIT(S): 5000 INJECTION INTRAVENOUS; SUBCUTANEOUS at 05:17

## 2020-07-16 RX ADMIN — PANTOPRAZOLE SODIUM 40 MILLIGRAM(S): 20 TABLET, DELAYED RELEASE ORAL at 11:03

## 2020-07-16 NOTE — DISCHARGE NOTE PROVIDER - NSDCMRMEDTOKEN_GEN_ALL_CORE_FT
bumetanide 2 mg oral tablet: 1 tab(s) orally 3 times a day   ciprofloxacin 500 mg oral tablet: 1 tab(s) orally 2 times a day   digoxin 250 mcg (0.25 mg) oral tablet: 1 tab(s) orally once a day  ferrous sulfate 325 mg (65 mg elemental iron) oral tablet: 1 tab(s) orally once a day  furosemide 40 mg oral tablet: 1 tab(s) orally every 12 hours  Melatonin 3 mg oral tablet: 1 tab(s) orally once a day (at bedtime)  pantoprazole 40 mg oral delayed release tablet: 1 tab(s) orally once a day  Revatio 20 mg oral tablet: 1 tab(s) orally every 8 hours  spironolactone 25 mg oral tablet: 1 tab(s) orally once a day

## 2020-07-16 NOTE — DISCHARGE NOTE PROVIDER - HOSPITAL COURSE
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 Patient was started on a milrinone drip and was continued on Bumex, digoxin, sildenafil, and spironolactone. Paracentesis was performed and ascitic fluid removed which showed evidence of Spontaneous Bacterial Peritonitis and patient was treated with ceftriaxone. Thoracentesis was performed to removed pleural effusion prior to heart catheterization procedure. Fluid analysis showed transudative pleural effusion. Patient received heart cath procedure on 7/14/20 which showed significant pulmonary hypertension and a cardiac output of 4. Patient tolerated procedure well and clinically improved. Patient was taken off milrinone drip and was observed overnight with no issues. Patient discharged with home medications and PO ciprofloxacin for SBP prophylaxis.

## 2020-07-16 NOTE — DISCHARGE NOTE NURSING/CASE MANAGEMENT/SOCIAL WORK - PATIENT PORTAL LINK FT
You can access the FollowMyHealth Patient Portal offered by [facility] by registering at the following website: [email or web address]. By joining [facility]’s FollowMyHealth portal, you will also be able to view your health information using other applications (apps) compatible with our system.

## 2020-07-16 NOTE — PROGRESS NOTE PEDS - REASON FOR ADMISSION
Acute decompensated heart failure
Decompensated Heart Failure/Ascites

## 2020-07-16 NOTE — DISCHARGE NOTE PROVIDER - NSDCCPCAREPLAN_GEN_ALL_CORE_FT
PRINCIPAL DISCHARGE DIAGNOSIS  Diagnosis: HLHS (hypoplastic left heart syndrome)  Assessment and Plan of Treatment: You came to the hospital because of heart failure caused by your hypoplastic left heart syndrome and Fontan surgical procedure. In the hospital, we treated your condition with medications to remove excess fluids. We performed a paracentesis and a thoracentesis to remove additional fluid. You received antibiotics for an infection in your abdomen. You received a heart catheterization procedure to assess for pulmonary hypertension and heart anatomy. You clinically improved with these treatments and discharged on medications for your heart condition. At home, you should take the medications as provided in the instructions. If you have worsening shortness of breath, chest pain, or swelling in your extremities, please return to the Emergency Department.      SECONDARY DISCHARGE DIAGNOSES  Diagnosis: Liver function failure  Assessment and Plan of Treatment: You found to have decreased liver function as a result of your heart failure. We performed a paracentesis to remove fluid from your abdomen. We consulted hepatology regarding your condition and decided to treat you with medical management. No biopsy was performed. You are being discharged on Ciprofloxacin. Please continue to take this medication as instructed in order to prevent infection. Please return to Emergency Department if you have worsening swelling in your abdome, changes in your mental status, or recurring fevers.

## 2020-07-16 NOTE — DISCHARGE NOTE PROVIDER - CARE PROVIDER_API CALL
Demetrio Hernandez (MD)  Adult Congenital Heart Disease; Pediatric Cardiology; Pediatrics  [address] [place] [address], [place] [address]  Phone: [phone]  Fax: [phone]  Follow Up Time: 1 week

## 2020-07-16 NOTE — CHART NOTE - NSCHARTNOTEFT_GEN_A_CORE
PEDIATRIC CARDIOLOGY DISCHARGE NOTE    CARDIOLOGIST: Dr Hernandez   DATE OF ADMISSION: 07/06/20  DATE OF DISCHARGE: 7/16/20  -  -  -  -  -  -  -  -  -  -  -  -  -  -  -  -  -  -  -  -  -  -  -  -  -  -  -  -  -  -  -  -  -  -  -  -  CARDIAC DIAGNOSIS: Hypoplastic left heart syndrome   CARDIAC CATH DATA (07/14/20): SVC & Fontan of 12-13 with no significant gradient to branch PAs. L/RPCW=RVEDp 8-9mmHg. PVR <2 WUm2. Hepatic venous wedge 15-16 mmHg. Unobstructed Justice & tortuous lateral-tunnel Fontan w diffusely small LPA (10mm vs RPA 15mm delilah). No obvious Ap collaterals. Unobstructed [place] with hypoplastic Ascending aorta and normal filling of coronaries.  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	[place] Operation, 1990, Dr. Bennett – [facility]  2.	Bidirectional Justice Operation, 1991, Dr. Bennett – [facility]  3.	Extracardiac Fontan, 1997, Dr. Bennett – [facility]  4.	Cardiac Catheterization, 1999 – device closure of his fenestration    HISTORY OF PRESENT ILLNESS:   Arie is a 28 y/o M with history of HLH s/p extracardiac Fenestrated Fontan followed by device closure of the fenestration who [facility] for worsening ascites. He had been lost to follow up for several years. In 2019, he presented to the [facility] with signs and symptoms of congestive heart failure. At that time, he also had melena that was evaluated and no significant lesions found. He did have a microcytic anemia for which he was treated with iron supplementation. Imaging of his chest and abdomen demonstrated a fairly common liver appearance - a "nutmeg appearing" liver consistent with cirrhosis. Since then he has [facility] 6 months and he had been stable until recently when he started to develop ascites. Despite aggressive diuretic therapy with lasix, aldactone and Bumex he has continued to increase the amount of ascites and over the weekend has had increasing orthopnea requiring him be in a semi-erect position to sleep (he usually uses 2+ pillows but this is now worse).     HOSPITAL COURSE:   Cardiovascular:   Echocardiogram in the clinic on the day of admission showed severely depressed RV function with moderate TR. In the setting of worsening symptoms, admitted to MICU for acute on chronic management of heart failure and failing Fontan. He was started on Milrinone at 0.125 mcg/kg/min. He was diuresed with Bumex 2 mg IV q 8 with which he had an excellent response. Peritoneal tap was performed which drained 4 L out and patient was symptomatically improved. His other medications included Digoxin 125 mcg qd which was later increased to 250 mcg qd. In order to optimize his hemodynamics, Sildenafil 20 mg q 8 hr was added. He also underwent left thoracocentesis, which drained additional 1400 cc of pleural fluid. Cardiac cath was performed for hemodynamics assessment. Upon discharge he was transitioned to oral diurectics, Lasix 40 mg bid, 2 mg Bumex tid and Aldactone 25 mg qd. Milrinone was discontinued. He was observed for overnight after milrinone was d/gavi. He tolerated coming off the milrinone. His records will sent to [place] for discussion with heart transplant team, in the light of developing cirrhosis and poor RV function.   Rhythm: On continuous telemetry he had type 1 Mobitz block, almost all the time, and transiently had some Mobitz type 2. He was asymptomatic from this. Baseline HR were between 70-80.    Respiratory: Baseline sats are between 88-94% on RA. Sometimes required nasal cannula for comfort. On admission CXR showed a large pleural effusion which improved with diuresis. Pleural tap was performed on the left, prior to cardiac cath to optimize respiratory mechanics.   Gastrointestinal / Nutrition: Because of the worsening ascites, peritoneal tap was performed which drained 4 L of peritoneal fluid. The peritoneal fluid cell counts were suggestive of spontaneous bacterial peritonitis. Adult Hepatology was consulted and was following the patient. During the cardiac cath, hepatic venous wedge pressure measurement showed a low transhepatic pressure (HVPG) of 3 mmHg - SVC/Fontan 12-13 mmHg, wedged hepatic venous pressure 15-16 mmHg. According to Norm et al., J Cardiol 2019 this is typical even in cases of advanced fibrosis/cirrhosis and suspected due to intrahepatic veno-venous shunting. Biopsy of the liver was deferred, patient will be evaluated at [facility] for possible heart and liver transplant.   He also experienced dark stools and fecal occult blood was positive. Endoscopy was performed on 03/03/20 which showed angioectasis in the duodenum.   Low salt diet during the hospital course.   Infectious: Patient was afebrile during the hospital course. Since peritoneal fluid was suggestive of SBP he was treated with Ceftriaxone for 5 days and discharge on 500 mg Ciprofloxacin daily for secondary SBP prophylaxis.   Renal / : Cr remained as per baseline during the hospital course of 0.7.   Hematologic: He was on iron for iron deficiency anemia. He required 2 unit of pRBC transfusion for Hb of 8.5. Hb at discharge was 9.8.  Neurological: Alert and oriented during the hospital course.     -  -  -  -  -  -  -  -  -  -  -  -  -  -  -  -  -  -  -  -  -  -  -  -  -  -  -  -  -  -  -  -  -  -  -  -  PHYSICAL EXAMINATION & VITAL SIGNS:   ICU Vital Signs Last 24 Hrs  T(C): 36.2 (14 Jul 2020 08:00), Max: 37.2 (14 Jul 2020 00:00)  T(F): 97.2 (14 Jul 2020 08:00), Max: 99 (14 Jul 2020 00:00)  HR: 67 (14 Jul 2020 08:00) (67 - 82)  BP: 96/67 (14 Jul 2020 08:00) (91/50 - 110/52)  BP(mean): 58 (14 Jul 2020 04:00) (58 - 83)  RR: 20 (14 Jul 2020 08:00) (17 - 262)  SpO2: 93% (14 Jul 2020 08:00) (89% - 96%)  General - non-dysmorphic appearance, well-developed, in no distress.  Skin - no rash, no desquamation, no cyanosis.  Eyes / ENT - no conjunctival injection, sclerae anicteric, external ears & nares normal, mucous membranes moist.  Pulmonary - normal inspiratory effort, no retractions, decreased breath sounds on the left lung, no wheezes, no rales.  Cardiovascular - normal rate, regular rhythm, normal S1 & single S2, 2/6 holosystolic murmur at the LLSB and apex, no rubs, no gallops, capillary refill < 2sec, normal pulses.  Gastrointestinal - soft, mildly distended, non-tender, + hepatomegaly   Musculoskeletal - no joint swelling, no clubbing, 1+ edema.  Neurologic / Psychiatric - alert, oriented as age-appropriate, affect appropriate, moves all extremities, normal tone.  CURRENT STUDIES:   Electrocardiogram - 07/06/2020. Sinus rhythm at 92 bpm with a prolonged OK interval (230 msec). Complete Right Bundle Branch Block.    Echo- 7/7/20   Hypoplastic left heart syndrome s/p right Justice and extracardiac fenestrated Fontan, now s/p device   closure of fenestration.   Status post placement of right bidirectional Justice shunt  Status post extracardiac fenestrated Fontan conduit.  Status post device closure of Fontan fenestration.  The Justice cavo- pulmonary anastomosis is not well visualized on 2D imaging. There is low velocity   Doppler flow profile in the SVC end of the cavopulmonary anastomosis.    The IVC end of the extracardiac Fontan pathway is patent and unobstructed with low velocity, Doppler   flow profile. Patent Fontan conduit.  The branch pulmonary arteries were not adequately visualized.  The tricuspid valve leaflets appear thickened at the edges with incomplete central coaptation, likely   secondary to dilation.  Moderate tricuspid valve regurgitation.  Trivial neoaortic regurgitation.   Right ventricular hypertrophy and dilated right ventricle.  Severe global hypokinesia of the right ventricle.  No pericardial effusion.  Ascites is present.  Compared to the previous echocardiogram of 1/13/2020; there is significant worsening of the right   ventricular systolic function (qualitatively).  DISCHARGE PLAN: Discharge home     CURRENT MEDICATIONS: Lasix 40 mg bid, Bumex 2 mg tid, aldactone 25 mg qd, sildenafil 20 mg q 8, digoxin 250 mcg qd , Aspirin 81 mg qd.   Other meds: Iron 325 mg daily, pantoprazole 40 mg daily, Ciprofloxacin 500 mg daily.   CURRENT FEEDING/NUTRITION: Salt restricted diet   FOLLOW UP:   F/U with Dr Hernandez in 1 week

## 2020-07-16 NOTE — PROGRESS NOTE PEDS - ASSESSMENT
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  Now admitted for acute on chronic decompensated heart failure and worsening ascites in the setting of Fontan associated liver disease.  Diagnostic cath yesterday showed unremarkable fontan hemodynamic.    #Failing Fontan- multifactorial: pump dysfunction, AV valve regurgitation, high pressures. Diuresing well to date.  - Will switch IV medication to PO today.  - Discontinue IV bumex and start PO bumex 2mg TID  - Discontinue IV milrinone drip and increase digoxin to 0.25mg QD  - start lasix 40mg PO BID   - continue sildenafil 20 mg TID  - continue aldactone 25mg PO QD  - accept saturations > 85% on room air  - ongoing discussion and eventual referral to congenital transplant center (heart +/- liver)  - Will monitor overnight off milirinone.  Monitor for signs of ascites re-accumulation   - Repeat BNP with tomorrow AM labs    #Anemia. stable H/H, slight downtrending.  - Continue with iron supplementation  - Endoscopy during the prior admission shows angioectasis which are most likely the source of bleeding   - trend H/H  - Repeat CBC tomorrow AM    #Fontan associated liver disease.  - appreciate hepatology input and IR input  - Continue to monitor for ascites, peripheral edema    #Protein-losing enteropathy  - Please obtain STOOL anti-alpha1 antitrypsin level.

## 2020-07-16 NOTE — PROGRESS NOTE PEDS - SUBJECTIVE AND OBJECTIVE BOX
Adult Congenital Heart Disease  Follow up Note    Sub:     Obj:  ICU Vital Signs Last 24 Hrs  T(C): 36.8 (15 Jul 2020 08:00), Max: 36.8 (15 Jul 2020 08:00)  T(F): 98.3 (15 Jul 2020 08:00), Max: 98.3 (15 Jul 2020 08:00)  HR: 71 (15 Jul 2020 08:00) (70 - 77)  BP: 119/65 (15 Jul 2020 08:00) (99/65 - 119/65)  BP(mean): 70 (15 Jul 2020 08:00) (63 - 79)  RR: 26 (15 Jul 2020 08:00) (14 - 30)  SpO2: 94% (15 Jul 2020 08:00) (91% - 95%)      07-15 @ 07:01  -   @ 07:00  --------------------------------------------------------  IN: 5.2 mL / OUT: 2650 mL / NET: -2644.8 mL      Ambulating on his own, sitting down in a chair comfortable, no distress  mmm, cyanotic  s1 single s2, 2/6 HSM at LLSB to apex  diminished on Left lower lung fields  abdomen soft, + ascites  chronic venous stasis, varicosities to legs  1+ pedal/ankle edema  right femoral cath insertion site dressing clean, dry, and intact.               LABORATORY TESTS:                            9.8  CBC:   7.20 )-----------( 238   (20 @ 06:15)                          31.4               129   |  94    |  20                 Ca: 7.9    BMP:   ----------------------------< 92     M.9   (07-15-20 @ 03:30)             3.8    |  25    | 0.79               Ph: 4.1      LFT:     TPro: 4.8 / Alb: 2.5 / TBili: 0.3 / DBili: x / AST: 13 / ALT: 15 / AlkPhos: 169   (07-15-20 @ 03:30)    COAG: PT: 13.5 / PTT: 29.7 / INR: 1.18   (20 @ 04:30)      Pro-BNP: 1242   (20 @ 06:15)    VBG:   pH: 7.49 / pCO2: 37 / pO2: 39 / HCO3: 28 / Base Excess: 4.1 / SaO2: 71.5   (20 @ 15:57)      MEDICATIONS  (STANDING):  buMETAnide 2 milliGRAM(s) Oral <User Schedule>  digoxin     Tablet 0.25 milliGRAM(s) Oral daily  furosemide    Tablet 40 milliGRAM(s) Oral every 12 hours  sildenafil (REVATIO) 20 milliGRAM(s) Oral every 8 hours  spironolactone 25 milliGRAM(s) Oral daily  melatonin 3 milliGRAM(s) Oral at bedtime  pantoprazole    Tablet 40 milliGRAM(s) Oral daily  ferrous    sulfate 325 milliGRAM(s) Oral daily  heparin   Injectable 5000 Unit(s) SubCutaneous every 8 hours        MEDICATIONS  (PRN):  aluminum hydroxide/magnesium hydroxide/simethicone Suspension 30 milliLiter(s) Oral every 6 hours PRN Dyspepsia

## 2020-07-17 DIAGNOSIS — K65.9 PERITONITIS, UNSPECIFIED: ICD-10-CM

## 2020-07-17 RX ORDER — CIPROFLOXACIN HYDROCHLORIDE 500 MG/1
500 TABLET, FILM COATED ORAL
Refills: 0 | Status: ACTIVE | COMMUNITY
Start: 2020-07-17

## 2020-07-17 NOTE — PHYSICAL EXAM
[Facies] : the head and face were normal in appearance [Sclera] : the conjunctiva were normal [PERRL With Normal Accommodation] : the pupils were equal in size, round, and reactive to light [Outer Ear] : the ears and nose were normal in appearance [Nasal Cavity] : the nasal mucosa was normal [Respiration, Rhythm And Depth] : normal respiratory rhythm and effort [Auscultation Breath Sounds / Voice Sounds] : breath sounds clear to auscultation bilaterally [No Cough] : no cough [Chest Surgical / Traumatic Scar] : chest incision well healed [Stridor] : no stridor was observed [Chest Palpation Tender Sternum] : no chest wall tenderness [Sternotomy] : sternotomy [No Sternal Instability] : no sternal instability [Right Thoracotomy] : right thoracotomy [Unremarkable] : unremarkable [Displaced to Right] : displaced to the right [Hyperdynamic] : There was a hyperdynamic precordium [Regularly Irregular] : the rhythm was regularly irregular [III] : a grade 3/6   [Holosystolic] : holosystolic [LMSB] : LMSB  [Med] : medium pitched [Early] : early [Base] : the murmur was transmitted to the base [Abdomen Tenderness] : non-tender [Diminished] : diminished [Firm] : firm [Distended] : distended [Tympanitic] : tympanitic [___cm] : with a span of [unfilled] cm [Palpable___cm below the RCM] : palpable [unfilled] cm below the right costal margin [Musculoskeletal Exam: Normal Movement Of All Extremities] : normal movements of all extremities [Musculoskeletal - Swelling] : no joint swelling or joint tenderness [Nail Clubbing] : clubbing of the fingers [Cyanosis Of Fingers] : cyanosis of the fingers [Abnormal Walk] : normal gait [Motor Tone] : muscle strength and tone were normal [Toes Cyanosis] : cyanosis of the toes was noted [Cervical Lymph Nodes Enlarged Anterior] : The anterior cervical nodes were normal [Cervical Lymph Nodes Enlarged Posterior] : The posterior cervical nodes were normal [] : no rash [Demonstrated Behavior - Infant Nonreactive To Parents] : interactive [Skin Turgor] : normal turgor [Anxious] : anxious [Liver Tender To Palpation] : not tender [Spleen Tender] : not tender [FreeTextEntry1] : extensive varicosities of right lower leg (chronic) [Spleen Enlarged] : not enlarged

## 2020-07-17 NOTE — HISTORY OF PRESENT ILLNESS
[FreeTextEntry1] : We saw Arie Acevedo today for urgent evaluation. Arie is a 29 year old with HLHS who underwent the following staged repair:\par \par 1.	[place] Operation, 1990, Dr. Bennett – [facility]\par 2.	Bidirectional Justice Operation, 1991, Dr. Bennett – [facility]\par 3.	Extracardiac Fontan, 1997, Dr. Bennett – [facility]\par 4.	Cardiac Catheterization, 1999 – device closure of his fenestration \par \par Arie had presented to me in transition from the Pediatric Cardiology withe the above diagnosis.  When I first met with him, I think the transition from Dr. Huffman to me was a bit discomforting to Arie and we lost contact with him for about three years.  In 2019, he presented to the [facility] with signs and symptoms of congestive heart failure.  At that time, he also had melena that was evaluated and no significant lesions found.  He did have a microcytic anemia for which he was treated with iron supplementation.  Imaging of his chest and abdomen demonstrated a fairly common liver appearance - a "nutmeg appearing" liver consistent with cirrhosis.  \par \par We have followed him about every 6 months and he had been stable until recently when he started to develop ascites.  Despite aggressive diuretic therapy with lasix, aldactone and Bumex he has continued to increase the amount of ascites and over the weekend has had increasing orthopnea requiring him be in a semi-erect position to sleep (he usually uses 2+ pillows but this is now worse).\par \par We asked him to come to clinic today so we could evaluate him directly.\par

## 2020-07-17 NOTE — REVIEW OF SYSTEMS
[Shortness Of Breath] : expressed as feeling short of breath [Nl] : ENT [Feeling Poorly] : feeling poorly (malaise) [Cyanosis] : cyanosis [Exercise Intolerance] : persistence of exercise intolerance [Orthopnea] : orthopnea [Sleep Disturbances] : ~T sleep disturbances [Anxiety] : anxiety [Dec Urine Output] : oliguria [Diaphoresis] : not diaphoretic [Edema] : no edema [Palpitations] : no palpitations [Chest Pain] : no chest pain or discomfort [Tachypnea] : not tachypneic [Fast HR] : no tachycardia [Cough] : no cough [Wheezing] : no wheezing [Abdominal Pain] : no abdominal pain [Vomiting] : no vomiting [Diarrhea] : no diarrhea [Fainting (Syncope)] : no fainting [Decrease In Appetite] : appetite not decreased [Seizure] : no seizures [Headache] : no headache [Limping] : no limping [Dizziness] : no dizziness [Rash] : no rash [Joint Swelling] : no joint swelling [Joint Pains] : no arthralgias [Easy Bruising] : no tendency for easy bruising [Wound problems] : no wound problems [Swollen Glands] : no lymphadenopathy [Easy Bleeding] : no ~M tendency for easy bleeding [Hyperactive] : no hyperactive behavior [Depression] : no depression [Nosebleeds] : no epistaxis [Failure To Thrive] : no failure to thrive [Short Stature] : short stature was not noted [Jitteriness] : no jitteriness [Heat/Cold Intolerance] : no temperature intolerance [FreeTextEntry2] : Abdominal distention

## 2020-07-17 NOTE — DISCUSSION/SUMMARY
[FreeTextEntry1] : As noted above, Arie is a 29 year old male with hypoplastic left heart syndrome.\par His surgical history is:\par 1.	[place] Operation, 1990, Dr. Bennett – [facility]\par 2.	Bidirectional Justice Operation, 1991, Dr. Bennett – [facility]\par 3.	Extracardiac Fontan, 1997, Dr. Bennett – [facility]\par 4.	Cardiac Catheterization, 1999 – device closure of his fenestration \par \par He is now presenting with a picture of "Failing Fontan" with worsening congestive heart failure in the form of severe ascites, and significant diminution of his single ventricle function.  \par My plan is to admit him to the Medical ICU at [facility] for intravenous diuresis and milrinone therapy to improve his ventricular function while being monitored for the development of any arrhythmias.  While he is currently on aspirin and we do not see a thrombus anywhere in the heart or great vessels, it would be prudent to start him on low dose heparin to prevent any thrombus formation.\par In addition,  Dr. Fry has been alerted that we need a Hepatic evaluation and a paracentesis to relieve some of the  interference with his respirations.\par \par Ultimately, once his heart failure has improved, we will have him undergo a heart catheterization to evaluate his anatomy (rule out any obstruction to flow) and his pulmonary vascular resistance.\par \par I would stop the ACE inhibition (lisinopril) now while starting milrinone.  Continue an appropriate level of intravenous diuresis including aldactone and loop diuretics, start heparin (d/c aspirin for now), and start sildenafil adjusting to keep blood pressure within a normal range.  The intention of the sildenafil for this patient is to try and relieve any pulmonary vascular resistance that might be interfering with Fontan circuit flow.\par \par The [facility] service will continue to round on him twice daily and respond to any questions about his care.\par

## 2020-07-17 NOTE — CARDIOLOGY SUMMARY
[Today's Date] : [unfilled] [FreeTextEntry2] : Full report to follow:\par The right (single ventricle) function is severely diminished as compared to the study of 13-Jan-2020.\par There is phasic flow in the IVC to Fontan circuit and the right sided Justice shunt is not clearly defined but there does not appear to be any obstruction to flow.\par The pulmonary arteries continue to be non-visualized well. [de-identified] : The study from Jan 2020 showed sinus rhythm with a very prolonged CO interval.   Wenkebach periodicity was also noted and there were rare PVCs an couplets. [FreeTextEntry1] : Sinus rhythm at 92 bpm with a prolonged [place] interval (230 msec)\par Complete Right Bundle Branch Block

## 2020-07-17 NOTE — CONSULT LETTER
[Today's Date] : [unfilled] [Name] : Name: [unfilled] [] : : ~~ [Today's Date:] : [unfilled] [Dear  ___:] : Dear Dr. [unfilled]: [Consult] : I had the pleasure of evaluating your patient, [unfilled]. My full evaluation follows. [Sincerely,] : Sincerely, [Consult - Multiple Provider] : Thank you very much for allowing us to participate in the care of this patient. If you have any questions, please do not hesitate to contact us. [FreeTextEntry5] : [facility] [FreeTextEntry4] : Dr Aguilar [FreeTextEntry6] : [address] [de-identified] : Janelle Martinez, MSN, CPNP-AC, PC\par Pediatric Cardiology, Adult Congenital Cardiology\par Natalia Silva [facility]\par \par Demetrio Hernandez MD, JUNIE\par Medical Director, Adult Congenital Heart Program\par Professor of Pediatrics\par [employer] [facility] [employer] at [facility]\par \par  [FreeTextEntry7] : [place]  [id number]

## 2020-07-18 LAB
CULTURE RESULTS: SIGNIFICANT CHANGE UP
SPECIMEN SOURCE: SIGNIFICANT CHANGE UP

## 2020-07-22 ENCOUNTER — APPOINTMENT (OUTPATIENT)
Dept: PEDIATRIC CARDIOLOGY | Facility: CLINIC | Age: 30
End: 2020-07-22
Payer: MEDICARE

## 2020-07-22 PROCEDURE — 99214 OFFICE O/P EST MOD 30 MIN: CPT | Mod: 95

## 2020-07-22 RX ORDER — POTASSIUM CHLORIDE 1500 MG/1
20 TABLET, EXTENDED RELEASE ORAL
Qty: 30 | Refills: 5 | Status: DISCONTINUED | COMMUNITY
Start: 2019-08-17 | End: 2020-07-22

## 2020-07-22 RX ORDER — ASPIRIN 81 MG
81 TABLET, DELAYED RELEASE (ENTERIC COATED) ORAL
Refills: 0 | Status: DISCONTINUED | COMMUNITY
End: 2020-07-22

## 2020-07-22 NOTE — DISCUSSION/SUMMARY
[FreeTextEntry1] : In summary, Arie has been doing well since his discharge from the hospital. We discussed changing the timing of his diuretics so that he does not have to keep waking up during the night to urinate. We have asked him to measure his abdominal girth daily and document it. We will have him obtain a complete set of labs next week so that we may monitor his electrolytes and follow his CBC to monitor him for anemia and/or bleeding.\par \par Lastly, we discussed that we will be sending his data to Dr. Matty Hudson at [place] to begin the process of evaluation for heart transplant listing. Arie has identified that he has anxiety and so we took the opportunity to encourage him to see appropriate care with a psychiatrist or psychologist as the next few months moving forward will definitely be more stressful as he moves f=through the process of evaluation with unfamiliar team members.

## 2020-07-22 NOTE — REVIEW OF SYSTEMS
[Edema] : edema [Diaphoresis] : diaphoretic [Sleep Disturbances] : ~T sleep disturbances [Anxiety] : anxiety [Feeling Poorly] : not feeling poorly (malaise) [Cyanosis] : no cyanosis [Chest Pain] : no chest pain or discomfort [Exercise Intolerance] : no persistence of exercise intolerance [Palpitations] : no palpitations [Orthopnea] : no orthopnea [Cough] : no cough [Diarrhea] : no diarrhea [Shortness Of Breath] : not expressed as feeling short of breath [Abdominal Pain] : no abdominal pain [Fainting (Syncope)] : no fainting [Dizziness] : no dizziness

## 2020-07-22 NOTE — CONSULT LETTER
[Name] : Name: [unfilled] [Today's Date] : [unfilled] [] : : ~~ [Today's Date:] : [unfilled] [Consult - Multiple Provider] : Thank you very much for allowing us to participate in the care of this patient. If you have any questions, please do not hesitate to contact us. [Dear  ___:] : Dear Dr. [unfilled]: [Sincerely,] : Sincerely, [DrNikolai  ___] : Dr. WAYNE [FreeTextEntry4] : Dr. Aguilar [FreeTextEntry5] : [facility] [FreeTextEntry6] : [address] [de-identified] : Janelle Martinez, MSN, CPNP-AC, PC\par Pediatric Cardiology, Adult Congenital Cardiology\par Natalia Silva [facility]\par \par Demetrio Hernandez MD, JUNIE\par Medical Director, Adult Congenital Heart Program\par Professor of Pediatrics\par [employer] [facility] [employer] at [facility]\par  [FreeTextEntry7] : [place] [id number]

## 2020-07-22 NOTE — HISTORY OF PRESENT ILLNESS
[Mother] : mother [Home] : at home, [unfilled] , at the time of the visit. [Medical Office: ([facility])___] : at the medical office located in  [Verbal consent obtained from patient] : the patient, [unfilled] [Other:____] : [unfilled] [FreeTextEntry4] : Janelle Martinez, NP [FreeTextEntry1] : We had the pleasure of a Telehealth visit with Arie Acevedo today, July 22, 2020 following discharge from the hospital last week for acute on chronic heart failure. He was admitted to the MICU for medical management of his heart failure with milrinone and diuretic. He had significant ascites and underwent paracentesis which was suspicious for Spontaneous Bacterial Peritonitis. He was started on sildenafil and underwent cardiac catheterization with surprisingly good hemodynamics. His stools were positive for occult blood but we know from an endoscopy in March that he has angioectasis in the duodenum.\par \par Since his discharge he has been monitoring his weights and I & O's. His weights have remained relatively stable. His saturations have remained in the low 90's.  He reports generally feeling well. His only complaint is some diaphoresis at night when he is trying to fall asleep. He is not sure if that is anxiety and takes some Xanax or melatonin to try and help him fall asleep. He wakes frequently during the night related to the effects from his diuretics. Otherwise, from a cardiac perspective, he denies SOB, palpitations, chest pain, syncope, fever. His left leg continues to have some swelling. He describes his abdomen as retaining some fluid.

## 2020-07-30 ENCOUNTER — APPOINTMENT (OUTPATIENT)
Dept: PEDIATRIC CARDIOLOGY | Facility: CLINIC | Age: 30
End: 2020-07-30
Payer: MEDICARE

## 2020-07-30 PROCEDURE — 99214 OFFICE O/P EST MOD 30 MIN: CPT | Mod: 95

## 2020-08-01 DIAGNOSIS — R00.2 PALPITATIONS: ICD-10-CM

## 2020-08-05 NOTE — REASON FOR VISIT
[Medical Office: ([facility])___] : at the medical office located in  [Home] : at home, [unfilled] , at the time of the visit. [Verbal consent obtained from patient] : the patient, [unfilled] [Follow-Up] : a follow-up visit for [FreeTextEntry4] : Janelle Martinez, NP [Patient] : patient [Mother] : mother

## 2020-08-05 NOTE — REVIEW OF SYSTEMS
[Edema] : edema [Palpitations] : palpitations [Feeling Poorly] : not feeling poorly (malaise) [Cyanosis] : no cyanosis [Chest Pain] : no chest pain or discomfort [Diaphoresis] : not diaphoretic [Shortness Of Breath] : not expressed as feeling short of breath [Orthopnea] : no orthopnea [Limping] : no limping [Fainting (Syncope)] : no fainting [Abdominal Pain] : no abdominal pain [Anxiety] : no anxiety

## 2020-08-05 NOTE — PHYSICAL EXAM
[General Appearance - Alert] : alert [Facies] : the head and face were normal in appearance [Examination Of The Oral Cavity] : mucous membranes were moist and pink [Sclera] : the conjunctiva were normal [FreeTextEntry1] : left leg edematous up past knee, no discoloration

## 2020-08-05 NOTE — HISTORY OF PRESENT ILLNESS
[FreeTextEntry1] : We had the pleasure of a follow-up Telehealth visit today with Arie Acevedo today. 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 During his recent hospitalization he was started on Sildenafil, underwent cardiac catheterization, had a thoracentesis of a pleural effusion and paracentesis for significant ascites. He began retaining fluid again and called with complaints of increasing abdominal bloating, left leg edema, increasing weight and abdominal girth. We added metolazone and increased his Lasix. Today he reports a 5 pound weight loss overnight and a 1-inch decrease in his abdominal girth. He continues to have a significant urine output at the time of our call. His left lower extremity remains extremely swollen without discoloration or claudication.\par \par We informed him that we have sent his information to the transplant program at [place] and he communicated that he has an appointment scheduled with the [facility]/heart failure team there on Monday.

## 2020-08-05 NOTE — DISCUSSION/SUMMARY
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 He is now home following hospitalization for heart failure requiring inotropic support, diuretic therapy, SPB, paracentesis, pleural effusion requiring tap and cardiac catheterization. He had recent reaccumulation of his ascites and so his diuretics were increased and Metolazone was added with good results. \par \par We will make no changes to this current therapy. He will have labs drawn on Friday to check his electrolytes. His mother has a lymphatic pump at home and we have told him he may use that for his leg. We have asked him to follow up with us after his visit with the heart fdailure team on Monday to let us know how his visit went. We remain available should he have any questions or concerns.

## 2020-08-05 NOTE — CONSULT LETTER
[Today's Date] : [unfilled] [Name] : Name: [unfilled] [Today's Date:] : [unfilled] [] : : ~~ [Sincerely,] : Sincerely, [Consult - Multiple Provider] : Thank you very much for allowing us to participate in the care of this patient. If you have any questions, please do not hesitate to contact us. [Dear  ___:] : Dear Dr. [unfilled]: [FreeTextEntry4] : Dr. Aguilar [FreeTextEntry6] : [address] [FreeTextEntry7] : [place] [id number] [FreeTextEntry5] : [facility] [de-identified] : Janelle Martinez, MSN, CPNP-AC, PC\par Pediatric Cardiology, Adult Congenital Cardiology\par Natalia Silva [facility]\par \par Demetrio Hernandez MD, JUNIE\par Medical Director, Adult Congenital Heart Program\par Professor of Pediatrics\par [employer] [facility] [employer] at [facility]\par

## 2020-08-06 DIAGNOSIS — R05 COUGH: ICD-10-CM

## 2020-08-06 DIAGNOSIS — E87.3 ALKALOSIS: ICD-10-CM

## 2020-08-06 DIAGNOSIS — E87.6 HYPOKALEMIA: ICD-10-CM

## 2020-08-06 RX ORDER — POTASSIUM CHLORIDE 1500 MG/1
20 TABLET, EXTENDED RELEASE ORAL TWICE DAILY
Qty: 180 | Refills: 6 | Status: ACTIVE | COMMUNITY
Start: 2020-08-06 | End: 1900-01-01

## 2020-08-11 PROBLEM — R05 COUGH: Status: ACTIVE | Noted: 2020-08-11

## 2020-08-12 LAB
CULTURE RESULTS: SIGNIFICANT CHANGE UP
SPECIMEN SOURCE: SIGNIFICANT CHANGE UP

## 2020-08-13 DIAGNOSIS — E87.8 OTHER DISORDERS OF ELECTROLYTE AND FLUID BALANCE, NOT ELSEWHERE CLASSIFIED: ICD-10-CM

## 2020-08-18 RX ORDER — ACETAZOLAMIDE 500 MG/1
500 CAPSULE ORAL
Qty: 60 | Refills: 5 | Status: ACTIVE | COMMUNITY
Start: 2020-08-06 | End: 1900-01-01

## 2020-08-20 ENCOUNTER — APPOINTMENT (OUTPATIENT)
Dept: PEDIATRIC CARDIOLOGY | Facility: CLINIC | Age: 30
End: 2020-08-20
Payer: MEDICARE

## 2020-08-20 PROCEDURE — 99213 OFFICE O/P EST LOW 20 MIN: CPT | Mod: 95

## 2020-08-20 PROCEDURE — 99202 OFFICE O/P NEW SF 15 MIN: CPT | Mod: 95

## 2020-08-20 RX ORDER — SPIRONOLACTONE 25 MG/1
25 TABLET ORAL
Qty: 60 | Refills: 5 | Status: ACTIVE | COMMUNITY

## 2020-08-24 RX ORDER — BUMETANIDE 2 MG/1
2 TABLET ORAL
Refills: 0 | Status: ACTIVE | COMMUNITY
Start: 2020-06-29

## 2020-08-25 RX ORDER — METOLAZONE 2.5 MG/1
2.5 TABLET ORAL
Qty: 30 | Refills: 5 | Status: ACTIVE | COMMUNITY
Start: 2020-07-28 | End: 1900-01-01

## 2020-08-25 RX ORDER — ALPRAZOLAM 0.5 MG/1
0.5 TABLET ORAL
Qty: 3 | Refills: 0 | Status: ACTIVE | COMMUNITY
Start: 2020-03-16

## 2020-08-25 NOTE — REVIEW OF SYSTEMS
[Sleep Disturbances] : ~T sleep disturbances [Feeling Poorly] : not feeling poorly (malaise) [Cyanosis] : no cyanosis [Chest Pain] : no chest pain or discomfort [Palpitations] : no palpitations [Shortness Of Breath] : not expressed as feeling short of breath [Fainting (Syncope)] : no fainting [Vomiting] : no vomiting [Abdominal Pain] : no abdominal pain [Easy Bruising] : no tendency for easy bruising [Headache] : no headache [Easy Bleeding] : no ~M tendency for easy bleeding [Depression] : no depression [Anxiety] : no anxiety [Dec Urine Output] : no oliguria

## 2020-08-25 NOTE — REASON FOR VISIT
[Home] : at home, [unfilled] , at the time of the visit. [Medical Office: ([facility])___] : at the medical office located in  [Mother] : mother [Follow-Up] : a follow-up visit for [Verbal consent obtained from patient] : the patient, [unfilled] [FreeTextEntry4] : Janelle Martinez, NP

## 2020-08-25 NOTE — DISCUSSION/SUMMARY
[FreeTextEntry1] : In summary, Arie is a 29 year old male with HLHS, failing Fontan physiology and recent acute systolic heart failure. He recently required hospitalization and is now home on pulmonary vasodilators and diuretics. He has had no arrhythmia related issues as can sometimes happen with failing Fontan patients.\par He continues to have abdominal anasarca and requires significant diuretics though we are trying to peel back to normalize his electrolytes. We will make no changes to his medication therapy today.\par \par He will have labs drawn again on Monday and will follow up with him at that time. [Needs SBE Prophylaxis] : [unfilled] does not need bacterial endocarditis prophylaxis

## 2020-08-25 NOTE — HISTORY OF PRESENT ILLNESS
[FreeTextEntry1] : We had the pleasure of a follow-up Telehealth visit today with Arie Acevedo today. 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 During his recent hospitalization he was started on Sildenafil, underwent cardiac catheterization, had a thoracentesis of a pleural effusion and paracentesis for significant ascites. \par \par We have been balancing his diuretics while attempting to keep his electrolytes as normal as possible as we are trying to manage him as an outpatient. he knows that at some point he might need admission again for a "tune up" with milrinone, IV repletion and possible IV diuretics. Since our last visit he was also seen by Dr. Hudson, the [facility]/heart failure physician at [place] in preparation for eventual referral for heart transplant. He has an appointment with the Transplant physician, Dr. Jazmin Colindres on Monday and an appointment with their hepatologist for liver evaluation later in the month.\par \par He has a rash on his left chest surrounding his left nipple, difficult to see on this Telehealth call. It appears to be non-raised, blotchy and warm to touch according to him and his mother. It blanches when you touch it and is not itchy. He said it appeared when he was fluid overloaded then faded and has now re-appeared.\par \par From a cardiac standpoint he denies chest pain, shortness of breath or palpitations. \par \par

## 2020-08-25 NOTE — CONSULT LETTER
[Today's Date] : [unfilled] [Name] : Name: [unfilled] [] : : ~~ [Today's Date:] : [unfilled] [Dear  ___:] : Dear Dr. [unfilled]: [Consult - Multiple Provider] : Thank you very much for allowing us to participate in the care of this patient. If you have any questions, please do not hesitate to contact us. [Sincerely,] : Sincerely, [FreeTextEntry4] : Dr. Aguilar [FreeTextEntry6] : [address] [FreeTextEntry5] : [facility] [FreeTextEntry7] : [place] [id number] [de-identified] : Janelle Martinez, MSN, CPNP-AC, PC\par Pediatric Cardiology, Adult Congenital Cardiology\par Natalia Silva [facility]\par \par Demetrio Hernandez MD, JUNIE\par Medical Director, Adult Congenital Heart Program\par Professor of Pediatrics\par [employer] [facility] [employer] at [facility]\par

## 2020-09-14 ENCOUNTER — APPOINTMENT (OUTPATIENT)
Dept: PEDIATRIC CARDIOLOGY | Facility: CLINIC | Age: 30
End: 2020-09-14

## 2020-09-25 ENCOUNTER — APPOINTMENT (OUTPATIENT)
Dept: HEPATOLOGY | Facility: CLINIC | Age: 30
End: 2020-09-25

## 2020-12-13 NOTE — ASU PREOP CHECKLIST - STERILIZATION AFFIRMATION
Problem: Noninvasive Ventilation Acute  Goal: Effective Unassisted Ventilation and Oxygenation  Outcome: Ongoing, Progressing   Goal Outcome Evaluation:  Pt wore CPAP for short period of time in the night stating he was in too much pain to wear.  
Goal Outcome Evaluation:           
Goal Outcome Evaluation:   Will continue to monitor.        
n/a

## 2021-02-23 NOTE — ED PROVIDER NOTE - NS ED MD TWO NIGHTS YN
Yes
Fall Prevention for Older Adults    WHAT YOU NEED TO KNOW:    As you age, your muscles weaken and your risk for falls increases. Your risk also increases if you take medicines that make you sleepy or dizzy. You may also be at risk if you have vision or joint problems, have low blood pressure, or are not active.    DISCHARGE INSTRUCTIONS:    Call 911 or have someone else call if:   •You have fallen and are unconscious.      •You have fallen and cannot move part of your body.      Contact your healthcare provider if:   •You have fallen and have pain or a headache.      •You have questions or concerns about your condition or care.      Fall prevention tips:   •Stay active. Exercise can help strengthen your muscles and improve your balance. Your healthcare provider may recommend water aerobics, walking, or Skyler Chi. He or she may also recommend physical therapy to improve your coordination. Never start an exercise program without asking your healthcare provider first.  Water Aerobics for Seniors       Skyler Chi for Seniors           •Wear shoes that fit well and have soles that . Wear shoes both inside and outside. Use slippers with good . Avoid shoes with high heels.      •Use assistive devices as directed. Your healthcare provider may suggest that you use a cane or walker to help you keep your balance. You may need to have grab bars put in your bathroom near the toilet or in the shower.      •Stand or sit up slowly. This may help you keep your balance and prevent falls.      •Wear a personal alarm. This is a device that allows you to call 911 if you need help. Ask for more information on personal alarms.      •Manage your medical conditions.  Keep all appointments with your healthcare providers. Visit your eye doctor as directed.      Home safety tips:     Fall Prevention for Seniors     •Add items to prevent falls in the bathroom. Put nonslip strips on your bath or shower floor to prevent you from slipping. Use a bath mat if you do not have carpet in the bathroom. This will prevent you from falling when you step out of the bath or shower. Use a shower seat so you do not need to stand while you shower. Sit on the toilet or a chair in your bathroom to dry yourself and put on clothing. This will prevent you from losing your balance from drying or dressing yourself while you are standing.      •Keep paths clear. Remove books, shoes, and other objects from walkways and stairs. Place cords for telephones and lamps out of the way so that you do not need to walk over them. Tape them down if you cannot move them. Remove small rugs. If you cannot remove a rug, secure it with double-sided tape. This will prevent you from tripping.      •Install bright lights in your home. Use night lights to help light paths to the bathroom or kitchen. Always turn on the light before you start walking.      •Keep items you use often on shelves within reach. Do not use a step stool to help you reach an item.      •Paint or place reflective tape on the edges of your stairs. This will help you see the stairs better.      Follow up with your healthcare provider as directed: Write down your questions so you remember to ask them during your visits.       Contusion in Adults    WHAT YOU NEED TO KNOW:    A contusion is a bruise that appears on your skin after an injury. A bruise happens when small blood vessels tear but skin does not. Blood leaks into nearby tissue, such as soft tissue or muscle.    DISCHARGE INSTRUCTIONS:    Return to the emergency department if:   •You have new trouble moving the injured area.      •You have tingling or numbness in or near the injured area.      •Your hand or foot below the bruise gets cold or turns pale.       Call your doctor if:   •You find a new lump in the injured area.      •Your symptoms do not improve with treatment after 4 to 5 days.      •You have questions or concerns about your condition or care.      Medicines: You may need any of the following:   •NSAIDs help decrease swelling and pain or fever. This medicine is available with or without a doctor's order. NSAIDs can cause stomach bleeding or kidney problems in certain people. If you take blood thinner medicine, always ask your healthcare provider if NSAIDs are safe for you. Always read the medicine label and follow directions.      •Prescription pain medicine may be given. Ask your healthcare provider how to take this medicine safely. Some prescription pain medicines contain acetaminophen. Do not take other medicines that contain acetaminophen without talking to your healthcare provider. Too much acetaminophen may cause liver damage. Prescription pain medicine may cause constipation. Ask your healthcare provider how to prevent or treat constipation.       •Take your medicine as directed. Contact your healthcare provider if you think your medicine is not helping or if you have side effects. Tell him of her if you are allergic to any medicine. Keep a list of the medicines, vitamins, and herbs you take. Include the amounts, and when and why you take them. Bring the list or the pill bottles to follow-up visits. Carry your medicine list with you in case of an emergency.      Help a contusion heal:   •Rest the injured area or use it less than usual. If you bruised your leg or foot, you may need crutches or a cane to help you walk. This will help you keep weight off your injured body part.       •Apply ice to decrease swelling and pain. Ice may also help prevent tissue damage. Use an ice pack, or put crushed ice in a plastic bag. Cover it with a towel and place it on your bruise for 15 to 20 minutes every hour or as directed.      •Use compression to support the area and decrease swelling. Wrap an elastic bandage around the area over the bruised muscle. Make sure the bandage is not too tight. You should be able to fit 1 finger between the bandage and your skin.      •Elevate (raise) your injured body part above the level of your heart to help decrease pain and swelling. Use pillows, blankets, or rolled towels to elevate the area as often as you can.      •Do not drink alcohol as directed. Alcohol may slow healing.      •Do not stretch injured muscles right after your injury. Ask your healthcare provider when and how you may safely stretch after your injury. Gentle stretches can help increase your flexibility.      •Do not massage the area or put heating pads on the bruise right after your injury. Heat and massage may slow healing. Your healthcare provider may tell you to apply heat after several days. At that time, heat will start to help the injury heal.      Prevent another contusion:   •Stretch and warm up before you play sports or exercise.      •Wear protective gear when you play sports. Examples are shin guards and padding.       •If you begin a new physical activity, start slowly to give your body a chance to adjust.      Follow up with your doctor as directed: Write down your questions so you remember to ask them during your visits.

## 2022-06-16 NOTE — PATIENT PROFILE ADULT - NSPROREFERSVCHOMECARDIO_GEN_A_NUR
"Leo Coelho Medellin  37 y.o. male    Chief Complaint   Patient presents with   • Chest Pain     Pt arrives with complaints of R sided chest pain that began yesterday.    Pt states that pain radiates to R arm and shoulder- states it is \"achy.\" Endorses a \"little bit\" of SOB and nausea.     Vitals:    06/16/22 0554   BP: 121/66   Pulse: 68   Resp: 18   Temp: 36.6 °C (97.8 °F)   SpO2: 95%       Triage process explained to patient, apologized for wait time, and returned to lobby.  Pt informed to notify staff of any change in condition.     " no

## 2023-02-08 NOTE — ED ADULT NURSE NOTE - GENITOURINARY ASSESSMENT
Render In Strict Bullet Format?: No
Initiate Treatment: Clobetasol ointment daily to scalp
Detail Level: Zone
WDL

## 2023-03-30 NOTE — H&P ADULT - PROBLEM/PLAN-3
DISPLAY PLAN FREE TEXT airway patent/good air movement airway patent/good air movement/respirations non-labored

## 2023-09-15 NOTE — ASU PREOP CHECKLIST - BP NONINVASIVE SYSTOLIC (MM HG)
· 91

## 2025-01-03 NOTE — CARDIOLOGY SUMMARY
Dear Ms. Smith,    You went to the hospital because of fatigue and dry heaving. A CT scan of the abdomen and chest was done, which showed a small pleural effusion with your drain in place. Your blood counts were low, so you had to get some blood. The gastroenterologists saw you for this, and they were not concerned. You were also tested for a bacteria called C. difficile, which returned positive. You started on vancomycin, which you will complete at your facility.     The facility will help manage your PleurX. They will also help you with your medications.     Please be on the look out for the following symptoms: fever, chills, nausea, vomiting, bleeding in the urine, bleeding from the stool, continued diarrhea, confusion, and difficulty breathing. These may be signs that you need to be seen in the hospital for further care.     Please make sure that you wash your hands prior to eating. Please make sure others that come in contact with you also wash their hands to help prevent the spread of C. difficile.     You will need blood work in 1 week to make sure your counts and electrolytes are stable.     Please note the following doctors to see after you leave the hospital:   [ ] PCP, central scheduling will set up an appointment for you and let you know when it is  [ ] Pulmonology, central scheduling will set up an appointment for you and let you know when it is  [ ] Oncology - Scheduled on 1/6/25 with Dr. Carlita Danielson     Your  Care Team   [Today's Date] : [unfilled] [FreeTextEntry1] : sinus rhythm with prolonged pr interval, ventricular rate 80 bpm. RBBB [FreeTextEntry2] : Summary:  1. Hypoplastic left heart syndrome.  2. Surgically-created common atrium.  3. Status post placement of right bidirectional Justice shunt.  4. Doppler interrogation of the Justice shunt demonstrated low velocity flow.  5. Status post extracardiac fenestrated Fontan conduit.  6. Status post device closure of Fontan fenestration.  7. Patent Fontan conduit.  8. The branch pulmonary arteries were not adequately demonstrated. Color flow was seen in the proximal RPA.  9. Moderate tricuspid valve regurgitation. 10. Mild neoaortic regurgitation. 11. The right ventricle was dilated and hypertrophied. 12. Moderate global hypokinesia of the right ventricle. 13. No pericardial effusion. Statement Selected

## 2025-03-17 NOTE — PROGRESS NOTE ADULT - SUBJECTIVE AND OBJECTIVE BOX
PROGRESS NOTE:   Christi Ferrera, MS4      Patient is a 29y old  Male who presents with a chief complaint of LE swelling (03 Feb 2020 22:06)      SUBJECTIVE / OVERNIGHT EVENTS: The patient was seen and examined at bedside. He expresses frustration at his situation and wants to just get something done so he can feel better.    REVIEW OF SYSTEMS:    CONSTITUTIONAL: +weakness, no fevers or chills  EYES/ENT: No visual changes;  No vertigo or throat pain   NECK: No pain or stiffness  RESPIRATORY: No cough, wheezing, hemoptysis; No shortness of breath  CARDIOVASCULAR: +b/l LE edema. No chest pain or palpitations  GASTROINTESTINAL: No abdominal or epigastric pain. No nausea, vomiting, or hematemesis; No diarrhea or constipation. No melena or hematochezia.  GENITOURINARY: No dysuria, frequency or hematuria  NEUROLOGICAL: No numbness or weakness  SKIN: No itching, rashes    MEDICATIONS  (STANDING):  digoxin     Tablet 0.125 milliGRAM(s) Oral daily  furosemide   Injectable 40 milliGRAM(s) IV Push two times a day  influenza   Vaccine 0.5 milliLiter(s) IntraMuscular once  lisinopril 2.5 milliGRAM(s) Oral daily  pantoprazole  Injectable 40 milliGRAM(s) IV Push every 12 hours  spironolactone 25 milliGRAM(s) Oral daily    MEDICATIONS  (PRN):  acetaminophen   Tablet .. 650 milliGRAM(s) Oral every 6 hours PRN Temp greater or equal to 38C (100.4F), Mild Pain (1 - 3), Moderate Pain (4 - 6), Severe Pain (7 - 10)        I&O's Summary    03 Feb 2020 07:01  -  04 Feb 2020 07:00  --------------------------------------------------------  IN: 0 mL / OUT: 1100 mL / NET: -1100 mL    04 Feb 2020 07:01  -  04 Feb 2020 08:57  --------------------------------------------------------  IN: 0 mL / OUT: 400 mL / NET: -400 mL        PHYSICAL EXAM:  Vital Signs Last 24 Hrs  T(C): 36.4 (04 Feb 2020 05:41), Max: 36.4 (03 Feb 2020 23:50)  T(F): 97.5 (04 Feb 2020 05:41), Max: 97.6 (03 Feb 2020 23:50)  HR: 82 (04 Feb 2020 05:41) (80 - 87)  BP: 106/57 (04 Feb 2020 05:41) (96/54 - 118/51)  BP(mean): --  RR: 18 (04 Feb 2020 05:41) (16 - 20)  SpO2: 99% (04 Feb 2020 05:41) (93% - 99%)      CONSTITUTIONAL: NAD, well-developed  RESPIRATORY: Normal respiratory effort; lungs are clear to auscultation bilaterally  CARDIOVASCULAR: Regular rate and rhythm, normal S1 and S2, no murmur/rub/gallop; +b/l pitting LE edema (L>R). Peripheral pulses are 2+ bilaterally  ABDOMEN: Soft, distended, nontender to palpation, normoactive bowel sounds, no rebound/guarding  MUSCLOSKELETAL: no clubbing or cyanosis of digits; no joint swelling or tenderness to palpation  PSYCH: A+O to person, place, and time; affect appropriate    LABS:                        8.6    6.62  )-----------( 184      ( 04 Feb 2020 04:00 )             27.7     02-04    132<L>  |  100  |  22  ----------------------------<  89  3.7   |  23  |  0.93    Ca    7.7<L>      04 Feb 2020 04:00  Phos  4.4     02-04  Mg     1.9     02-04    TPro  4.1<L>  /  Alb  2.2<L>  /  TBili  0.4  /  DBili  x   /  AST  16  /  ALT  14  /  AlkPhos  103  02-04    PT/INR - ( 04 Feb 2020 04:00 )   PT: 11.7 SEC;   INR: 1.03          PTT - ( 04 Feb 2020 04:00 )  PTT:26.5 SEC    CXR wnl right upper arm

## 2025-05-06 NOTE — PROGRESS NOTE ADULT - PROBLEM SELECTOR PLAN 2
Echo:    * Mildly reduced left ventricular systolic function with ejection fraction  of 48 %.    * Moderately increased left ventricular wall thickness.    * No left ventricular diastolic dysfunction.    * Mild tricuspid valve regurgitation.    * Right ventricular systolic pressure; 36 mmHg.    * Catheter/pacemaker wire visualized in the right ventricle.    * No pericardial effusion.    * Compared to prior study 4/7/2024 EF has decreased from 65%.     Stress test  Myocardial perfusion imaging showed no area of ischemia or infarction.    Normal wall motion imaging  Ejection fraction of 59 %   Normal perfusion scan        The patient was called. There was no answer. A message was left for them to call back.     Will send to Dr Bazzi for approval of surgery as well   likely 2/2 anemia  vs less likely cardiac  -Blood transfusion as above  -Will call Dr. Hernandez in the AM (cardiologist)